# Patient Record
Sex: FEMALE | Race: WHITE | NOT HISPANIC OR LATINO
[De-identification: names, ages, dates, MRNs, and addresses within clinical notes are randomized per-mention and may not be internally consistent; named-entity substitution may affect disease eponyms.]

---

## 2017-02-02 ENCOUNTER — RX RENEWAL (OUTPATIENT)
Age: 82
End: 2017-02-02

## 2017-02-02 ENCOUNTER — APPOINTMENT (OUTPATIENT)
Dept: DERMATOLOGY | Facility: CLINIC | Age: 82
End: 2017-02-02

## 2017-02-13 ENCOUNTER — RX RENEWAL (OUTPATIENT)
Age: 82
End: 2017-02-13

## 2017-02-16 ENCOUNTER — RX RENEWAL (OUTPATIENT)
Age: 82
End: 2017-02-16

## 2017-03-06 DIAGNOSIS — Z86.39 PERSONAL HISTORY OF OTHER ENDOCRINE, NUTRITIONAL AND METABOLIC DISEASE: ICD-10-CM

## 2017-03-15 ENCOUNTER — RX RENEWAL (OUTPATIENT)
Age: 82
End: 2017-03-15

## 2017-03-28 ENCOUNTER — APPOINTMENT (OUTPATIENT)
Dept: INTERNAL MEDICINE | Facility: CLINIC | Age: 82
End: 2017-03-28

## 2017-04-06 ENCOUNTER — RX RENEWAL (OUTPATIENT)
Age: 82
End: 2017-04-06

## 2017-04-11 ENCOUNTER — NON-APPOINTMENT (OUTPATIENT)
Age: 82
End: 2017-04-11

## 2017-04-11 ENCOUNTER — APPOINTMENT (OUTPATIENT)
Dept: INTERNAL MEDICINE | Facility: CLINIC | Age: 82
End: 2017-04-11

## 2017-04-11 VITALS
HEIGHT: 65 IN | HEART RATE: 76 BPM | SYSTOLIC BLOOD PRESSURE: 130 MMHG | RESPIRATION RATE: 14 BRPM | DIASTOLIC BLOOD PRESSURE: 84 MMHG

## 2017-04-11 RX ORDER — DOCUSATE SODIUM 100 MG/1
100 CAPSULE ORAL
Refills: 0 | Status: ACTIVE | COMMUNITY

## 2017-04-11 RX ORDER — ASPIRIN 81 MG/1
81 TABLET, COATED ORAL
Refills: 0 | Status: ACTIVE | COMMUNITY

## 2017-04-11 RX ORDER — MULTIVITAMIN
CAPSULE ORAL
Refills: 0 | Status: ACTIVE | COMMUNITY

## 2017-04-11 RX ORDER — SENNOSIDES 8.6 MG TABLETS 8.6 MG/1
8.6 TABLET ORAL
Refills: 0 | Status: ACTIVE | COMMUNITY

## 2017-04-11 RX ORDER — LORATADINE 10 MG
17 TABLET,DISINTEGRATING ORAL
Refills: 0 | Status: ACTIVE | COMMUNITY

## 2017-04-11 RX ORDER — TRAVOPROST 0.04 MG/ML
0 SOLUTION/ DROPS OPHTHALMIC
Refills: 0 | Status: ACTIVE | COMMUNITY

## 2017-06-15 ENCOUNTER — RX RENEWAL (OUTPATIENT)
Age: 82
End: 2017-06-15

## 2017-06-16 ENCOUNTER — RX RENEWAL (OUTPATIENT)
Age: 82
End: 2017-06-16

## 2017-06-17 ENCOUNTER — EMERGENCY (EMERGENCY)
Facility: HOSPITAL | Age: 82
LOS: 1 days | Discharge: HOME | End: 2017-06-17
Admitting: EMERGENCY MEDICINE
Payer: COMMERCIAL

## 2017-06-17 DIAGNOSIS — G45.9 TRANSIENT CEREBRAL ISCHEMIC ATTACK, UNSPECIFIED: ICD-10-CM
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
## 2017-06-17 DIAGNOSIS — Z79.82 LONG TERM (CURRENT) USE OF ASPIRIN: ICD-10-CM

## 2017-06-17 DIAGNOSIS — R41.82 ALTERED MENTAL STATUS, UNSPECIFIED: ICD-10-CM

## 2017-06-17 DIAGNOSIS — S80.11XA CONTUSION OF RIGHT LOWER LEG, INITIAL ENCOUNTER: ICD-10-CM

## 2017-06-17 DIAGNOSIS — S00.83XA CONTUSION OF OTHER PART OF HEAD, INITIAL ENCOUNTER: ICD-10-CM

## 2017-06-17 DIAGNOSIS — I95.9 HYPOTENSION, UNSPECIFIED: ICD-10-CM
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
## 2017-06-17 DIAGNOSIS — Z79.899 OTHER LONG TERM (CURRENT) DRUG THERAPY: ICD-10-CM

## 2017-06-17 DIAGNOSIS — F32.9 MAJOR DEPRESSIVE DISORDER, SINGLE EPISODE, UNSPECIFIED: ICD-10-CM
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
## 2017-06-17 PROCEDURE — 85730 THROMBOPLASTIN TIME PARTIAL: CPT

## 2017-06-17 PROCEDURE — 73590 X-RAY EXAM OF LOWER LEG: CPT | Mod: 26,RT

## 2017-06-17 PROCEDURE — 99284 EMERGENCY DEPT VISIT MOD MDM: CPT | Mod: 25

## 2017-06-17 PROCEDURE — 70450 CT HEAD/BRAIN W/O DYE: CPT

## 2017-06-17 PROCEDURE — 73562 X-RAY EXAM OF KNEE 3: CPT

## 2017-06-17 PROCEDURE — 71010: CPT | Mod: 26

## 2017-06-17 PROCEDURE — 73560 X-RAY EXAM OF KNEE 1 OR 2: CPT | Mod: 26,RT

## 2017-06-17 PROCEDURE — 82962 GLUCOSE BLOOD TEST: CPT

## 2017-06-17 PROCEDURE — 71045 X-RAY EXAM CHEST 1 VIEW: CPT

## 2017-06-17 PROCEDURE — 73562 X-RAY EXAM OF KNEE 3: CPT | Mod: 26,RT

## 2017-06-17 PROCEDURE — 85610 PROTHROMBIN TIME: CPT

## 2017-06-17 PROCEDURE — 93010 ELECTROCARDIOGRAM REPORT: CPT

## 2017-06-17 PROCEDURE — 73590 X-RAY EXAM OF LOWER LEG: CPT

## 2017-06-17 PROCEDURE — 99285 EMERGENCY DEPT VISIT HI MDM: CPT

## 2017-06-17 PROCEDURE — 80053 COMPREHEN METABOLIC PANEL: CPT

## 2017-06-17 PROCEDURE — 70450 CT HEAD/BRAIN W/O DYE: CPT | Mod: 26

## 2017-06-17 PROCEDURE — 93005 ELECTROCARDIOGRAM TRACING: CPT

## 2017-06-17 PROCEDURE — 73560 X-RAY EXAM OF KNEE 1 OR 2: CPT

## 2017-06-17 PROCEDURE — 84484 ASSAY OF TROPONIN QUANT: CPT

## 2017-06-17 PROCEDURE — 82550 ASSAY OF CK (CPK): CPT

## 2017-06-17 PROCEDURE — 85027 COMPLETE CBC AUTOMATED: CPT

## 2017-08-07 ENCOUNTER — RX RENEWAL (OUTPATIENT)
Age: 82
End: 2017-08-07

## 2017-08-09 ENCOUNTER — RX RENEWAL (OUTPATIENT)
Age: 82
End: 2017-08-09

## 2017-09-29 ENCOUNTER — MEDICATION RENEWAL (OUTPATIENT)
Age: 82
End: 2017-09-29

## 2017-10-05 ENCOUNTER — RX RENEWAL (OUTPATIENT)
Age: 82
End: 2017-10-05

## 2017-10-06 ENCOUNTER — RX RENEWAL (OUTPATIENT)
Age: 82
End: 2017-10-06

## 2017-10-06 ENCOUNTER — MEDICATION RENEWAL (OUTPATIENT)
Age: 82
End: 2017-10-06

## 2017-12-01 ENCOUNTER — RX RENEWAL (OUTPATIENT)
Age: 82
End: 2017-12-01

## 2017-12-18 ENCOUNTER — MEDICATION RENEWAL (OUTPATIENT)
Age: 82
End: 2017-12-18

## 2017-12-25 ENCOUNTER — EMERGENCY (EMERGENCY)
Facility: HOSPITAL | Age: 82
LOS: 1 days | Discharge: HOME | End: 2017-12-25
Admitting: EMERGENCY MEDICINE
Payer: COMMERCIAL

## 2017-12-25 DIAGNOSIS — Z79.82 LONG TERM (CURRENT) USE OF ASPIRIN: ICD-10-CM

## 2017-12-25 DIAGNOSIS — R04.0 EPISTAXIS: ICD-10-CM

## 2017-12-25 DIAGNOSIS — Z79.52 LONG TERM (CURRENT) USE OF SYSTEMIC STEROIDS: ICD-10-CM

## 2017-12-25 DIAGNOSIS — Z79.899 OTHER LONG TERM (CURRENT) DRUG THERAPY: ICD-10-CM

## 2017-12-25 DIAGNOSIS — F32.9 MAJOR DEPRESSIVE DISORDER, SINGLE EPISODE, UNSPECIFIED: ICD-10-CM

## 2017-12-25 PROCEDURE — 99283 EMERGENCY DEPT VISIT LOW MDM: CPT | Mod: 25

## 2017-12-25 PROCEDURE — 99282 EMERGENCY DEPT VISIT SF MDM: CPT

## 2018-01-01 ENCOUNTER — RX RENEWAL (OUTPATIENT)
Age: 83
End: 2018-01-01

## 2018-02-08 ENCOUNTER — MEDICATION RENEWAL (OUTPATIENT)
Age: 83
End: 2018-02-08

## 2018-02-21 ENCOUNTER — APPOINTMENT (OUTPATIENT)
Dept: INTERNAL MEDICINE | Facility: CLINIC | Age: 83
End: 2018-02-21
Payer: MEDICARE

## 2018-02-21 VITALS
BODY MASS INDEX: 18.33 KG/M2 | WEIGHT: 110 LBS | SYSTOLIC BLOOD PRESSURE: 126 MMHG | HEART RATE: 68 BPM | HEIGHT: 65 IN | RESPIRATION RATE: 15 BRPM | DIASTOLIC BLOOD PRESSURE: 80 MMHG

## 2018-02-21 DIAGNOSIS — Z63.4 DISAPPEARANCE AND DEATH OF FAMILY MEMBER: ICD-10-CM

## 2018-02-21 DIAGNOSIS — Z00.00 ENCOUNTER FOR GENERAL ADULT MEDICAL EXAMINATION W/OUT ABNORMAL FINDINGS: ICD-10-CM

## 2018-02-21 PROCEDURE — 99215 OFFICE O/P EST HI 40 MIN: CPT

## 2018-02-21 SDOH — SOCIAL STABILITY - SOCIAL INSECURITY: DISSAPEARANCE AND DEATH OF FAMILY MEMBER: Z63.4

## 2018-04-02 ENCOUNTER — RX RENEWAL (OUTPATIENT)
Age: 83
End: 2018-04-02

## 2018-04-29 ENCOUNTER — RX RENEWAL (OUTPATIENT)
Age: 83
End: 2018-04-29

## 2018-06-20 ENCOUNTER — RX RENEWAL (OUTPATIENT)
Age: 83
End: 2018-06-20

## 2018-07-05 ENCOUNTER — RX RENEWAL (OUTPATIENT)
Age: 83
End: 2018-07-05

## 2018-08-21 ENCOUNTER — RX RENEWAL (OUTPATIENT)
Age: 83
End: 2018-08-21

## 2018-09-13 ENCOUNTER — RX RENEWAL (OUTPATIENT)
Age: 83
End: 2018-09-13

## 2018-09-20 ENCOUNTER — LABORATORY RESULT (OUTPATIENT)
Age: 83
End: 2018-09-20

## 2018-09-28 ENCOUNTER — RX RENEWAL (OUTPATIENT)
Age: 83
End: 2018-09-28

## 2018-10-07 ENCOUNTER — RX RENEWAL (OUTPATIENT)
Age: 83
End: 2018-10-07

## 2018-10-17 ENCOUNTER — RX RENEWAL (OUTPATIENT)
Age: 83
End: 2018-10-17

## 2018-11-23 ENCOUNTER — INPATIENT (INPATIENT)
Facility: HOSPITAL | Age: 83
LOS: 4 days | Discharge: HOME HEALTH | DRG: 194 | End: 2018-11-28
Attending: INTERNAL MEDICINE | Admitting: INTERNAL MEDICINE
Payer: COMMERCIAL

## 2018-11-23 VITALS
HEIGHT: 65 IN | RESPIRATION RATE: 18 BRPM | DIASTOLIC BLOOD PRESSURE: 61 MMHG | HEART RATE: 80 BPM | SYSTOLIC BLOOD PRESSURE: 142 MMHG | TEMPERATURE: 98 F | WEIGHT: 119.93 LBS

## 2018-11-23 DIAGNOSIS — I95.0 IDIOPATHIC HYPOTENSION: ICD-10-CM

## 2018-11-23 DIAGNOSIS — Z29.9 ENCOUNTER FOR PROPHYLACTIC MEASURES, UNSPECIFIED: ICD-10-CM

## 2018-11-23 DIAGNOSIS — J18.1 LOBAR PNEUMONIA, UNSPECIFIED ORGANISM: ICD-10-CM

## 2018-11-23 DIAGNOSIS — F33.1 MAJOR DEPRESSIVE DISORDER, RECURRENT, MODERATE: ICD-10-CM

## 2018-11-23 LAB
ALBUMIN SERPL ELPH-MCNC: 3.2 G/DL — LOW (ref 3.3–5)
ALP SERPL-CCNC: 63 U/L — SIGNIFICANT CHANGE UP (ref 40–120)
ALT FLD-CCNC: 24 U/L DA — SIGNIFICANT CHANGE UP (ref 10–45)
ANION GAP SERPL CALC-SCNC: 9 MMOL/L — SIGNIFICANT CHANGE UP (ref 5–17)
APTT BLD: 29.2 SEC — SIGNIFICANT CHANGE UP (ref 27.5–36.3)
AST SERPL-CCNC: 53 U/L — HIGH (ref 10–40)
BASOPHILS # BLD AUTO: 0 K/UL — SIGNIFICANT CHANGE UP (ref 0–0.2)
BASOPHILS NFR BLD AUTO: 0.4 % — SIGNIFICANT CHANGE UP (ref 0–2)
BILIRUB SERPL-MCNC: 0.7 MG/DL — SIGNIFICANT CHANGE UP (ref 0.2–1.2)
BUN SERPL-MCNC: 18 MG/DL — SIGNIFICANT CHANGE UP (ref 7–23)
CALCIUM SERPL-MCNC: 10.3 MG/DL — SIGNIFICANT CHANGE UP (ref 8.4–10.5)
CHLORIDE SERPL-SCNC: 102 MMOL/L — SIGNIFICANT CHANGE UP (ref 96–108)
CO2 SERPL-SCNC: 30 MMOL/L — SIGNIFICANT CHANGE UP (ref 22–31)
CREAT SERPL-MCNC: 0.97 MG/DL — SIGNIFICANT CHANGE UP (ref 0.5–1.3)
EOSINOPHIL # BLD AUTO: 0 K/UL — SIGNIFICANT CHANGE UP (ref 0–0.5)
EOSINOPHIL NFR BLD AUTO: 0.1 % — SIGNIFICANT CHANGE UP (ref 0–6)
FLUAV SPEC QL CULT: NEGATIVE — SIGNIFICANT CHANGE UP
FLUBV AG SPEC QL IA: NEGATIVE — SIGNIFICANT CHANGE UP
GLUCOSE SERPL-MCNC: 110 MG/DL — HIGH (ref 70–99)
HCT VFR BLD CALC: 36.8 % — SIGNIFICANT CHANGE UP (ref 34.5–45)
HGB BLD-MCNC: 12 G/DL — SIGNIFICANT CHANGE UP (ref 11.5–15.5)
INR BLD: 1.2 RATIO — HIGH (ref 0.88–1.16)
LACTATE SERPL-SCNC: 1.3 MMOL/L — SIGNIFICANT CHANGE UP (ref 0.7–2)
LYMPHOCYTES # BLD AUTO: 0.7 K/UL — LOW (ref 1–3.3)
LYMPHOCYTES # BLD AUTO: 5.9 % — LOW (ref 13–44)
MCHC RBC-ENTMCNC: 31.8 PG — SIGNIFICANT CHANGE UP (ref 27–34)
MCHC RBC-ENTMCNC: 32.7 GM/DL — SIGNIFICANT CHANGE UP (ref 32–36)
MCV RBC AUTO: 97.3 FL — SIGNIFICANT CHANGE UP (ref 80–100)
MONOCYTES # BLD AUTO: 1.6 K/UL — HIGH (ref 0–0.9)
MONOCYTES NFR BLD AUTO: 14.8 % — HIGH (ref 2–14)
NEUTROPHILS # BLD AUTO: 8.7 K/UL — HIGH (ref 1.8–7.4)
NEUTROPHILS NFR BLD AUTO: 78.8 % — HIGH (ref 43–77)
NT-PROBNP SERPL-SCNC: 2057 PG/ML — HIGH (ref 0–300)
PLATELET # BLD AUTO: 195 K/UL — SIGNIFICANT CHANGE UP (ref 150–400)
POTASSIUM SERPL-MCNC: 4.8 MMOL/L — SIGNIFICANT CHANGE UP (ref 3.5–5.3)
POTASSIUM SERPL-SCNC: 4.8 MMOL/L — SIGNIFICANT CHANGE UP (ref 3.5–5.3)
PROT SERPL-MCNC: 8.2 G/DL — SIGNIFICANT CHANGE UP (ref 6–8.3)
PROTHROM AB SERPL-ACNC: 13.5 SEC — HIGH (ref 10–12.9)
RBC # BLD: 3.78 M/UL — LOW (ref 3.8–5.2)
RBC # FLD: 13.6 % — SIGNIFICANT CHANGE UP (ref 10.3–14.5)
SODIUM SERPL-SCNC: 141 MMOL/L — SIGNIFICANT CHANGE UP (ref 135–145)
WBC # BLD: 11.1 K/UL — HIGH (ref 3.8–10.5)
WBC # FLD AUTO: 11.1 K/UL — HIGH (ref 3.8–10.5)

## 2018-11-23 PROCEDURE — 99223 1ST HOSP IP/OBS HIGH 75: CPT

## 2018-11-23 PROCEDURE — 93010 ELECTROCARDIOGRAM REPORT: CPT

## 2018-11-23 PROCEDURE — 71045 X-RAY EXAM CHEST 1 VIEW: CPT | Mod: 26

## 2018-11-23 PROCEDURE — 99285 EMERGENCY DEPT VISIT HI MDM: CPT

## 2018-11-23 PROCEDURE — 71250 CT THORAX DX C-: CPT | Mod: 26

## 2018-11-23 RX ORDER — AZITHROMYCIN 500 MG/1
500 TABLET, FILM COATED ORAL DAILY
Qty: 0 | Refills: 0 | Status: DISCONTINUED | OUTPATIENT
Start: 2018-11-23 | End: 2018-11-24

## 2018-11-23 RX ORDER — SENNA PLUS 8.6 MG/1
2 TABLET ORAL AT BEDTIME
Qty: 0 | Refills: 0 | Status: DISCONTINUED | OUTPATIENT
Start: 2018-11-23 | End: 2018-11-28

## 2018-11-23 RX ORDER — ACETAMINOPHEN 500 MG
650 TABLET ORAL ONCE
Qty: 0 | Refills: 0 | Status: COMPLETED | OUTPATIENT
Start: 2018-11-23 | End: 2018-11-23

## 2018-11-23 RX ORDER — POLYETHYLENE GLYCOL 3350 17 G/17G
17 POWDER, FOR SOLUTION ORAL DAILY
Qty: 0 | Refills: 0 | Status: DISCONTINUED | OUTPATIENT
Start: 2018-11-23 | End: 2018-11-28

## 2018-11-23 RX ORDER — SODIUM CHLORIDE 9 MG/ML
1000 INJECTION INTRAMUSCULAR; INTRAVENOUS; SUBCUTANEOUS
Qty: 0 | Refills: 0 | Status: DISCONTINUED | OUTPATIENT
Start: 2018-11-23 | End: 2018-11-24

## 2018-11-23 RX ORDER — CEFTRIAXONE 500 MG/1
1 INJECTION, POWDER, FOR SOLUTION INTRAMUSCULAR; INTRAVENOUS ONCE
Qty: 0 | Refills: 0 | Status: COMPLETED | OUTPATIENT
Start: 2018-11-23 | End: 2018-11-23

## 2018-11-23 RX ORDER — MIDODRINE HYDROCHLORIDE 2.5 MG/1
10 TABLET ORAL THREE TIMES A DAY
Qty: 0 | Refills: 0 | Status: DISCONTINUED | OUTPATIENT
Start: 2018-11-23 | End: 2018-11-28

## 2018-11-23 RX ORDER — DOCUSATE SODIUM 100 MG
100 CAPSULE ORAL
Qty: 0 | Refills: 0 | Status: DISCONTINUED | OUTPATIENT
Start: 2018-11-23 | End: 2018-11-28

## 2018-11-23 RX ORDER — CEFTRIAXONE 500 MG/1
1 INJECTION, POWDER, FOR SOLUTION INTRAMUSCULAR; INTRAVENOUS EVERY 24 HOURS
Qty: 0 | Refills: 0 | Status: DISCONTINUED | OUTPATIENT
Start: 2018-11-23 | End: 2018-11-28

## 2018-11-23 RX ORDER — ASPIRIN/CALCIUM CARB/MAGNESIUM 324 MG
81 TABLET ORAL DAILY
Qty: 0 | Refills: 0 | Status: DISCONTINUED | OUTPATIENT
Start: 2018-11-23 | End: 2018-11-28

## 2018-11-23 RX ORDER — LATANOPROST 0.05 MG/ML
1 SOLUTION/ DROPS OPHTHALMIC; TOPICAL AT BEDTIME
Qty: 0 | Refills: 0 | Status: DISCONTINUED | OUTPATIENT
Start: 2018-11-23 | End: 2018-11-28

## 2018-11-23 RX ORDER — HEPARIN SODIUM 5000 [USP'U]/ML
5000 INJECTION INTRAVENOUS; SUBCUTANEOUS EVERY 8 HOURS
Qty: 0 | Refills: 0 | Status: DISCONTINUED | OUTPATIENT
Start: 2018-11-23 | End: 2018-11-28

## 2018-11-23 RX ORDER — AZITHROMYCIN 500 MG/1
500 TABLET, FILM COATED ORAL ONCE
Qty: 0 | Refills: 0 | Status: COMPLETED | OUTPATIENT
Start: 2018-11-23 | End: 2018-11-23

## 2018-11-23 RX ORDER — MIRTAZAPINE 45 MG/1
15 TABLET, ORALLY DISINTEGRATING ORAL DAILY
Qty: 0 | Refills: 0 | Status: DISCONTINUED | OUTPATIENT
Start: 2018-11-23 | End: 2018-11-28

## 2018-11-23 RX ORDER — SODIUM CHLORIDE 9 MG/ML
1700 INJECTION INTRAMUSCULAR; INTRAVENOUS; SUBCUTANEOUS ONCE
Qty: 0 | Refills: 0 | Status: COMPLETED | OUTPATIENT
Start: 2018-11-23 | End: 2018-11-23

## 2018-11-23 RX ADMIN — AZITHROMYCIN 255 MILLIGRAM(S): 500 TABLET, FILM COATED ORAL at 15:20

## 2018-11-23 RX ADMIN — CEFTRIAXONE 1 GRAM(S): 500 INJECTION, POWDER, FOR SOLUTION INTRAMUSCULAR; INTRAVENOUS at 15:20

## 2018-11-23 RX ADMIN — CEFTRIAXONE 100 GRAM(S): 500 INJECTION, POWDER, FOR SOLUTION INTRAMUSCULAR; INTRAVENOUS at 15:05

## 2018-11-23 RX ADMIN — SODIUM CHLORIDE 1700 MILLILITER(S): 9 INJECTION INTRAMUSCULAR; INTRAVENOUS; SUBCUTANEOUS at 17:00

## 2018-11-23 RX ADMIN — SODIUM CHLORIDE 1700 MILLILITER(S): 9 INJECTION INTRAMUSCULAR; INTRAVENOUS; SUBCUTANEOUS at 15:55

## 2018-11-23 RX ADMIN — Medication 650 MILLIGRAM(S): at 15:55

## 2018-11-23 RX ADMIN — Medication 650 MILLIGRAM(S): at 16:37

## 2018-11-23 NOTE — H&P ADULT - NSHPREVIEWOFSYSTEMS_GEN_ALL_CORE
REVIEW OF SYSTEMS:  CONSTITUTIONAL: No fever, weight loss, +fatigue  RESPIRATORY: + cough, no wheezing, chills or hemoptysis; No shortness of breath  CARDIOVASCULAR: No chest pain, palpitations, dizziness, or leg swelling  GASTROINTESTINAL: No abdominal pain, No nausea, vomiting, or hematemesis; No diarrhea or constipation  GENITOURINARY: No dysuria, frequency, hematuria, or incontinence  NEUROLOGICAL: No headaches, memory loss, loss of strength, numbness, or tremors  SKIN: No itching, burning, rashes, or lesions   MUSCULOSKELETAL: No joint pain or swelling; No muscle, back, or extremity pain      All other ROS reviewed and negative except as otherwise stated

## 2018-11-23 NOTE — H&P ADULT - NSHPPHYSICALEXAM_GEN_ALL_CORE
PHYSICAL EXAM:  GENERAL: elderly, lethargic  CHEST/LUNG: right sided rhonchi   HEART: Regular rate and rhythm; S1/S2, No murmurs, rubs, or gallops  ABDOMEN: Soft, Nontender, Nondistended; Bowel sounds present  VASCULAR: Normal pulses, Normal capillary refill  EXTREMITIES:  2+ Peripheral Pulses, No clubbing, cyanosis, or edema  SKIN: Warm, Intact  PSYCH: Normal mood and affect  NERVOUS SYSTEM:  A/O x3, Good concentration; CN 2-12 intact, No focal deficits

## 2018-11-23 NOTE — H&P ADULT - HISTORY OF PRESENT ILLNESS
88 y/o F with PMH depression, hypotension presents with a cough for 2 days, today more productive. Patients daughter didnt like how she looked, lethargic , and brought her to ED.Pt admit to having chills and no appetite. Denies chest pain, sob, n/v/d, abd pain, sick contacts

## 2018-11-23 NOTE — ED PROVIDER NOTE - PROGRESS NOTE DETAILS
fluids held untile cxr and labs resulted. b/l rales. concern for CHF. will reassess and give fluids if needed

## 2018-11-23 NOTE — ED ADULT NURSE NOTE - OBJECTIVE STATEMENT
88 YO female, brought in by family who state the patient has had a cough for " a few days". + Rhonchi noted with cough.

## 2018-11-23 NOTE — ED PROVIDER NOTE - ATTENDING CONTRIBUTION TO CARE
· HPI Objective Statement: pt 89 f c/o cough x 1 day, lethargy and decreased appetite. pt feels weak. SOB while walking  	+sick contacts a home with cold like symptoms  denies fever, chills, chest pain, n/v  PE General:     NAD, ill -appearing  Head:     NC/AT, EOMI, oral mucosa dry   Neck:     trachea midline  Lungs:     RLL rales   CVS:     S1S2, RRR, no m/g/r  Abd:     +BS, s/nt/nd, no organomegaly  Ext:    2+ radial and pedal pulses, no c/c/e  Neuro: AAOx3, no sensory/motor deficits  Dr. Bhatti:  I have reviewed and discussed with the PA/ resident the case specifics, including the history, physical assessment, evaluation, conclusion, laboratory results, and medical plan. I agree with the contents, and conclusions. I have personally examined, and interviewed the patient.

## 2018-11-23 NOTE — ED ADULT NURSE NOTE - NSIMPLEMENTINTERV_GEN_ALL_ED
Implemented All Fall with Harm Risk Interventions:  [place] to call system. Call bell, personal items and telephone within reach. Instruct patient to call for assistance. Room bathroom lighting operational. Non-slip footwear when patient is off stretcher. Physically safe environment: no spills, clutter or unnecessary equipment. Stretcher in lowest position, wheels locked, appropriate side rails in place. Provide visual cue, wrist band, yellow gown, etc. Monitor gait and stability. Monitor for mental status changes and reorient to person, place, and time. Review medications for side effects contributing to fall risk. Reinforce activity limits and safety measures with patient and family. Provide visual clues: red socks.

## 2018-11-23 NOTE — H&P ADULT - NSHPLABSRESULTS_GEN_ALL_CORE
12.0   11.1  )-----------( 195      ( 23 Nov 2018 15:00 )             36.8       11-23    141  |  102  |  18  ----------------------------<  110<H>  4.8   |  30  |  0.97    Ca    10.3      23 Nov 2018 15:00    TPro  8.2  /  Alb  3.2<L>  /  TBili  0.7  /  DBili  x   /  AST  53<H>  /  ALT  24  /  AlkPhos  63  11-23      PT/INR - ( 23 Nov 2018 15:00 )   PT: 13.5 sec;   INR: 1.20 ratio         PTT - ( 23 Nov 2018 15:00 )  PTT:29.2 sec        < from: CT Chest No Cont (11.23.18 @ 16:49) >    IMPRESSION:     1.  Consolidation within the basilar right lower lobe representing   pneumonia. Recommend follow-up in 4 weeks to determine resolution.    2.  The thyroid gland is enlarged with multiple nodules. Recommend   further evaluation with dedicated ultrasound exam.      < end of copied text >

## 2018-11-23 NOTE — ED PROVIDER NOTE - PHYSICAL EXAMINATION
General:     NAD, ill appearing  Eyes: PERRL  Head:     NC/AT, EOMI, oral mucosa moist  Neck:     trachea midline  Lungs:     RLL rhonchi, b/l rales  CVS:     RRR  Abd:     +BS, s/nt/nd  Ext:   no deformities   Neuro: AAOx3, no sensory/motor deficits

## 2018-11-23 NOTE — ED PROVIDER NOTE - OBJECTIVE STATEMENT
pt 89 f c/o cough x 1 day, lethargy and decreased appetite. pt 89 f c/o cough x 1 day, lethargy and decreased appetite. pt feels weak. SOB while walking  +sick contacts a home with cold like symptoms  denies fever, chills, chest pain, n/v

## 2018-11-23 NOTE — H&P ADULT - PROBLEM SELECTOR PLAN 4
IMPROVE VTE Individual Risk Assessment          RISK                                                          Points  [  ] Previous VTE                                                3  [  ] Thrombophilia                                             2  [  ] Lower limb paralysis                                   2        (unable to hold up >15 seconds)    [  ] Current Cancer                                             2         (within 6 months)  [  ] Immobilization > 24 hrs                              1  [  ] ICU/CCU stay > 24 hours                             1  [ x ] Age > 60                                                         1    IMPROVE VTE Score:   1

## 2018-11-24 DIAGNOSIS — F32.9 MAJOR DEPRESSIVE DISORDER, SINGLE EPISODE, UNSPECIFIED: ICD-10-CM

## 2018-11-24 DIAGNOSIS — J18.9 PNEUMONIA, UNSPECIFIED ORGANISM: ICD-10-CM

## 2018-11-24 LAB
ANION GAP SERPL CALC-SCNC: 8 MMOL/L — SIGNIFICANT CHANGE UP (ref 5–17)
APPEARANCE UR: CLEAR — SIGNIFICANT CHANGE UP
BACTERIA # UR AUTO: ABNORMAL /HPF
BILIRUB UR-MCNC: NEGATIVE — SIGNIFICANT CHANGE UP
BUN SERPL-MCNC: 17 MG/DL — SIGNIFICANT CHANGE UP (ref 7–23)
CALCIUM SERPL-MCNC: 9.3 MG/DL — SIGNIFICANT CHANGE UP (ref 8.4–10.5)
CHLORIDE SERPL-SCNC: 110 MMOL/L — HIGH (ref 96–108)
CO2 SERPL-SCNC: 28 MMOL/L — SIGNIFICANT CHANGE UP (ref 22–31)
COLOR SPEC: YELLOW — SIGNIFICANT CHANGE UP
CREAT SERPL-MCNC: 0.87 MG/DL — SIGNIFICANT CHANGE UP (ref 0.5–1.3)
DIFF PNL FLD: ABNORMAL
EPI CELLS # UR: SIGNIFICANT CHANGE UP
GLUCOSE SERPL-MCNC: 83 MG/DL — SIGNIFICANT CHANGE UP (ref 70–99)
GLUCOSE UR QL: NEGATIVE — SIGNIFICANT CHANGE UP
HCT VFR BLD CALC: 31.8 % — LOW (ref 34.5–45)
HGB BLD-MCNC: 10.2 G/DL — LOW (ref 11.5–15.5)
KETONES UR-MCNC: NEGATIVE — SIGNIFICANT CHANGE UP
LEUKOCYTE ESTERASE UR-ACNC: ABNORMAL
MCHC RBC-ENTMCNC: 31.8 PG — SIGNIFICANT CHANGE UP (ref 27–34)
MCHC RBC-ENTMCNC: 32.1 GM/DL — SIGNIFICANT CHANGE UP (ref 32–36)
MCV RBC AUTO: 98.9 FL — SIGNIFICANT CHANGE UP (ref 80–100)
NITRITE UR-MCNC: NEGATIVE — SIGNIFICANT CHANGE UP
PH UR: 6 — SIGNIFICANT CHANGE UP (ref 5–8)
PLATELET # BLD AUTO: 145 K/UL — LOW (ref 150–400)
POTASSIUM SERPL-MCNC: 3.1 MMOL/L — LOW (ref 3.5–5.3)
POTASSIUM SERPL-SCNC: 3.1 MMOL/L — LOW (ref 3.5–5.3)
PROT UR-MCNC: 15
RBC # BLD: 3.21 M/UL — LOW (ref 3.8–5.2)
RBC # FLD: 13.5 % — SIGNIFICANT CHANGE UP (ref 10.3–14.5)
RBC CASTS # UR COMP ASSIST: ABNORMAL /HPF (ref 0–4)
SODIUM SERPL-SCNC: 146 MMOL/L — HIGH (ref 135–145)
SP GR SPEC: 1.01 — SIGNIFICANT CHANGE UP (ref 1.01–1.02)
UROBILINOGEN FLD QL: NEGATIVE — SIGNIFICANT CHANGE UP
WBC # BLD: 15.6 K/UL — HIGH (ref 3.8–10.5)
WBC # FLD AUTO: 15.6 K/UL — HIGH (ref 3.8–10.5)
WBC UR QL: SIGNIFICANT CHANGE UP /HPF (ref 0–5)

## 2018-11-24 PROCEDURE — 99235 HOSP IP/OBS SAME DATE MOD 70: CPT

## 2018-11-24 PROCEDURE — 99223 1ST HOSP IP/OBS HIGH 75: CPT

## 2018-11-24 RX ORDER — DEXTROSE MONOHYDRATE, SODIUM CHLORIDE, AND POTASSIUM CHLORIDE 50; .745; 4.5 G/1000ML; G/1000ML; G/1000ML
1000 INJECTION, SOLUTION INTRAVENOUS
Qty: 0 | Refills: 0 | Status: DISCONTINUED | OUTPATIENT
Start: 2018-11-24 | End: 2018-11-28

## 2018-11-24 RX ORDER — POTASSIUM CHLORIDE 20 MEQ
10 PACKET (EA) ORAL ONCE
Qty: 0 | Refills: 0 | Status: COMPLETED | OUTPATIENT
Start: 2018-11-24 | End: 2018-11-24

## 2018-11-24 RX ORDER — ALBUTEROL 90 UG/1
2.5 AEROSOL, METERED ORAL EVERY 8 HOURS
Qty: 0 | Refills: 0 | Status: DISCONTINUED | OUTPATIENT
Start: 2018-11-24 | End: 2018-11-26

## 2018-11-24 RX ADMIN — MIRTAZAPINE 15 MILLIGRAM(S): 45 TABLET, ORALLY DISINTEGRATING ORAL at 22:14

## 2018-11-24 RX ADMIN — Medication 81 MILLIGRAM(S): at 12:44

## 2018-11-24 RX ADMIN — Medication 100 MILLIGRAM(S): at 17:52

## 2018-11-24 RX ADMIN — AZITHROMYCIN 500 MILLIGRAM(S): 500 TABLET, FILM COATED ORAL at 12:45

## 2018-11-24 RX ADMIN — LATANOPROST 1 DROP(S): 0.05 SOLUTION/ DROPS OPHTHALMIC; TOPICAL at 00:19

## 2018-11-24 RX ADMIN — SENNA PLUS 2 TABLET(S): 8.6 TABLET ORAL at 22:14

## 2018-11-24 RX ADMIN — SENNA PLUS 2 TABLET(S): 8.6 TABLET ORAL at 00:17

## 2018-11-24 RX ADMIN — CEFTRIAXONE 100 GRAM(S): 500 INJECTION, POWDER, FOR SOLUTION INTRAMUSCULAR; INTRAVENOUS at 06:40

## 2018-11-24 RX ADMIN — POLYETHYLENE GLYCOL 3350 17 GRAM(S): 17 POWDER, FOR SOLUTION ORAL at 12:44

## 2018-11-24 RX ADMIN — Medication 81 MILLIGRAM(S): at 00:14

## 2018-11-24 RX ADMIN — POLYETHYLENE GLYCOL 3350 17 GRAM(S): 17 POWDER, FOR SOLUTION ORAL at 00:16

## 2018-11-24 RX ADMIN — DEXTROSE MONOHYDRATE, SODIUM CHLORIDE, AND POTASSIUM CHLORIDE 50 MILLILITER(S): 50; .745; 4.5 INJECTION, SOLUTION INTRAVENOUS at 12:45

## 2018-11-24 RX ADMIN — HEPARIN SODIUM 5000 UNIT(S): 5000 INJECTION INTRAVENOUS; SUBCUTANEOUS at 00:18

## 2018-11-24 RX ADMIN — HEPARIN SODIUM 5000 UNIT(S): 5000 INJECTION INTRAVENOUS; SUBCUTANEOUS at 06:40

## 2018-11-24 RX ADMIN — Medication 100 MILLIEQUIVALENT(S): at 12:45

## 2018-11-24 RX ADMIN — HEPARIN SODIUM 5000 UNIT(S): 5000 INJECTION INTRAVENOUS; SUBCUTANEOUS at 22:15

## 2018-11-24 RX ADMIN — Medication 100 MILLIGRAM(S): at 00:15

## 2018-11-24 RX ADMIN — LATANOPROST 1 DROP(S): 0.05 SOLUTION/ DROPS OPHTHALMIC; TOPICAL at 22:15

## 2018-11-24 RX ADMIN — MIRTAZAPINE 15 MILLIGRAM(S): 45 TABLET, ORALLY DISINTEGRATING ORAL at 00:15

## 2018-11-24 RX ADMIN — Medication 100 MILLIGRAM(S): at 06:40

## 2018-11-24 RX ADMIN — ALBUTEROL 2.5 MILLIGRAM(S): 90 AEROSOL, METERED ORAL at 23:02

## 2018-11-24 RX ADMIN — HEPARIN SODIUM 5000 UNIT(S): 5000 INJECTION INTRAVENOUS; SUBCUTANEOUS at 14:29

## 2018-11-24 RX ADMIN — MIDODRINE HYDROCHLORIDE 10 MILLIGRAM(S): 2.5 TABLET ORAL at 00:55

## 2018-11-24 NOTE — CONSULT NOTE ADULT - SUBJECTIVE AND OBJECTIVE BOX
HPI:HPI:  89 y/o F with PMH depression, hypotension presents with a cough for 2 days, today more productive. Patients daughter didnt like how she looked, lethargic , and brought her to ED.Pt admit to having chills and no appetite. Denies chest pain, sob, n/v/d, abd pain, sick contacts (23 Nov 2018 17:56)      PAST MEDICAL & SURGICAL HISTORY:  Bladder dysfunction  Glaucoma  Depression  Hypotension  No significant past surgical history      FAMILY HISTORY:  No pertinent family history in first degree relatives      SOCIAL HISTORY:  Smoking: denies      No Known Allergies    Intolerances        HOME  MEDICATIONS:Home Medications:  aspirin 81 mg oral tablet, chewable: 1 tab(s) orally once a day (23 Nov 2018 18:16)  Colace 100 mg oral capsule: 1 cap(s) orally 2 times a day (23 Nov 2018 18:16)  fludrocortisone 0.1 mg oral tablet: orally 2 times a day (23 Nov 2018 18:16)  midodrine 10 mg oral tablet: 1 tab(s) orally 3 times a day (23 Nov 2018 18:16)  MiraLax oral powder for reconstitution: orally once a day (23 Nov 2018 18:16)  mirtazapine 15 mg oral tablet: 1 tab(s) orally once a day (at bedtime) (23 Nov 2018 18:16)  Multiple Vitamins oral capsule: 1 cap(s) orally once a day (23 Nov 2018 18:16)  Senna 8.6 mg oral tablet: 1 tab(s) orally once a day (at bedtime) (23 Nov 2018 18:16)  tolterodine 1 mg oral tablet: orally once a day (23 Nov 2018 18:16)  Travatan Z 0.004% ophthalmic solution: 1 drop(s) to each affected eye once a day (in the evening) (23 Nov 2018 18:16)      REVIEW OF SYSTEMS:  Constitutional: No fevers or chills. No weight loss. fatigue   Eyes: No itching or discharge from the eyes  ENT: No ear pain. No ear discharge. No nasal congestion. No post nasal drip. No epistaxis. No throat pain. No sore throat. No difficulty swallowing.   CV: No chest pain. No palpitations. No lightheadedness or dizziness.   Resp: No dyspnea at rest. No dyspnea on exertion.cough  GI: No nausea. No vomiting. No diarrhea.  MSK: No joint pain or pain in any extremities  Integumentary: No skin lesions. No pedal edema.  Neurological: No gross motor weakness. No sensory changes.    [ ] All other systems negative  [ ] Unable to assess ROS because ________    OBJECTIVE:  ICU Vital Signs Last 24 Hrs  T(C): 36.8 (24 Nov 2018 06:53), Max: 39.3 (23 Nov 2018 15:35)  T(F): 98.3 (24 Nov 2018 06:53), Max: 102.8 (23 Nov 2018 15:35)  HR: 59 (24 Nov 2018 06:53) (59 - 90)  BP: 110/60 (24 Nov 2018 06:53) (89/57 - 142/61)  BP(mean): --  ABP: --  ABP(mean): --  RR: 14 (24 Nov 2018 06:53) (14 - 18)  SpO2: 96% (24 Nov 2018 06:53) (92% - 97%)        11-23 @ 07:01  -  11-24 @ 07:00  --------------------------------------------------------  IN: 1240 mL / OUT: 0 mL / NET: 1240 mL    11-24 @ 07:01  -  11-24 @ 12:06  --------------------------------------------------------  IN: 300 mL / OUT: 0 mL / NET: 300 mL      CAPILLARY BLOOD GLUCOSE          PHYSICAL EXAM:  General: Awake, alert, oriented X 3.   HEENT: Atraumatic, normocephalic.                 No nasal congestion.                No tonsillar or pharyngeal exudates.  Lymph Nodes: No palpable lymphadenopathy  Neck: No JVD. No carotid bruit.   Respiratory: scattered coarse bs  Cardiovascular: S1 S2 normal. No murmurs, rubs or gallops.   Abdomen: Soft, non-tender, non-distended. No organomegaly.  BS +  Extremities: Warm to touch. Peripheral pulse palpable. No pedal edema.   Skin: No rashes or skin lesions  Neurological: Motor and sensory examination equal and normal in all four extremities.  Psychiatry: Appropriate mood and affect.    HOSPITAL MEDICATIONS:  MEDICATIONS  (STANDING):  ALBUTerol   0.5% 2.5 milliGRAM(s) Nebulizer every 8 hours  aspirin  chewable 81 milliGRAM(s) Oral daily  azithromycin   Tablet 500 milliGRAM(s) Oral daily  cefTRIAXone   IVPB 1 Gram(s) IV Intermittent every 24 hours  dextrose 5% + sodium chloride 0.45% with potassium chloride 20 mEq/L 1000 milliLiter(s) (50 mL/Hr) IV Continuous <Continuous>  docusate sodium 100 milliGRAM(s) Oral two times a day  heparin  Injectable 5000 Unit(s) SubCutaneous every 8 hours  latanoprost 0.005% Ophthalmic Solution 1 Drop(s) Both EYES at bedtime  mirtazapine 15 milliGRAM(s) Oral daily  polyethylene glycol 3350 17 Gram(s) Oral daily  potassium chloride  10 mEq/100 mL IVPB 10 milliEquivalent(s) IV Intermittent once  senna 2 Tablet(s) Oral at bedtime    MEDICATIONS  (PRN):  midodrine 10 milliGRAM(s) Oral three times a day PRN hypotension      LABS:                        10.2   15.6  )-----------( 145      ( 24 Nov 2018 06:10 )             31.8     11-24    146<H>  |  110<H>  |  17  ----------------------------<  83  3.1<L>   |  28  |  0.87    Ca    9.3      24 Nov 2018 06:10    TPro  8.2  /  Alb  3.2<L>  /  TBili  0.7  /  DBili  x   /  AST  53<H>  /  ALT  24  /  AlkPhos  63  11-23    PT/INR - ( 23 Nov 2018 15:00 )   PT: 13.5 sec;   INR: 1.20 ratio         PTT - ( 23 Nov 2018 15:00 )  PTT:29.2 sec          MICROBIOLOGY:     RADIOLOGY:  [ ] Reviewed and interpreted by me  < from: CT Chest No Cont (11.23.18 @ 16:49) >    EXAM:  CT CHEST      PROCEDURE DATE:  11/23/2018        INTERPRETATION:  CT CHEST WITHOUT CONTRAST    INDICATION: fever, cough, abnormal breath sounds    TECHNIQUE: Unenhanced helical images were obtained of the chest. Coronal   and sagittal imageswere reconstructed. Maximum intensity projection   images were generated.     COMPARISON: CT chest 11/22/2011.    FINDINGS:     AIRWAYS, LUNGS, PLEURA: The trachea is mildly deviated towards the left   side secondary to enlarged thyroid gland. Patentcentral airways. No   bronchial wall thickening or bronchiectasis.    Consolidation within the basilar right lower lobe. Mild bronchocentric   opacities within the basilar left lower lobe. Indistinct groundglass   opacities within the right middle lobe.    Minimal biapical scarring.    Trace right pleural effusion.    MEDIASTINUM, LYMPH NODES: Subcentimeter mediastinal lymph nodes likely   reactive.    HEART AND VASCULATURE: The heart is mildly enlarged. Trace pericardial   effusion. The thoracicaorta and pulmonary artery are normal in course   and caliber.    UPPER ABDOMEN: Unremarkable.    BONES AND SOFT TISSUES: No aggressive osseous lesion. Osteopenia. Spinal   degenerative changes.    LOWER NECK: The thyroid gland is severely enlarged with multiple nodules.    IMPRESSION:     1.  Consolidation within the basilar right lower lobe representing   pneumonia. Recommend follow-up in 4 weeks to determine resolution.    2.  The thyroid gland is enlarged with multiple nodules. Recommend   further evaluation with dedicated ultrasound exam.    < end of copied text >     EKG:

## 2018-11-24 NOTE — CONSULT NOTE ADULT - SUBJECTIVE AND OBJECTIVE BOX
HPI:   Patient is a 89y female with chronic hypotension, dementia, lives home with daughter, admitted yesterday for cough, fever, lethargy for the past couple of days per notes. Patient does not really know why she is here but does admit to a cough at times and states her daughter had to go to a wedding. She had difficulty swallowing pills with mild sore throat as her only other complaints.     REVIEW OF SYSTEMS:  All other review of systems negative (Comprehensive ROS)    PAST MEDICAL & SURGICAL HISTORY:  Bladder dysfunction  Glaucoma  Depression  Hypotension  No significant past surgical history      Allergies    No Known Allergies    Intolerances        Antimicrobials Day #  :2  cefTRIAXone   IVPB 1 Gram(s) IV Intermittent every 24 hours  doxycycline hyclate Capsule 100 milliGRAM(s) Oral every 12 hours    Other Medications:  ALBUTerol   0.5% 2.5 milliGRAM(s) Nebulizer every 8 hours  aspirin  chewable 81 milliGRAM(s) Oral daily  dextrose 5% + sodium chloride 0.45% with potassium chloride 20 mEq/L 1000 milliLiter(s) IV Continuous <Continuous>  docusate sodium 100 milliGRAM(s) Oral two times a day  heparin  Injectable 5000 Unit(s) SubCutaneous every 8 hours  latanoprost 0.005% Ophthalmic Solution 1 Drop(s) Both EYES at bedtime  midodrine 10 milliGRAM(s) Oral three times a day PRN  mirtazapine 15 milliGRAM(s) Oral daily  polyethylene glycol 3350 17 Gram(s) Oral daily  senna 2 Tablet(s) Oral at bedtime      FAMILY HISTORY:  No pertinent family history in first degree relatives      SOCIAL HISTORY:  Smoking: [ ]Yes [x ]No  ETOH: [ ]Yes [ x]No  Drug Use: [ ]Yes [x ]No   [x ] Single[ ]    T(F): 98.3 (11-24-18 @ 06:53), Max: 102.8 (11-23-18 @ 15:35)  HR: 59 (11-24-18 @ 06:53)  BP: 110/60 (11-24-18 @ 06:53)  RR: 14 (11-24-18 @ 06:53)  SpO2: 96% (11-24-18 @ 06:53)  Wt(kg): --    PHYSICAL EXAM:  General: alert, no acute distress  Eyes:  anicteric, no conjunctival injection, no discharge  Oropharynx: no lesions or injection 	  Neck: supple, without adenopathy  Lungs: occasional wheeze  to auscultation  Heart: regular rate and rhythm; no murmur, rubs or gallops  Abdomen: soft, nondistended, nontender, without mass or organomegaly  Skin: no lesions  Extremities: no clubbing, cyanosis, or edema  Neurologic: alert, oriented to place but cannot give detailed history moves all extremities    LAB RESULTS:                        10.2   15.6  )-----------( 145      ( 24 Nov 2018 06:10 )             31.8     11-24    146<H>  |  110<H>  |  17  ----------------------------<  83  3.1<L>   |  28  |  0.87    Ca    9.3      24 Nov 2018 06:10    TPro  8.2  /  Alb  3.2<L>  /  TBili  0.7  /  DBili  x   /  AST  53<H>  /  ALT  24  /  AlkPhos  63  11-23    LIVER FUNCTIONS - ( 23 Nov 2018 15:00 )  Alb: 3.2 g/dL / Pro: 8.2 g/dL / ALK PHOS: 63 U/L / ALT: 24 U/L DA / AST: 53 U/L / GGT: x               MICROBIOLOGY:  RECENT CULTURES:  Rapid Respiratory Viral Panel (11.23.18 @ 18:30)    Rapid RVP Result: Detected: This Respiratory Panel uses polymerase chain reaction (PCR) to detect for  adenovirus; coronavirus (HKU1, NL63, 229E, OC43); human metapneumovirus  (hMPV); human enterovirus/rhinovirus (Entero/RV); influenza A; influenza  A/H1; influenza A/H3; influenza A/H1-2009; influenza B; parainfluenza  viruses 1, 2, 3, 4; respiratory syncytial virus; Mycoplasma pneumoniae;  and Chlamydophila pneumoniae.          RADIOLOGY REVIEWED:    < from: CT Chest No Cont (11.23.18 @ 16:49) >    IMPRESSION:     1.  Consolidation within the basilar right lower lobe representing   pneumonia. Recommend follow-up in 4 weeks to determine resolution.    2.  The thyroid gland is enlarged with multiple nodules. Recommend   further evaluation with dedicated ultrasound exam.      < end of copied text >  Impression:  Elderly female from home with cough, fever, lethargy, found to have positive rsv and consolidation of right lung consistent with pneumonia. Given elevated wbc must suspect some component of bacterial pneumonia complicating viral infection with rsv.     Recommendations:  cover for bacterial pneumonia with ceftriaxone and doxycycline  follow up cultures  supportive care  check am procal

## 2018-11-25 DIAGNOSIS — F32.9 MAJOR DEPRESSIVE DISORDER, SINGLE EPISODE, UNSPECIFIED: ICD-10-CM

## 2018-11-25 LAB
ANION GAP SERPL CALC-SCNC: 7 MMOL/L — SIGNIFICANT CHANGE UP (ref 5–17)
BUN SERPL-MCNC: 13 MG/DL — SIGNIFICANT CHANGE UP (ref 7–23)
CALCIUM SERPL-MCNC: 9.7 MG/DL — SIGNIFICANT CHANGE UP (ref 8.4–10.5)
CHLORIDE SERPL-SCNC: 107 MMOL/L — SIGNIFICANT CHANGE UP (ref 96–108)
CO2 SERPL-SCNC: 29 MMOL/L — SIGNIFICANT CHANGE UP (ref 22–31)
CREAT SERPL-MCNC: 0.86 MG/DL — SIGNIFICANT CHANGE UP (ref 0.5–1.3)
GLUCOSE SERPL-MCNC: 102 MG/DL — HIGH (ref 70–99)
LEGIONELLA AG UR QL: NEGATIVE — SIGNIFICANT CHANGE UP
POTASSIUM SERPL-MCNC: 3.2 MMOL/L — LOW (ref 3.5–5.3)
POTASSIUM SERPL-SCNC: 3.2 MMOL/L — LOW (ref 3.5–5.3)
PROCALCITONIN SERPL-MCNC: 1.73 NG/ML — HIGH (ref 0.02–0.1)
SODIUM SERPL-SCNC: 143 MMOL/L — SIGNIFICANT CHANGE UP (ref 135–145)

## 2018-11-25 PROCEDURE — 99233 SBSQ HOSP IP/OBS HIGH 50: CPT

## 2018-11-25 RX ORDER — ACETAMINOPHEN 500 MG
650 TABLET ORAL EVERY 6 HOURS
Qty: 0 | Refills: 0 | Status: DISCONTINUED | OUTPATIENT
Start: 2018-11-25 | End: 2018-11-28

## 2018-11-25 RX ORDER — POTASSIUM CHLORIDE 20 MEQ
20 PACKET (EA) ORAL
Qty: 0 | Refills: 0 | Status: COMPLETED | OUTPATIENT
Start: 2018-11-25 | End: 2018-11-25

## 2018-11-25 RX ADMIN — HEPARIN SODIUM 5000 UNIT(S): 5000 INJECTION INTRAVENOUS; SUBCUTANEOUS at 14:00

## 2018-11-25 RX ADMIN — CEFTRIAXONE 100 GRAM(S): 500 INJECTION, POWDER, FOR SOLUTION INTRAMUSCULAR; INTRAVENOUS at 05:07

## 2018-11-25 RX ADMIN — HEPARIN SODIUM 5000 UNIT(S): 5000 INJECTION INTRAVENOUS; SUBCUTANEOUS at 23:08

## 2018-11-25 RX ADMIN — Medication 20 MILLIEQUIVALENT(S): at 13:59

## 2018-11-25 RX ADMIN — LATANOPROST 1 DROP(S): 0.05 SOLUTION/ DROPS OPHTHALMIC; TOPICAL at 23:09

## 2018-11-25 RX ADMIN — POLYETHYLENE GLYCOL 3350 17 GRAM(S): 17 POWDER, FOR SOLUTION ORAL at 12:15

## 2018-11-25 RX ADMIN — Medication 650 MILLIGRAM(S): at 14:00

## 2018-11-25 RX ADMIN — Medication 81 MILLIGRAM(S): at 12:15

## 2018-11-25 RX ADMIN — ALBUTEROL 2.5 MILLIGRAM(S): 90 AEROSOL, METERED ORAL at 06:14

## 2018-11-25 RX ADMIN — Medication 100 MILLIGRAM(S): at 17:22

## 2018-11-25 RX ADMIN — ALBUTEROL 2.5 MILLIGRAM(S): 90 AEROSOL, METERED ORAL at 16:02

## 2018-11-25 RX ADMIN — Medication 100 MILLIGRAM(S): at 05:06

## 2018-11-25 RX ADMIN — SENNA PLUS 2 TABLET(S): 8.6 TABLET ORAL at 23:09

## 2018-11-25 RX ADMIN — HEPARIN SODIUM 5000 UNIT(S): 5000 INJECTION INTRAVENOUS; SUBCUTANEOUS at 05:06

## 2018-11-25 RX ADMIN — ALBUTEROL 2.5 MILLIGRAM(S): 90 AEROSOL, METERED ORAL at 22:08

## 2018-11-25 RX ADMIN — Medication 20 MILLIEQUIVALENT(S): at 17:22

## 2018-11-25 RX ADMIN — MIRTAZAPINE 15 MILLIGRAM(S): 45 TABLET, ORALLY DISINTEGRATING ORAL at 12:15

## 2018-11-25 RX ADMIN — Medication 650 MILLIGRAM(S): at 14:34

## 2018-11-25 RX ADMIN — ALBUTEROL 2.5 MILLIGRAM(S): 90 AEROSOL, METERED ORAL at 06:20

## 2018-11-26 LAB
CULTURE RESULTS: NO GROWTH — SIGNIFICANT CHANGE UP
HCT VFR BLD CALC: 35.2 % — SIGNIFICANT CHANGE UP (ref 34.5–45)
HGB BLD-MCNC: 11.6 G/DL — SIGNIFICANT CHANGE UP (ref 11.5–15.5)
MCHC RBC-ENTMCNC: 32.2 PG — SIGNIFICANT CHANGE UP (ref 27–34)
MCHC RBC-ENTMCNC: 32.9 GM/DL — SIGNIFICANT CHANGE UP (ref 32–36)
MCV RBC AUTO: 97.8 FL — SIGNIFICANT CHANGE UP (ref 80–100)
PLATELET # BLD AUTO: 176 K/UL — SIGNIFICANT CHANGE UP (ref 150–400)
RBC # BLD: 3.6 M/UL — LOW (ref 3.8–5.2)
RBC # FLD: 13.3 % — SIGNIFICANT CHANGE UP (ref 10.3–14.5)
SPECIMEN SOURCE: SIGNIFICANT CHANGE UP
WBC # BLD: 7.5 K/UL — SIGNIFICANT CHANGE UP (ref 3.8–10.5)
WBC # FLD AUTO: 7.5 K/UL — SIGNIFICANT CHANGE UP (ref 3.8–10.5)

## 2018-11-26 PROCEDURE — 99232 SBSQ HOSP IP/OBS MODERATE 35: CPT

## 2018-11-26 PROCEDURE — 99233 SBSQ HOSP IP/OBS HIGH 50: CPT

## 2018-11-26 RX ORDER — ALBUTEROL 90 UG/1
2.5 AEROSOL, METERED ORAL EVERY 8 HOURS
Qty: 0 | Refills: 0 | Status: DISCONTINUED | OUTPATIENT
Start: 2018-11-26 | End: 2018-11-28

## 2018-11-26 RX ADMIN — CEFTRIAXONE 100 GRAM(S): 500 INJECTION, POWDER, FOR SOLUTION INTRAMUSCULAR; INTRAVENOUS at 06:49

## 2018-11-26 RX ADMIN — LATANOPROST 1 DROP(S): 0.05 SOLUTION/ DROPS OPHTHALMIC; TOPICAL at 21:04

## 2018-11-26 RX ADMIN — Medication 81 MILLIGRAM(S): at 11:15

## 2018-11-26 RX ADMIN — Medication 100 MILLIGRAM(S): at 18:04

## 2018-11-26 RX ADMIN — DEXTROSE MONOHYDRATE, SODIUM CHLORIDE, AND POTASSIUM CHLORIDE 50 MILLILITER(S): 50; .745; 4.5 INJECTION, SOLUTION INTRAVENOUS at 11:15

## 2018-11-26 RX ADMIN — MIRTAZAPINE 15 MILLIGRAM(S): 45 TABLET, ORALLY DISINTEGRATING ORAL at 11:15

## 2018-11-26 RX ADMIN — SENNA PLUS 2 TABLET(S): 8.6 TABLET ORAL at 21:01

## 2018-11-26 RX ADMIN — POLYETHYLENE GLYCOL 3350 17 GRAM(S): 17 POWDER, FOR SOLUTION ORAL at 18:04

## 2018-11-26 RX ADMIN — HEPARIN SODIUM 5000 UNIT(S): 5000 INJECTION INTRAVENOUS; SUBCUTANEOUS at 06:49

## 2018-11-26 RX ADMIN — Medication 100 MILLIGRAM(S): at 06:49

## 2018-11-26 RX ADMIN — DEXTROSE MONOHYDRATE, SODIUM CHLORIDE, AND POTASSIUM CHLORIDE 50 MILLILITER(S): 50; .745; 4.5 INJECTION, SOLUTION INTRAVENOUS at 06:50

## 2018-11-26 RX ADMIN — DEXTROSE MONOHYDRATE, SODIUM CHLORIDE, AND POTASSIUM CHLORIDE 50 MILLILITER(S): 50; .745; 4.5 INJECTION, SOLUTION INTRAVENOUS at 18:06

## 2018-11-26 NOTE — SWALLOW BEDSIDE ASSESSMENT ADULT - ORAL PHASE
Within functional limits Delayed oral transit time/+ piecemeal deglutition, suspect 2/2 absent dentures. Pt states oral manipulation is a little difficult without dentures.

## 2018-11-26 NOTE — SWALLOW BEDSIDE ASSESSMENT ADULT - PHARYNGEAL PHASE
Multiple swallows/Delayed pharyngeal swallow/Decreased laryngeal elevation Decreased laryngeal elevation/Delayed pharyngeal swallow/Multiple swallows Delayed pharyngeal swallow/Decreased laryngeal elevation/Multiple swallows

## 2018-11-26 NOTE — SWALLOW BEDSIDE ASSESSMENT ADULT - SLP PERTINENT HISTORY OF CURRENT PROBLEM
89 y/o F with PMH depression, hypotension presents with a cough for 2 days, today more productive. Patients daughter didnt like how she looked, lethargic , and brought her to ED.Pt admit to having chills and no appetite. Denies chest pain, sob, n/v/d, abd pain, sick contacts

## 2018-11-26 NOTE — SWALLOW BEDSIDE ASSESSMENT ADULT - SWALLOW EVAL: DIAGNOSIS
Pt presents w/ oropharyngeal swallow sequence clinically judged to be GWFL. Pt has baseline cough which complicates clinical assessment. However, coughing did not appear to be in response to PO intake, as pt did not cough immediately post trials & exhibited clear vocal quality throughout trials.

## 2018-11-26 NOTE — SWALLOW BEDSIDE ASSESSMENT ADULT - COMMENTS
NKA. WBC 11/26/18: 7.5. CXR 11/23/18: No acute lung finding. 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 CT Chest 11/23/18: Consolidation within the basilar right lower lobe representing pneumonia.  Mild bronchocentric opacities within the basilar left lower lobe. Indistinct groundglass opacities within the right middle lobe. Minimal biapical scarring. Trace right pleural effusion. Recommend follow-up in 4 weeks to determine resolution. The thyroid gland is enlarged with multiple nodules. Recommend further evaluation with dedicated ultrasound exam.  RVP positive for RSV; also per ID 11/24/18, Given elevated wbc must suspect some component of bacterial pneumonia complicating viral infection with rsv.

## 2018-11-26 NOTE — SWALLOW BEDSIDE ASSESSMENT ADULT - ADDITIONAL RECOMMENDATIONS
Maintain adequate oral hygiene.  Pt recommended to use her dentures during meals.  Monitor for s/s aspiration/laryngeal penetration. If noted:  D/C p.o. intake, provide non-oral nutrition/hydration/meds, and contact this service @ [phone]

## 2018-11-26 NOTE — SWALLOW BEDSIDE ASSESSMENT ADULT - ASR SWALLOW ASPIRATION MONITOR
throat clearing/pneumonia/gurgly voice/upper respiratory infection/change of breathing pattern/cough/fever

## 2018-11-26 NOTE — SWALLOW BEDSIDE ASSESSMENT ADULT - SLP GENERAL OBSERVATIONS
Pt received awake & alert in bed, grossly Ox4. Pt able to follow up to 3-step verbal commands & engage in conversation. Pt able to discuss her current & recent health status (ie hospitalized due to cough, had recent fall resulting in left arm fx, notices she coughs when she eats in reclined position, had recent choking episode).

## 2018-11-26 NOTE — SWALLOW BEDSIDE ASSESSMENT ADULT - SLP PATIENT PROFILE REVIEW
Problem: [place] (,NICU)  Intervention: Promote Thermal Stability  Infant maintaining temperature in open crib, wearing onsie, pants and jacket, booties and hat, swaddled in blanket, room temperature adjusted.         yes

## 2018-11-27 PROCEDURE — 99232 SBSQ HOSP IP/OBS MODERATE 35: CPT

## 2018-11-27 PROCEDURE — 99233 SBSQ HOSP IP/OBS HIGH 50: CPT

## 2018-11-27 RX ADMIN — DEXTROSE MONOHYDRATE, SODIUM CHLORIDE, AND POTASSIUM CHLORIDE 50 MILLILITER(S): 50; .745; 4.5 INJECTION, SOLUTION INTRAVENOUS at 05:50

## 2018-11-27 RX ADMIN — MIRTAZAPINE 15 MILLIGRAM(S): 45 TABLET, ORALLY DISINTEGRATING ORAL at 12:12

## 2018-11-27 RX ADMIN — Medication 81 MILLIGRAM(S): at 12:12

## 2018-11-27 RX ADMIN — SENNA PLUS 2 TABLET(S): 8.6 TABLET ORAL at 22:43

## 2018-11-27 RX ADMIN — Medication 100 MILLIGRAM(S): at 17:37

## 2018-11-27 RX ADMIN — HEPARIN SODIUM 5000 UNIT(S): 5000 INJECTION INTRAVENOUS; SUBCUTANEOUS at 00:04

## 2018-11-27 RX ADMIN — HEPARIN SODIUM 5000 UNIT(S): 5000 INJECTION INTRAVENOUS; SUBCUTANEOUS at 13:44

## 2018-11-27 RX ADMIN — HEPARIN SODIUM 5000 UNIT(S): 5000 INJECTION INTRAVENOUS; SUBCUTANEOUS at 22:43

## 2018-11-27 RX ADMIN — Medication 100 MILLIGRAM(S): at 17:35

## 2018-11-27 RX ADMIN — LATANOPROST 1 DROP(S): 0.05 SOLUTION/ DROPS OPHTHALMIC; TOPICAL at 22:43

## 2018-11-27 RX ADMIN — POLYETHYLENE GLYCOL 3350 17 GRAM(S): 17 POWDER, FOR SOLUTION ORAL at 12:12

## 2018-11-27 RX ADMIN — CEFTRIAXONE 100 GRAM(S): 500 INJECTION, POWDER, FOR SOLUTION INTRAMUSCULAR; INTRAVENOUS at 05:51

## 2018-11-27 RX ADMIN — Medication 100 MILLIGRAM(S): at 05:50

## 2018-11-27 RX ADMIN — HEPARIN SODIUM 5000 UNIT(S): 5000 INJECTION INTRAVENOUS; SUBCUTANEOUS at 05:51

## 2018-11-28 ENCOUNTER — TRANSCRIPTION ENCOUNTER (OUTPATIENT)
Age: 83
End: 2018-11-28

## 2018-11-28 VITALS — WEIGHT: 115.3 LBS | HEIGHT: 65 IN

## 2018-11-28 DIAGNOSIS — B97.4 RESPIRATORY SYNCYTIAL VIRUS AS THE CAUSE OF DISEASES CLASSIFIED ELSEWHERE: ICD-10-CM

## 2018-11-28 LAB
CULTURE RESULTS: SIGNIFICANT CHANGE UP
CULTURE RESULTS: SIGNIFICANT CHANGE UP
SPECIMEN SOURCE: SIGNIFICANT CHANGE UP
SPECIMEN SOURCE: SIGNIFICANT CHANGE UP

## 2018-11-28 PROCEDURE — 96374 THER/PROPH/DIAG INJ IV PUSH: CPT

## 2018-11-28 PROCEDURE — 85027 COMPLETE CBC AUTOMATED: CPT

## 2018-11-28 PROCEDURE — 87486 CHLMYD PNEUM DNA AMP PROBE: CPT

## 2018-11-28 PROCEDURE — 83605 ASSAY OF LACTIC ACID: CPT

## 2018-11-28 PROCEDURE — 87400 INFLUENZA A/B EACH AG IA: CPT

## 2018-11-28 PROCEDURE — 80053 COMPREHEN METABOLIC PANEL: CPT

## 2018-11-28 PROCEDURE — 85730 THROMBOPLASTIN TIME PARTIAL: CPT

## 2018-11-28 PROCEDURE — 94640 AIRWAY INHALATION TREATMENT: CPT

## 2018-11-28 PROCEDURE — 99232 SBSQ HOSP IP/OBS MODERATE 35: CPT

## 2018-11-28 PROCEDURE — 87581 M.PNEUMON DNA AMP PROBE: CPT

## 2018-11-28 PROCEDURE — 94760 N-INVAS EAR/PLS OXIMETRY 1: CPT

## 2018-11-28 PROCEDURE — 97116 GAIT TRAINING THERAPY: CPT

## 2018-11-28 PROCEDURE — 83880 ASSAY OF NATRIURETIC PEPTIDE: CPT

## 2018-11-28 PROCEDURE — 87633 RESP VIRUS 12-25 TARGETS: CPT

## 2018-11-28 PROCEDURE — 80048 BASIC METABOLIC PNL TOTAL CA: CPT

## 2018-11-28 PROCEDURE — 87040 BLOOD CULTURE FOR BACTERIA: CPT

## 2018-11-28 PROCEDURE — 84145 PROCALCITONIN (PCT): CPT

## 2018-11-28 PROCEDURE — 85610 PROTHROMBIN TIME: CPT

## 2018-11-28 PROCEDURE — 97161 PT EVAL LOW COMPLEX 20 MIN: CPT

## 2018-11-28 PROCEDURE — 96375 TX/PRO/DX INJ NEW DRUG ADDON: CPT

## 2018-11-28 PROCEDURE — 99239 HOSP IP/OBS DSCHRG MGMT >30: CPT

## 2018-11-28 PROCEDURE — 87449 NOS EACH ORGANISM AG IA: CPT

## 2018-11-28 PROCEDURE — 87086 URINE CULTURE/COLONY COUNT: CPT

## 2018-11-28 PROCEDURE — 71250 CT THORAX DX C-: CPT

## 2018-11-28 PROCEDURE — 93005 ELECTROCARDIOGRAM TRACING: CPT

## 2018-11-28 PROCEDURE — 71045 X-RAY EXAM CHEST 1 VIEW: CPT

## 2018-11-28 PROCEDURE — 99285 EMERGENCY DEPT VISIT HI MDM: CPT | Mod: 25

## 2018-11-28 PROCEDURE — 81001 URINALYSIS AUTO W/SCOPE: CPT

## 2018-11-28 RX ORDER — CEFUROXIME AXETIL 250 MG
1 TABLET ORAL
Qty: 6 | Refills: 0
Start: 2018-11-28 | End: 2018-11-30

## 2018-11-28 RX ADMIN — Medication 100 MILLIGRAM(S): at 05:57

## 2018-11-28 RX ADMIN — DEXTROSE MONOHYDRATE, SODIUM CHLORIDE, AND POTASSIUM CHLORIDE 50 MILLILITER(S): 50; .745; 4.5 INJECTION, SOLUTION INTRAVENOUS at 05:57

## 2018-11-28 RX ADMIN — CEFTRIAXONE 100 GRAM(S): 500 INJECTION, POWDER, FOR SOLUTION INTRAMUSCULAR; INTRAVENOUS at 05:56

## 2018-11-28 RX ADMIN — Medication 81 MILLIGRAM(S): at 11:57

## 2018-11-28 RX ADMIN — POLYETHYLENE GLYCOL 3350 17 GRAM(S): 17 POWDER, FOR SOLUTION ORAL at 11:56

## 2018-11-28 RX ADMIN — HEPARIN SODIUM 5000 UNIT(S): 5000 INJECTION INTRAVENOUS; SUBCUTANEOUS at 05:57

## 2018-11-28 RX ADMIN — MIRTAZAPINE 15 MILLIGRAM(S): 45 TABLET, ORALLY DISINTEGRATING ORAL at 11:56

## 2018-11-28 NOTE — PROGRESS NOTE ADULT - PROBLEM SELECTOR PLAN 1
[facility] pulm to comment
iv antibiotics anticipate switch to po with next 24-48 hours
Consider converting to p.o. antibiotics tomorrow
Continue current antibiotics mobilize the patient
cont antibiotics  ? speech eval secondary to coughing, possible aspiration?
convert to oral antibiotics and discharge

## 2018-11-28 NOTE — DISCHARGE NOTE ADULT - HOSPITAL COURSE
89 y/l female with PMH of idiopathic hypotension, depression, glaucoma, p/w PNA and RSV, symptoms improved, now cleared for discharge by ID - Dr. Ellison on ceftin and doxycycline x 3 days. Pt. will follow up outpatient with Dr. Bishop.

## 2018-11-28 NOTE — DISCHARGE NOTE ADULT - NS AS ACTIVITY OBS
Walking-Indoors allowed/Walking-Outdoors allowed/Showering allowed/Bathing allowed/No Heavy lifting/straining

## 2018-11-28 NOTE — PROGRESS NOTE ADULT - PROBLEM SELECTOR PROBLEM 2
Depression
Pneumonia of right lower lobe due to infectious organism
Depression
Idiopathic hypotension
RSV infection

## 2018-11-28 NOTE — DISCHARGE NOTE ADULT - PATIENT PORTAL LINK FT
You can access the [facility] Patient Portal, offered by [facility], by registering with the following website: [email or web address]

## 2018-11-28 NOTE — PROGRESS NOTE ADULT - PROVIDER SPECIALTY LIST ADULT
Hospitalist
Infectious Disease
Internal Medicine
Pulmonology
Infectious Disease
Internal Medicine
Internal Medicine

## 2018-11-28 NOTE — PROGRESS NOTE ADULT - ASSESSMENT
admitted PNA..pulmonary and ID to comment
wbc count normalized....less cough ....continue iv antibiotics until switch to po by ID
88 y/o F with hypotension, depression, presents with a cough, lethargy found to have multifocal pneumonia.     improving    anticipate discharge in 1-2 days
Clinically improved.  Consider dc soon
Clinically improved. RSV pneumonitis with rll pneumonia.  Consider po meds and out pt f/u if pt continues to improve.
Discussed with infectious disease and pulmonary who agreed an outpatient discharges appropriate physical therapy note confirms the ability to go home with home physical therapy Dr. Martinez neurologist up with this virus RSV that she will be coughing for several weeks
Discussed with pulmonary and infectious disease consultants anticipate completing an intravenous dose of antibiotic tomorrow converting to oral medicines for discharge
Elderly, here with RSV and secondary bacterial pneumonia  R consolidation on CT  PCT elevated 1.73  Afebrile, feeling better, occas cough only, WBC normal  Cxs negative    Plan:  No objection to d/c in AM on Ceftin 250 mg po BID and Doxy 100 mg po BID x anther 3 days  D/w pt and son at bedside
MAXIMUM TEMPERATURE 99°F orally complaining of coughing continue present antibiotics as per infectious disease and pulmonary
MAXIMUM TEMPERATURE 99°F orally complaining of coughing continue present antibiotics as per infectious disease and pulmonary.  Clinically improved.  PT ordered.  Speech eval ordered
Pulm:  RSV pneumonitis with possible pneumonia.  Cough will be prolonged but is getting better.  Repeat ct 1 month as out patient

## 2018-11-28 NOTE — DISCHARGE NOTE ADULT - PLAN OF CARE
prevent sepsis Cleared for discharge by ID - Dr. Ellison on ceftin 250 mg po bid x 3 days and doxycycline 100 mg po bid x 3 days maintain BP wnl Stable on midodrine manage stable on current regimen Stable on home eye drops Supportive measures, po fluids, tylenol prn

## 2018-11-28 NOTE — DISCHARGE NOTE ADULT - CARE PROVIDER_API CALL
Helio Amor), Gastroenterology; Internal Medicine  [address] [facility]  [address]  [place], [place] [id number]  Phone: [phone]  Fax: [phone]

## 2018-11-28 NOTE — DISCHARGE NOTE ADULT - MEDICATION SUMMARY - MEDICATIONS TO TAKE
I will START or STAY ON the medications listed below when I get home from the hospital:    fludrocortisone 0.1 mg oral tablet  -- orally 2 times a day  -- Indication: For Inflammation     aspirin 81 mg oral tablet, chewable  -- 1 tab(s) by mouth once a day  -- Indication: For Prophylaxis     mirtazapine 15 mg oral tablet  -- 1 tab(s) by mouth once a day (at bedtime)  -- Indication: For Insomnia     doxycycline monohydrate 100 mg oral capsule  -- 1 cap(s) by mouth every 12 hours  -- Indication: For PNA (pneumonia)    cefuroxime 250 mg oral tablet  -- 1 tab(s) by mouth 12 times a day   -- Finish all this medication unless otherwise directed by prescriber.  Medication should be taken with plenty of water.  Take with food or milk.    -- Indication: For PNA (pneumonia)    MiraLax oral powder for reconstitution  -- orally once a day  -- Indication: For Constipation     Senna 8.6 mg oral tablet  -- 1 tab(s) by mouth once a day (at bedtime)  -- Indication: For Constipation     Colace 100 mg oral capsule  -- 1 cap(s) by mouth 2 times a day  -- Indication: For Constipation     midodrine 10 mg oral tablet  -- 1 tab(s) by mouth 3 times a day  -- Indication: For Hypotension    Travatan Z 0.004% ophthalmic solution  -- 1 drop(s) to each affected eye once a day (in the evening)  -- Indication: For Glaucoma     tolterodine 1 mg oral tablet  -- orally once a day  -- Indication: For Glaucoma     Multiple Vitamins oral capsule  -- 1 cap(s) by mouth once a day  -- Indication: For Supplement

## 2018-11-28 NOTE — PROGRESS NOTE ADULT - PROBLEM SELECTOR PROBLEM 1
PNA (pneumonia)
Pneumonia of right lower lobe due to infectious organism

## 2018-11-28 NOTE — PROGRESS NOTE ADULT - SUBJECTIVE AND OBJECTIVE BOX
CC: f/u for  pneumonia  Patient reports  feels no better today, still aches and coughs  REVIEW OF SYSTEMS:  All other review of systems negative (Comprehensive ROS)    Antimicrobials Day #  :3  cefTRIAXone   IVPB 1 Gram(s) IV Intermittent every 24 hours  doxycycline hyclate Capsule 100 milliGRAM(s) Oral every 12 hours    Other Medications Reviewed    T(F): 98.1 (18 @ 16:36), Max: 99 (18 @ 06:09)  HR: 77 (18 @ 16:36)  BP: 89/67 (18 @ 16:36)  RR: 16 (18 @ 16:36)  SpO2: 93% (18 @ 16:36)  Wt(kg): --    PHYSICAL EXAM:  General: alert, no acute distress  Eyes:  anicteric, no conjunctival injection, no discharge  Oropharynx: no lesions or injection 	  Neck: supple, without adenopathy  Lungs: course to auscultation  Heart: regular rate and rhythm; no murmur, rubs or gallops  Abdomen: soft, nondistended, nontender, without mass or organomegaly  Skin: no lesions  Extremities: no clubbing, cyanosis, or edema  Neurologic: alert,  moves all extremities    LAB RESULTS:                        10.2   15.6  )-----------( 145      ( 2018 06:10 )             31.8         143  |  107  |  13  ----------------------------<  102<H>  3.2<L>   |  29  |  0.86    Ca    9.7      2018 05:40        Urinalysis Basic - ( 2018 21:30 )    Color: Yellow / Appearance: Clear / S.015 / pH: x  Gluc: x / Ketone: Negative  / Bili: Negative / Urobili: Negative   Blood: x / Protein: 15 / Nitrite: Negative   Leuk Esterase: Trace / RBC: 5-10 /HPF / WBC 3-5 /HPF   Sq Epi: x / Non Sq Epi: Neg.-Few / Bacteria: Moderate /HPF      MICROBIOLOGY:  RECENT CULTURES:   @ 15:00 .Blood Blood-Peripheral     No growth to date.          RADIOLOGY REVIEWED:    < from: CT Chest No Cont (18 @ 16:49) >    IMPRESSION:     1.  Consolidation within the basilar right lower lobe representing   pneumonia. Recommend follow-up in 4 weeks to determine resolution.    2.  The thyroid gland is enlarged with multiple nodules. Recommend   further evaluation with dedicated ultrasound exam.    < end of copied text >    Assessment:  Elderly woman with bacterial pneumonia complicating rsv. She may not feel much better but she looks and sounds better today  Plan:  continue ceftriaxone and doxycycline  supportive care
Patient is a 89y old  Female who presents with a chief complaint of cough (23 Nov 2018 17:56)      INTERVAL HPI/OVERNIGHT EVENTS:lethargic      REVIEW OF SYSTEMS:  CONSTITUTIONAL: No fever, weight loss, or fatigue  EYES: No eye pain, visual disturbances, or discharge  ENMT:  No difficulty hearing, tinnitus, vertigo; No sinus or throat pain  NECK: No pain or stiffness  BREASTS: No pain, masses, or nipple discharge  RESPIRATORY: No cough, wheezing, chills or hemoptysis; No shortness of breath  CARDIOVASCULAR: No chest pain, palpitations, dizziness, or leg swelling  GASTROINTESTINAL: No abdominal or epigastric pain. No nausea, vomiting, or hematemesis; No diarrhea or constipation. No melena or hematochezia.  GENITOURINARY: No dysuria, frequency, hematuria, or incontinence  NEUROLOGICAL: No headaches, memory loss, loss of strength, numbness, or tremors  SKIN: No itching, burning, rashes, or lesions   LYMPH NODES: No enlarged glands  ENDOCRINE: No heat or cold intolerance; No hair loss  MUSCULOSKELETAL: No joint pain or swelling; No muscle, back, or extremity pain  PSYCHIATRIC: No depression, anxiety, mood swings, or difficulty sleeping  HEME/LYMPH: No easy bruising, or bleeding gums  ALLERY AND IMMUNOLOGIC: No hives or eczema  FAMILY HISTORY:  No pertinent family history in first degree relatives    T(C): 36.8 (11-24-18 @ 06:53), Max: 39.3 (11-23-18 @ 15:35)  HR: 59 (11-24-18 @ 06:53) (59 - 90)  BP: 110/60 (11-24-18 @ 06:53) (89/57 - 142/61)  RR: 14 (11-24-18 @ 06:53) (14 - 18)  SpO2: 96% (11-24-18 @ 06:53) (92% - 97%)  Wt(kg): --Vital Signs Last 24 Hrs  T(C): 36.8 (24 Nov 2018 06:53), Max: 39.3 (23 Nov 2018 15:35)  T(F): 98.3 (24 Nov 2018 06:53), Max: 102.8 (23 Nov 2018 15:35)  HR: 59 (24 Nov 2018 06:53) (59 - 90)  BP: 110/60 (24 Nov 2018 06:53) (89/57 - 142/61)  BP(mean): --  RR: 14 (24 Nov 2018 06:53) (14 - 18)  SpO2: 96% (24 Nov 2018 06:53) (92% - 97%)    T(F): 98.3 (11-24-18 @ 06:53), Max: 102.8 (11-23-18 @ 15:35)  HR: 59 (11-24-18 @ 06:53) (59 - 90)  BP: 110/60 (11-24-18 @ 06:53) (89/57 - 142/61)  RR: 14 (11-24-18 @ 06:53) (14 - 18)  SpO2: 96% (11-24-18 @ 06:53) (92% - 97%)    PHYSICAL EXAM:  GENERAL: NAD, well-groomed, well-developed  HEAD:  Atraumatic, Normocephalic  EYES: EOMI, PERRLA, conjunctiva and sclera clear  ENMT: No tonsillar erythema, exudates, or enlargement; Moist mucous membranes, Good dentition, No lesions  NECK: Supple, No JVD, Normal thyroid  NERVOUS SYSTEM:  Alert & Oriented X3, Good concentration; Motor Strength 5/5 B/L upper and lower extremities; DTRs 2+ intact and symmetric  CHEST/LUNG: Clear to percussion bilaterally; No rales, rhonchi, wheezing, or rubs  HEART: Regular rate and rhythm; No murmurs, rubs, or gallops  ABDOMEN: Soft, Nontender, Nondistended; Bowel sounds present  EXTREMITIES:  2+ Peripheral Pulses, No clubbing, cyanosis, or edema  LYMPH: No lymphadenopathy noted  SKIN: No rashes or lesions    Consultant(s) Notes Reviewed:  [x ] YES  [ ] NO  Care Discussed with Consultants/Other Providers [ x] YES  [ ] NO    LABS:  11-24    146<H>  |  110<H>  |  17  ----------------------------<  83  3.1<L>   |  28  |  0.87    Ca    9.3      24 Nov 2018 06:10    TPro  8.2  /  Alb  3.2<L>  /  TBili  0.7  /  DBili  x   /  AST  53<H>  /  ALT  24  /  AlkPhos  63  11-23                          10.2   15.6  )-----------( 145      ( 24 Nov 2018 06:10 )             31.8         PT/INR - ( 23 Nov 2018 15:00 )   PT: 13.5 sec;   INR: 1.20 ratio         PTT - ( 23 Nov 2018 15:00 )  PTT:29.2 sec  LIVER FUNCTIONS - ( 23 Nov 2018 15:00 )  Alb: 3.2 g/dL / Pro: 8.2 g/dL / ALK PHOS: 63 U/L / ALT: 24 U/L DA / AST: 53 U/L / GGT: x                            10.2   15.6  )-----------( 145      ( 11-24 @ 06:10 )             31.8                12.0   11.1  )-----------( 195      ( 11-23 @ 15:00 )             36.8               RADIOLOGY & ADDITIONAL TESTS:    Imaging Personally Reviewed:  [ ] YES  [ ] NO  aspirin  chewable 81 milliGRAM(s) Oral daily  azithromycin   Tablet 500 milliGRAM(s) Oral daily  cefTRIAXone   IVPB 1 Gram(s) IV Intermittent every 24 hours  docusate sodium 100 milliGRAM(s) Oral two times a day  heparin  Injectable 5000 Unit(s) SubCutaneous every 8 hours  latanoprost 0.005% Ophthalmic Solution 1 Drop(s) Both EYES at bedtime  midodrine 10 milliGRAM(s) Oral three times a day PRN  mirtazapine 15 milliGRAM(s) Oral daily  polyethylene glycol 3350 17 Gram(s) Oral daily  senna 2 Tablet(s) Oral at bedtime  sodium chloride 0.9%. 1000 milliLiter(s) IV Continuous <Continuous>      HEALTH ISSUES - PROBLEM Dx:  Depression, unspecified depression type: Depression, unspecified depression type  Prophylactic measure: Prophylactic measure  Moderate episode of recurrent major depressive disorder: Moderate episode of recurrent major depressive disorder  Idiopathic hypotension: Idiopathic hypotension  Pneumonia of right lower lobe due to infectious organism: Pneumonia of right lower lobe due to infectious organism
Patient is a 89y old  Female who presents with a chief complaint of cough/pna (26 Nov 2018 18:52)      INTERVAL HPI/OVERNIGHT EVENTS: feels better      REVIEW OF SYSTEMS:  CONSTITUTIONAL: No fever, weight loss, or fatigue  EYES: No eye pain, visual disturbances, or discharge  ENMT:  No difficulty hearing, tinnitus, vertigo; No sinus or throat pain  NECK: No pain or stiffness  BREASTS: No pain, masses, or nipple discharge  RESPIRATORY: No cough, wheezing, chills or hemoptysis; No shortness of breath  CARDIOVASCULAR: No chest pain, palpitations, dizziness, or leg swelling  GASTROINTESTINAL: No abdominal or epigastric pain. No nausea, vomiting, or hematemesis; No diarrhea or constipation. No melena or hematochezia.  GENITOURINARY: No dysuria, frequency, hematuria, or incontinence  NEUROLOGICAL: No headaches, memory loss, loss of strength, numbness, or tremors  SKIN: No itching, burning, rashes, or lesions   LYMPH NODES: No enlarged glands  ENDOCRINE: No heat or cold intolerance; No hair loss  MUSCULOSKELETAL: No joint pain or swelling; No muscle, back, or extremity pain  PSYCHIATRIC: No depression, anxiety, mood swings, or difficulty sleeping  HEME/LYMPH: No easy bruising, or bleeding gums  ALLERY AND IMMUNOLOGIC: No hives or eczema  FAMILY HISTORY:  No pertinent family history in first degree relatives    T(C): 37.1 (11-27-18 @ 06:24), Max: 37.7 (11-26-18 @ 15:07)  HR: 73 (11-27-18 @ 06:24) (73 - 89)  BP: 121/55 (11-27-18 @ 06:24) (100/60 - 132/96)  RR: 15 (11-27-18 @ 06:24) (14 - 15)  SpO2: 95% (11-27-18 @ 06:24) (93% - 98%)  Wt(kg): --Vital Signs Last 24 Hrs  T(C): 37.1 (27 Nov 2018 06:24), Max: 37.7 (26 Nov 2018 15:07)  T(F): 98.8 (27 Nov 2018 06:24), Max: 99.8 (26 Nov 2018 15:07)  HR: 73 (27 Nov 2018 06:24) (73 - 89)  BP: 121/55 (27 Nov 2018 06:24) (100/60 - 132/96)  BP(mean): --  RR: 15 (27 Nov 2018 06:24) (14 - 15)  SpO2: 95% (27 Nov 2018 06:24) (93% - 98%)    T(F): 98.8 (11-27-18 @ 06:24), Max: 99.8 (11-26-18 @ 15:07)  HR: 73 (11-27-18 @ 06:24) (59 - 89)  BP: 121/55 (11-27-18 @ 06:24) (89/67 - 152/73)  RR: 15 (11-27-18 @ 06:24) (14 - 16)  SpO2: 95% (11-27-18 @ 06:24) (93% - 98%)    PHYSICAL EXAM:  GENERAL: NAD, well-groomed, well-developed  HEAD:  Atraumatic, Normocephalic  EYES: EOMI, PERRLA, conjunctiva and sclera clear  ENMT: No tonsillar erythema, exudates, or enlargement; Moist mucous membranes, Good dentition, No lesions  NECK: Supple, No JVD, Normal thyroid  NERVOUS SYSTEM:  Alert & Oriented X3, Good concentration; Motor Strength 5/5 B/L upper and lower extremities; DTRs 2+ intact and symmetric  CHEST/LUNG: Clear to percussion bilaterally; No rales, rhonchi, wheezing, or rubs  HEART: Regular rate and rhythm; No murmurs, rubs, or gallops  ABDOMEN: Soft, Nontender, Nondistended; Bowel sounds present  EXTREMITIES:  2+ Peripheral Pulses, No clubbing, cyanosis, or edema  LYMPH: No lymphadenopathy noted  SKIN: No rashes or lesions    Consultant(s) Notes Reviewed:  [x ] YES  [ ] NO  Care Discussed with Consultants/Other Providers [ x] YES  [ ] NO    LABS:                              11.6   7.5   )-----------( 176      ( 26 Nov 2018 05:45 )             35.2       Culture - Urine (collected 11-24-18 @ 21:30)  Source: .Urine Clean Catch (Midstream)  Final Report (11-26-18 @ 11:48):    No growth                             11.6   7.5   )-----------( 176      ( 11-26 @ 05:45 )             35.2                10.2   15.6  )-----------( 145      ( 11-24 @ 06:10 )             31.8                12.0   11.1  )-----------( 195      ( 11-23 @ 15:00 )             36.8               RADIOLOGY & ADDITIONAL TESTS:    Imaging Personally Reviewed:  [ ] YES  [ ] NO  acetaminophen   Tablet .. 650 milliGRAM(s) Oral every 6 hours PRN  ALBUTerol    0.083% 2.5 milliGRAM(s) Nebulizer every 8 hours  aspirin  chewable 81 milliGRAM(s) Oral daily  cefTRIAXone   IVPB 1 Gram(s) IV Intermittent every 24 hours  dextrose 5% + sodium chloride 0.45% with potassium chloride 20 mEq/L 1000 milliLiter(s) IV Continuous <Continuous>  docusate sodium 100 milliGRAM(s) Oral two times a day  doxycycline hyclate Capsule 100 milliGRAM(s) Oral every 12 hours  heparin  Injectable 5000 Unit(s) SubCutaneous every 8 hours  latanoprost 0.005% Ophthalmic Solution 1 Drop(s) Both EYES at bedtime  midodrine 10 milliGRAM(s) Oral three times a day PRN  mirtazapine 15 milliGRAM(s) Oral daily  polyethylene glycol 3350 17 Gram(s) Oral daily  senna 2 Tablet(s) Oral at bedtime      HEALTH ISSUES - PROBLEM Dx:  Depression: Depression  PNA (pneumonia): PNA (pneumonia)  Depression, unspecified depression type: Depression, unspecified depression type  Prophylactic measure: Prophylactic measure  Moderate episode of recurrent major depressive disorder: Moderate episode of recurrent major depressive disorder  Idiopathic hypotension: Idiopathic hypotension  Pneumonia of right lower lobe due to infectious organism: Pneumonia of right lower lobe due to infectious organism
CC: f/u for RSV and secondary bacterial pneumonia    Patient reports still with occas cough, no SOB at rest    REVIEW OF SYSTEMS:  All other review of systems negative (Comprehensive ROS)    Antimicrobials Day # 4  cefTRIAXone   IVPB 1 Gram(s) IV Intermittent every 24 hours  doxycycline hyclate Capsule 100 milliGRAM(s) Oral every 12 hours    Other Medications Reviewed    T(F): 97.4 (11-27-18 @ 15:53), Max: 99 (11-26-18 @ 20:54)  HR: 81 (11-27-18 @ 15:53)  BP: 114/66 (11-27-18 @ 15:53)  RR: 14 (11-27-18 @ 15:53)  SpO2: 97% (11-27-18 @ 15:53)  Wt(kg): --    PHYSICAL EXAM:  General: alert, no acute distress  Eyes:  anicteric, no conjunctival injection, no discharge  Oropharynx: no lesions or injection 	  Neck: supple, without adenopathy  Lungs: coarse BSs, no wheezes  Heart: regular rate and rhythm; no murmur, rubs or gallops  Abdomen: soft, nondistended, nontender, without mass or organomegaly  Skin: no lesions  Extremities: no clubbing, cyanosis, or edema  Neurologic: alert, moves all extremities    LAB RESULTS:                        11.6   7.5   )-----------( 176      ( 26 Nov 2018 05:45 )             35.2       MICROBIOLOGY:  RECENT CULTURES:  11-24 @ 21:30 .Urine Clean Catch (Midstream)     No growth    11-23 @ 15:00 .Blood Blood-Peripheral     No growth to date.    RADIOLOGY REVIEWED:  CT Chest No Cont (11.23.18 @ 16:49) >  1.  Consolidation within the basilar right lower lobe representing   pneumonia. Recommend follow-up in 4 weeks to determine resolution.    2.  The thyroid gland is enlarged with multiple nodules. Recommend   further evaluation with dedicated ultrasound exam.
Follow-up Pulm Progress Note    Ignacio Martinez MD  [phone]    No new respiratory events overnight.  Denies SOB/CP.  Feeling better    Medications:  Vital Signs Last 24 Hrs  T(C): 37.1 (27 Nov 2018 06:24), Max: 37.7 (26 Nov 2018 15:07)  T(F): 98.8 (27 Nov 2018 06:24), Max: 99.8 (26 Nov 2018 15:07)  HR: 73 (27 Nov 2018 06:24) (73 - 89)  BP: 121/55 (27 Nov 2018 06:24) (121/55 - 132/96)  BP(mean): --  RR: 15 (27 Nov 2018 06:24) (14 - 15)  SpO2: 95% (27 Nov 2018 06:24) (93% - 98%)          11-26 @ 07:01  -  11-27 @ 07:00  --------------------------------------------------------  IN: 470 mL / OUT: 0 mL / NET: 470 mL        LABS:                        11.6   7.5   )-----------( 176      ( 26 Nov 2018 05:45 )             35.2               Procalcitonin, Serum: 1.73 ng/mL (11-25-18 @ 05:40)        CULTURES:  Culture Results:   No growth (11-24 @ 21:30)  Culture Results:   No growth to date. (11-23 @ 15:00)  Culture Results:   No growth to date. (11-23 @ 15:00)    Most recent blood culture -- 11-24 @ 21:30   -- -- .Urine Clean Catch (Midstream) 11-24 @ 21:30  Most recent blood culture -- 11-23 @ 15:00   -- -- .Blood Blood-Peripheral 11-23 @ 15:00      Physical Examination:  PULM: Clear to auscultation bilaterally, no significant sputum production  CVS: Regular rate and rhythm, no murmurs, rubs, or gallops  ABD: Soft, non-tender  EXT:  No clubbing, cyanosis, or edema
Follow-up Pulm Progress Note    Ignacio Martinez MD  [phone]    No new respiratory events overnight.  Denies SOB/CP.  Less cough.  Feels stronger today.    Medications:  Vital Signs Last 24 Hrs  T(C): 37 (25 Nov 2018 20:53), Max: 37 (25 Nov 2018 20:53)  T(F): 98.6 (25 Nov 2018 20:53), Max: 98.6 (25 Nov 2018 20:53)  HR: 75 (25 Nov 2018 20:53) (75 - 77)  BP: 100/60 (26 Nov 2018 11:13) (89/67 - 152/73)  BP(mean): --  RR: 15 (25 Nov 2018 20:53) (15 - 16)  SpO2: 93% (26 Nov 2018 14:29) (93% - 97%)          11-25 @ 07:01  -  11-26 @ 07:00  --------------------------------------------------------  IN: 600 mL / OUT: 0 mL / NET: 600 mL        LABS:                        11.6   7.5   )-----------( 176      ( 26 Nov 2018 05:45 )             35.2     11-25    143  |  107  |  13  ----------------------------<  102<H>  3.2<L>   |  29  |  0.86    Ca    9.7      25 Nov 2018 05:40          Procalcitonin, Serum: 1.73 ng/mL (11-25-18 @ 05:40)        CULTURES:  Culture Results:   No growth (11-24 @ 21:30)  Culture Results:   No growth to date. (11-23 @ 15:00)  Culture Results:   No growth to date. (11-23 @ 15:00)    Most recent blood culture -- 11-24 @ 21:30   -- -- .Urine Clean Catch (Midstream) 11-24 @ 21:30  Most recent blood culture -- 11-23 @ 15:00   -- -- .Blood Blood-Peripheral 11-23 @ 15:00      Physical Examination:  PULM: Clear to auscultation bilaterally, no significant sputum production  CVS: Regular rate and rhythm, no murmurs, rubs, or gallops  ABD: Soft, non-tender  EXT:  No clubbing, cyanosis, or edema
Follow-up Pulm Progress Note    Ignacio Martinez MD  [phone]    No new respiratory events overnight.  Denies SOB/CP.  Cough slightly worse    Medications:  Vital Signs Last 24 Hrs  T(C): 37.2 (25 Nov 2018 06:09), Max: 37.2 (25 Nov 2018 06:09)  T(F): 99 (25 Nov 2018 06:09), Max: 99 (25 Nov 2018 06:09)  HR: 81 (25 Nov 2018 06:15) (67 - 82)  BP: 120/70 (25 Nov 2018 06:09) (120/70 - 125/45)  BP(mean): --  RR: 14 (25 Nov 2018 06:09) (14 - 14)  SpO2: 95% (25 Nov 2018 06:15) (95% - 96%)          11-24 @ 07:01  -  11-25 @ 07:00  --------------------------------------------------------  IN: 1500 mL / OUT: 0 mL / NET: 1500 mL        LABS:                        10.2   15.6  )-----------( 145      ( 24 Nov 2018 06:10 )             31.8     11-25    143  |  107  |  13  ----------------------------<  102<H>  3.2<L>   |  29  |  0.86    Ca    9.7      25 Nov 2018 05:40    TPro  8.2  /  Alb  3.2<L>  /  TBili  0.7  /  DBili  x   /  AST  53<H>  /  ALT  24  /  AlkPhos  63  11-23      PT/INR - ( 23 Nov 2018 15:00 )   PT: 13.5 sec;   INR: 1.20 ratio         PTT - ( 23 Nov 2018 15:00 )  PTT:29.2 sec  Procalcitonin, Serum: 1.73 ng/mL (11-25-18 @ 05:40)    Serum Pro-Brain Natriuretic Peptide: 2057 pg/mL (11-23-18 @ 15:00)      CULTURES:  Culture Results:   No growth to date. (11-23 @ 15:00)  Culture Results:   No growth to date. (11-23 @ 15:00)    Most recent blood culture -- 11-23 @ 15:00   -- -- .Blood Blood-Peripheral 11-23 @ 15:00      Physical Examination:  PULM: Clear to auscultation bilaterally,moist cough today  CVS: Regular rate and rhythm, no murmurs, rubs, or gallops  ABD: Soft, non-tender  EXT:  No clubbing, cyanosis, or edema    RADIOLOGY REVIEWED  CXR:    CT chest:    TTE:
Follow-up Pulm Progress Note    Ignacio Martinez MD  [phone]    No new respiratory events overnight.  Denies SOB/CP. Less cough    Medications:  Vital Signs Last 24 Hrs  T(C): 36.7 (28 Nov 2018 06:57), Max: 36.7 (27 Nov 2018 22:36)  T(F): 98 (28 Nov 2018 06:57), Max: 98 (27 Nov 2018 22:36)  HR: 71 (28 Nov 2018 06:57) (60 - 81)  BP: 126/55 (28 Nov 2018 06:57) (114/66 - 126/55)  BP(mean): --  RR: 14 (28 Nov 2018 06:57) (14 - 14)  SpO2: 94% (28 Nov 2018 06:57) (94% - 97%)          11-27 @ 07:01  -  11-28 @ 07:00  --------------------------------------------------------  IN: 1760 mL / OUT: 1 mL / NET: 1759 mL                            CULTURES:  Culture Results:   No growth (11-24 @ 21:30)  Culture Results:   No growth to date. (11-23 @ 15:00)  Culture Results:   No growth to date. (11-23 @ 15:00)    Most recent blood culture -- 11-24 @ 21:30   -- -- .Urine Clean Catch (Midstream) 11-24 @ 21:30  Most recent blood culture -- 11-23 @ 15:00   -- -- .Blood Blood-Peripheral 11-23 @ 15:00      Physical Examination:  PULM: Clear to auscultation bilaterally, no significant sputum production  CVS: Regular rate and rhythm, no murmurs, rubs, or gallops  ABD: Soft, non-tender  EXT:  No clubbing, cyanosis, or edema
Patient is a 89y old  Female who presents with a chief complaint of cough (23 Nov 2018 17:56)      Patient seen and examined at bedside.    ALLERGIES:  No Known Allergies    MEDICATIONS  (STANDING):  aspirin  chewable 81 milliGRAM(s) Oral daily  azithromycin   Tablet 500 milliGRAM(s) Oral daily  cefTRIAXone   IVPB 1 Gram(s) IV Intermittent every 24 hours  docusate sodium 100 milliGRAM(s) Oral two times a day  heparin  Injectable 5000 Unit(s) SubCutaneous every 8 hours  latanoprost 0.005% Ophthalmic Solution 1 Drop(s) Both EYES at bedtime  mirtazapine 15 milliGRAM(s) Oral daily  polyethylene glycol 3350 17 Gram(s) Oral daily  senna 2 Tablet(s) Oral at bedtime  sodium chloride 0.9%. 1000 milliLiter(s) (75 mL/Hr) IV Continuous <Continuous>    MEDICATIONS  (PRN):  midodrine 10 milliGRAM(s) Oral three times a day PRN hypotension    Vital Signs Last 24 Hrs  T(F): 98.3 (24 Nov 2018 06:53), Max: 102.8 (23 Nov 2018 15:35)  HR: 59 (24 Nov 2018 06:53) (59 - 90)  BP: 110/60 (24 Nov 2018 06:53) (89/57 - 142/61)  RR: 14 (24 Nov 2018 06:53) (14 - 18)  SpO2: 96% (24 Nov 2018 06:53) (92% - 97%)  I&O's Summary    23 Nov 2018 07:01  -  24 Nov 2018 07:00  --------------------------------------------------------  IN: 1240 mL / OUT: 0 mL / NET: 1240 mL      BMI (kg/m2): 19.2 (11-23-18 @ 19:13)  PHYSICAL EXAM:  General: NAD, A/O x 3  ENT: MMM  Neck: Supple, No JVD  Lungs: Clear to auscultation bilaterally  Cardio: RRR, S1/S2, No murmurs  Abdomen: Soft, Nontender, Nondistended; Bowel sounds present  Extremities: No calf tenderness, No pitting edema    LABS:                        12.0   11.1  )-----------( 195      ( 23 Nov 2018 15:00 )             36.8       11-23    141  |  102  |  18  ----------------------------<  110  4.8   |  30  |  0.97    Ca    10.3      23 Nov 2018 15:00    TPro  8.2  /  Alb  3.2  /  TBili  0.7  /  DBili  x   /  AST  53  /  ALT  24  /  AlkPhos  63  11-23     eGFR if Non African American: 52 mL/min/1.73M2 (11-23-18 @ 15:00)  eGFR if African American: 60 mL/min/1.73M2 (11-23-18 @ 15:00)    PT/INR - ( 23 Nov 2018 15:00 )   PT: 13.5 sec;   INR: 1.20 ratio       PTT - ( 23 Nov 2018 15:00 )  PTT:29.2 sec   Lactate, Blood: 1.3 mmol/L (11-23 @ 15:00)    CAPILLARY BLOOD GLUCOSE    RADIOLOGY & ADDITIONAL TESTS:    Care Discussed with Consultants/Other Providers:
Patient is a 89y old  Female who presents with a chief complaint of cough (24 Nov 2018 13:00)      INTERVAL HPI/OVERNIGHT EVENTS: alert and communicative and still complaining of coughing  REVIEW OF SYSTEMS:  CONSTITUTIONAL: No fever, weight loss, or fatigue  EYES: No eye pain, visual disturbances, or discharge  ENMT:  No difficulty hearing, tinnitus, vertigo; No sinus or throat pain  NECK: No pain or stiffness  BREASTS: No pain, masses, or nipple discharge  RESPIRATORY: No cough, wheezing, chills or hemoptysis; No shortness of breath  CARDIOVASCULAR: No chest pain, palpitations, dizziness, or leg swelling  GASTROINTESTINAL: No abdominal or epigastric pain. No nausea, vomiting, or hematemesis; No diarrhea or constipation. No melena or hematochezia.  GENITOURINARY: No dysuria, frequency, hematuria, or incontinence  NEUROLOGICAL: No headaches, memory loss, loss of strength, numbness, or tremors  SKIN: No itching, burning, rashes, or lesions   LYMPH NODES: No enlarged glands  ENDOCRINE: No heat or cold intolerance; No hair loss  MUSCULOSKELETAL: No joint pain or swelling; No muscle, back, or extremity pain  PSYCHIATRIC: No depression, anxiety, mood swings, or difficulty sleeping  HEME/LYMPH: No easy bruising, or bleeding gums  ALLERY AND IMMUNOLOGIC: No hives or eczema  FAMILY HISTORY:  No pertinent family history in first degree relatives    T(C): 37.2 (11-25-18 @ 06:09), Max: 37.2 (11-25-18 @ 06:09)  HR: 81 (11-25-18 @ 06:15) (59 - 82)  BP: 120/70 (11-25-18 @ 06:09) (118/42 - 125/45)  RR: 14 (11-25-18 @ 06:09) (14 - 14)  SpO2: 95% (11-25-18 @ 06:15) (95% - 97%)  Wt(kg): --Vital Signs Last 24 Hrs  T(C): 37.2 (25 Nov 2018 06:09), Max: 37.2 (25 Nov 2018 06:09)  T(F): 99 (25 Nov 2018 06:09), Max: 99 (25 Nov 2018 06:09)  HR: 81 (25 Nov 2018 06:15) (59 - 82)  BP: 120/70 (25 Nov 2018 06:09) (118/42 - 125/45)  BP(mean): --  RR: 14 (25 Nov 2018 06:09) (14 - 14)  SpO2: 95% (25 Nov 2018 06:15) (95% - 97%)    T(F): 99 (11-25-18 @ 06:09), Max: 102.8 (11-23-18 @ 15:35)  HR: 81 (11-25-18 @ 06:15) (59 - 90)  BP: 120/70 (11-25-18 @ 06:09) (89/57 - 142/61)  RR: 14 (11-25-18 @ 06:09) (14 - 18)  SpO2: 95% (11-25-18 @ 06:15) (92% - 97%)    PHYSICAL EXAM:  GENERAL: NAD, well-groomed, well-developed  HEAD:  Atraumatic, Normocephalic  EYES: EOMI, PERRLA, conjunctiva and sclera clear  ENMT: No tonsillar erythema, exudates, or enlargement; Moist mucous membranes, Good dentition, No lesions  NECK: Supple, No JVD, Normal thyroid  NERVOUS SYSTEM:  Alert & Oriented X3, Good concentration; Motor Strength 5/5 B/L upper and lower extremities; DTRs 2+ intact and symmetric  CHEST/LUNG: Clear to percussion bilaterally; No rales, rhonchi, wheezing, or rubs  HEART: Regular rate and rhythm; No murmurs, rubs, or gallops  ABDOMEN: Soft, Nontender, Nondistended; Bowel sounds present  EXTREMITIES:  2+ Peripheral Pulses, No clubbing, cyanosis, or edema  LYMPH: No lymphadenopathy noted  SKIN: No rashes or lesions    Consultant(s) Notes Reviewed:  [x ] YES  [ ] NO  Care Discussed with Consultants/Other Providers [ x] YES  [ ] NO    LABS:  11-25    143  |  107  |  13  ----------------------------<  102<H>  3.2<L>   |  29  |  0.86    Ca    9.7      25 Nov 2018 05:40    TPro  8.2  /  Alb  3.2<L>  /  TBili  0.7  /  DBili  x   /  AST  53<H>  /  ALT  24  /  AlkPhos  63  11-23                          10.2   15.6  )-----------( 145      ( 24 Nov 2018 06:10 )             31.8       Culture - Blood (collected 11-23-18 @ 15:00)  Source: .Blood Blood-Peripheral  Preliminary Report (11-24-18 @ 19:01):    No growth to date.    Culture - Blood (collected 11-23-18 @ 15:00)  Source: .Blood Blood-Peripheral  Preliminary Report (11-24-18 @ 19:01):    No growth to date.        PT/INR - ( 23 Nov 2018 15:00 )   PT: 13.5 sec;   INR: 1.20 ratio         PTT - ( 23 Nov 2018 15:00 )  PTT:29.2 sec  LIVER FUNCTIONS - ( 23 Nov 2018 15:00 )  Alb: 3.2 g/dL / Pro: 8.2 g/dL / ALK PHOS: 63 U/L / ALT: 24 U/L DA / AST: 53 U/L / GGT: x                            10.2   15.6  )-----------( 145      ( 11-24 @ 06:10 )             31.8                12.0   11.1  )-----------( 195      ( 11-23 @ 15:00 )             36.8               RADIOLOGY & ADDITIONAL TESTS:    Imaging Personally Reviewed:  [ ] YES  [ ] NO  ALBUTerol   0.5% 2.5 milliGRAM(s) Nebulizer every 8 hours  aspirin  chewable 81 milliGRAM(s) Oral daily  cefTRIAXone   IVPB 1 Gram(s) IV Intermittent every 24 hours  dextrose 5% + sodium chloride 0.45% with potassium chloride 20 mEq/L 1000 milliLiter(s) IV Continuous <Continuous>  docusate sodium 100 milliGRAM(s) Oral two times a day  doxycycline hyclate Capsule 100 milliGRAM(s) Oral every 12 hours  heparin  Injectable 5000 Unit(s) SubCutaneous every 8 hours  latanoprost 0.005% Ophthalmic Solution 1 Drop(s) Both EYES at bedtime  midodrine 10 milliGRAM(s) Oral three times a day PRN  mirtazapine 15 milliGRAM(s) Oral daily  polyethylene glycol 3350 17 Gram(s) Oral daily  potassium chloride    Tablet ER 20 milliEquivalent(s) Oral every 2 hours  senna 2 Tablet(s) Oral at bedtime      HEALTH ISSUES - PROBLEM Dx:  PNA (pneumonia): PNA (pneumonia)  Depression, unspecified depression type: Depression, unspecified depression type  Prophylactic measure: Prophylactic measure  Moderate episode of recurrent major depressive disorder: Moderate episode of recurrent major depressive disorder  Idiopathic hypotension: Idiopathic hypotension  Pneumonia of right lower lobe due to infectious organism: Pneumonia of right lower lobe due to infectious organism
Patient is a 89y old  Female who presents with a chief complaint of cough (26 Nov 2018 15:07)      INTERVAL HPI/OVERNIGHT EVENTS: feels improved with diminished cough      REVIEW OF SYSTEMS:  CONSTITUTIONAL: No fever, weight loss, or fatigue  EYES: No eye pain, visual disturbances, or discharge  ENMT:  No difficulty hearing, tinnitus, vertigo; No sinus or throat pain  NECK: No pain or stiffness  BREASTS: No pain, masses, or nipple discharge  RESPIRATORY: No cough, wheezing, chills or hemoptysis; No shortness of breath  CARDIOVASCULAR: No chest pain, palpitations, dizziness, or leg swelling  GASTROINTESTINAL: No abdominal or epigastric pain. No nausea, vomiting, or hematemesis; No diarrhea or constipation. No melena or hematochezia.  GENITOURINARY: No dysuria, frequency, hematuria, or incontinence  NEUROLOGICAL: No headaches, memory loss, loss of strength, numbness, or tremors  SKIN: No itching, burning, rashes, or lesions   LYMPH NODES: No enlarged glands  ENDOCRINE: No heat or cold intolerance; No hair loss  MUSCULOSKELETAL: No joint pain or swelling; No muscle, back, or extremity pain  PSYCHIATRIC: No depression, anxiety, mood swings, or difficulty sleeping  HEME/LYMPH: No easy bruising, or bleeding gums  ALLERY AND IMMUNOLOGIC: No hives or eczema  FAMILY HISTORY:  No pertinent family history in first degree relatives    T(C): 37.7 (11-26-18 @ 15:07), Max: 37.7 (11-26-18 @ 15:07)  HR: 89 (11-26-18 @ 15:07) (75 - 89)  BP: 132/96 (11-26-18 @ 15:07) (100/60 - 152/73)  RR: 14 (11-26-18 @ 15:07) (14 - 15)  SpO2: 98% (11-26-18 @ 15:07) (93% - 98%)  Wt(kg): --Vital Signs Last 24 Hrs  T(C): 37.7 (26 Nov 2018 15:07), Max: 37.7 (26 Nov 2018 15:07)  T(F): 99.8 (26 Nov 2018 15:07), Max: 99.8 (26 Nov 2018 15:07)  HR: 89 (26 Nov 2018 15:07) (75 - 89)  BP: 132/96 (26 Nov 2018 15:07) (100/60 - 152/73)  BP(mean): --  RR: 14 (26 Nov 2018 15:07) (14 - 15)  SpO2: 98% (26 Nov 2018 15:07) (93% - 98%)    T(F): 99.8 (11-26-18 @ 15:07), Max: 100.3 (11-23-18 @ 19:53)  HR: 89 (11-26-18 @ 15:07) (59 - 89)  BP: 132/96 (11-26-18 @ 15:07) (89/67 - 152/73)  RR: 14 (11-26-18 @ 15:07) (14 - 16)  SpO2: 98% (11-26-18 @ 15:07) (92% - 98%)    PHYSICAL EXAM:  GENERAL: NAD, well-groomed, well-developed  HEAD:  Atraumatic, Normocephalic  EYES: EOMI, PERRLA, conjunctiva and sclera clear  ENMT: No tonsillar erythema, exudates, or enlargement; Moist mucous membranes, Good dentition, No lesions  NECK: Supple, No JVD, Normal thyroid  NERVOUS SYSTEM:  Alert & Oriented X3, Good concentration; Motor Strength 5/5 B/L upper and lower extremities; DTRs 2+ intact and symmetric  CHEST/LUNG: Clear to percussion bilaterally; No rales, rhonchi, wheezing, or rubs  HEART: Regular rate and rhythm; No murmurs, rubs, or gallops  ABDOMEN: Soft, Nontender, Nondistended; Bowel sounds present  EXTREMITIES:  2+ Peripheral Pulses, No clubbing, cyanosis, or edema  LYMPH: No lymphadenopathy noted  SKIN: No rashes or lesions    Consultant(s) Notes Reviewed:  [x ] YES  [ ] NO  Care Discussed with Consultants/Other Providers [ x] YES  [ ] NO    LABS:  11-25    143  |  107  |  13  ----------------------------<  102<H>  3.2<L>   |  29  |  0.86    Ca    9.7      25 Nov 2018 05:40                            11.6   7.5   )-----------( 176      ( 26 Nov 2018 05:45 )             35.2       Culture - Urine (collected 11-24-18 @ 21:30)  Source: .Urine Clean Catch (Midstream)  Final Report (11-26-18 @ 11:48):    No growth                             11.6   7.5   )-----------( 176      ( 11-26 @ 05:45 )             35.2                10.2   15.6  )-----------( 145      ( 11-24 @ 06:10 )             31.8                12.0   11.1  )-----------( 195      ( 11-23 @ 15:00 )             36.8               RADIOLOGY & ADDITIONAL TESTS:    Imaging Personally Reviewed:  [ ] YES  [ ] NO  acetaminophen   Tablet .. 650 milliGRAM(s) Oral every 6 hours PRN  ALBUTerol    0.083% 2.5 milliGRAM(s) Nebulizer every 8 hours  aspirin  chewable 81 milliGRAM(s) Oral daily  cefTRIAXone   IVPB 1 Gram(s) IV Intermittent every 24 hours  dextrose 5% + sodium chloride 0.45% with potassium chloride 20 mEq/L 1000 milliLiter(s) IV Continuous <Continuous>  docusate sodium 100 milliGRAM(s) Oral two times a day  doxycycline hyclate Capsule 100 milliGRAM(s) Oral every 12 hours  heparin  Injectable 5000 Unit(s) SubCutaneous every 8 hours  latanoprost 0.005% Ophthalmic Solution 1 Drop(s) Both EYES at bedtime  midodrine 10 milliGRAM(s) Oral three times a day PRN  mirtazapine 15 milliGRAM(s) Oral daily  polyethylene glycol 3350 17 Gram(s) Oral daily  senna 2 Tablet(s) Oral at bedtime      HEALTH ISSUES - PROBLEM Dx:  Depression: Depression  PNA (pneumonia): PNA (pneumonia)  Depression, unspecified depression type: Depression, unspecified depression type  Prophylactic measure: Prophylactic measure  Moderate episode of recurrent major depressive disorder: Moderate episode of recurrent major depressive disorder  Idiopathic hypotension: Idiopathic hypotension  Pneumonia of right lower lobe due to infectious organism: Pneumonia of right lower lobe due to infectious organism
Patient is a 89y old  Female who presents with a chief complaint of cough (27 Nov 2018 17:57)      INTERVAL HPI/OVERNIGHT EVENTS:improved still with slight cough      REVIEW OF SYSTEMS:  CONSTITUTIONAL: No fever, weight loss, or fatigue  EYES: No eye pain, visual disturbances, or discharge  ENMT:  No difficulty hearing, tinnitus, vertigo; No sinus or throat pain  NECK: No pain or stiffness  BREASTS: No pain, masses, or nipple discharge  RESPIRATORY: No cough, wheezing, chills or hemoptysis; No shortness of breath  CARDIOVASCULAR: No chest pain, palpitations, dizziness, or leg swelling  GASTROINTESTINAL: No abdominal or epigastric pain. No nausea, vomiting, or hematemesis; No diarrhea or constipation. No melena or hematochezia.  GENITOURINARY: No dysuria, frequency, hematuria, or incontinence  NEUROLOGICAL: No headaches, memory loss, loss of strength, numbness, or tremors  SKIN: No itching, burning, rashes, or lesions   LYMPH NODES: No enlarged glands  ENDOCRINE: No heat or cold intolerance; No hair loss  MUSCULOSKELETAL: No joint pain or swelling; No muscle, back, or extremity pain  PSYCHIATRIC: No depression, anxiety, mood swings, or difficulty sleeping  HEME/LYMPH: No easy bruising, or bleeding gums  ALLERY AND IMMUNOLOGIC: No hives or eczema  FAMILY HISTORY:  No pertinent family history in first degree relatives    T(C): 36.7 (11-28-18 @ 06:57), Max: 36.7 (11-27-18 @ 22:36)  HR: 71 (11-28-18 @ 06:57) (60 - 81)  BP: 126/55 (11-28-18 @ 06:57) (114/66 - 126/55)  RR: 14 (11-28-18 @ 06:57) (14 - 14)  SpO2: 94% (11-28-18 @ 06:57) (94% - 97%)  Wt(kg): --Vital Signs Last 24 Hrs  T(C): 36.7 (28 Nov 2018 06:57), Max: 36.7 (27 Nov 2018 22:36)  T(F): 98 (28 Nov 2018 06:57), Max: 98 (27 Nov 2018 22:36)  HR: 71 (28 Nov 2018 06:57) (60 - 81)  BP: 126/55 (28 Nov 2018 06:57) (114/66 - 126/55)  BP(mean): --  RR: 14 (28 Nov 2018 06:57) (14 - 14)  SpO2: 94% (28 Nov 2018 06:57) (94% - 97%)    T(F): 98 (11-28-18 @ 06:57), Max: 99.8 (11-26-18 @ 15:07)  HR: 71 (11-28-18 @ 06:57) (60 - 89)  BP: 126/55 (11-28-18 @ 06:57) (89/67 - 152/73)  RR: 14 (11-28-18 @ 06:57) (14 - 16)  SpO2: 94% (11-28-18 @ 06:57) (93% - 98%)    PHYSICAL EXAM:  GENERAL: NAD, well-groomed, well-developed  HEAD:  Atraumatic, Normocephalic  EYES: EOMI, PERRLA, conjunctiva and sclera clear  ENMT: No tonsillar erythema, exudates, or enlargement; Moist mucous membranes, Good dentition, No lesions  NECK: Supple, No JVD, Normal thyroid  NERVOUS SYSTEM:  Alert & Oriented X3, Good concentration; Motor Strength 5/5 B/L upper and lower extremities; DTRs 2+ intact and symmetric  CHEST/LUNG: Clear to percussion bilaterally; No rales, rhonchi, wheezing, or rubs  HEART: Regular rate and rhythm; No murmurs, rubs, or gallops  ABDOMEN: Soft, Nontender, Nondistended; Bowel sounds present  EXTREMITIES:  2+ Peripheral Pulses, No clubbing, cyanosis, or edema  LYMPH: No lymphadenopathy noted  SKIN: No rashes or lesions    Consultant(s) Notes Reviewed:  [x ] YES  [ ] NO  Care Discussed with Consultants/Other Providers [ x] YES  [ ] NO    LABS:                                   11.6   7.5   )-----------( 176      ( 11-26 @ 05:45 )             35.2                10.2   15.6  )-----------( 145      ( 11-24 @ 06:10 )             31.8                12.0   11.1  )-----------( 195      ( 11-23 @ 15:00 )             36.8               RADIOLOGY & ADDITIONAL TESTS:    Imaging Personally Reviewed:  [ ] YES  [ ] NO  acetaminophen   Tablet .. 650 milliGRAM(s) Oral every 6 hours PRN  ALBUTerol    0.083% 2.5 milliGRAM(s) Nebulizer every 8 hours  aspirin  chewable 81 milliGRAM(s) Oral daily  cefTRIAXone   IVPB 1 Gram(s) IV Intermittent every 24 hours  dextrose 5% + sodium chloride 0.45% with potassium chloride 20 mEq/L 1000 milliLiter(s) IV Continuous <Continuous>  docusate sodium 100 milliGRAM(s) Oral two times a day  doxycycline hyclate Capsule 100 milliGRAM(s) Oral every 12 hours  heparin  Injectable 5000 Unit(s) SubCutaneous every 8 hours  latanoprost 0.005% Ophthalmic Solution 1 Drop(s) Both EYES at bedtime  midodrine 10 milliGRAM(s) Oral three times a day PRN  mirtazapine 15 milliGRAM(s) Oral daily  polyethylene glycol 3350 17 Gram(s) Oral daily  senna 2 Tablet(s) Oral at bedtime      HEALTH ISSUES - PROBLEM Dx:  Depression: Depression  PNA (pneumonia): PNA (pneumonia)  Depression, unspecified depression type: Depression, unspecified depression type  Prophylactic measure: Prophylactic measure  Moderate episode of recurrent major depressive disorder: Moderate episode of recurrent major depressive disorder  Idiopathic hypotension: Idiopathic hypotension  Pneumonia of right lower lobe due to infectious organism: Pneumonia of right lower lobe due to infectious organism

## 2018-12-10 PROBLEM — I95.9 HYPOTENSION, UNSPECIFIED: Chronic | Status: ACTIVE | Noted: 2018-11-23

## 2018-12-10 PROBLEM — F32.9 MAJOR DEPRESSIVE DISORDER, SINGLE EPISODE, UNSPECIFIED: Chronic | Status: ACTIVE | Noted: 2018-11-23

## 2018-12-10 PROBLEM — N31.9 NEUROMUSCULAR DYSFUNCTION OF BLADDER, UNSPECIFIED: Chronic | Status: ACTIVE | Noted: 2018-11-23

## 2018-12-10 PROBLEM — H40.9 UNSPECIFIED GLAUCOMA: Chronic | Status: ACTIVE | Noted: 2018-11-23

## 2018-12-20 ENCOUNTER — APPOINTMENT (OUTPATIENT)
Dept: INTERNAL MEDICINE | Facility: CLINIC | Age: 83
End: 2018-12-20
Payer: MEDICARE

## 2018-12-20 PROCEDURE — 99214 OFFICE O/P EST MOD 30 MIN: CPT

## 2018-12-20 NOTE — HEALTH RISK ASSESSMENT
[No falls in past year] : Patient reported no falls in the past year [0] : 1) Little interest or pleasure doing things: Not at all (0)

## 2018-12-21 VITALS
HEART RATE: 70 BPM | DIASTOLIC BLOOD PRESSURE: 70 MMHG | RESPIRATION RATE: 14 BRPM | HEIGHT: 65 IN | BODY MASS INDEX: 18.49 KG/M2 | SYSTOLIC BLOOD PRESSURE: 122 MMHG | WEIGHT: 111 LBS

## 2018-12-21 NOTE — HISTORY OF PRESENT ILLNESS
[Family Member] : family member [FreeTextEntry1] : Comes to the office for followup to review her medications and discuss her overall health [de-identified] : 80-year-old woman comes to the office accompanied by his daughter for followup has history significant for glaucoma insomnia bladder dysfunction in order, dysfunction patient denies chest pain shortness of breath exertional dyspnea lightheadedness palpitations dizziness vertigo or syncope he is having no temperature chills sweats myalgias headache sinus congestion sore throat cough wheezing abdominal pain nausea vomiting diarrhea constipation or blood per rectum or black stools patient has stopped her potassium supplementation as it was not covered and her insurance formulary

## 2018-12-21 NOTE — REVIEW OF SYSTEMS
[Nocturia] : nocturia [Frequency] : frequency [Joint Stiffness] : joint stiffness [Muscle Weakness] : muscle weakness [Back Pain] : back pain [Memory Loss] : memory loss [Unsteady Walking] : ataxia [Insomnia] : insomnia [Fever] : no fever [Chills] : no chills [Fatigue] : no fatigue [Hot Flashes] : no hot flashes [Night Sweats] : no night sweats [Recent Change In Weight] : ~T no recent weight change [Discharge] : no discharge [Pain] : no pain [Redness] : no redness [Dryness] : no dryness  [Vision Problems] : no vision problems [Itching] : no itching [Earache] : no earache [Hearing Loss] : no hearing loss [Nosebleed] : no nosebleeds [Hoarseness] : no hoarseness [Nasal Discharge] : no nasal discharge [Sore Throat] : no sore throat [Postnasal Drip] : no postnasal drip [Chest Pain] : no chest pain [Palpitations] : no palpitations [Leg Claudication] : no leg claudication [Lower Ext Edema] : no lower extremity edema [Orthopnea] : no orthopnea [Paroysmal Nocturnal Dyspnea] : no paroysmal nocturnal dyspnea [Shortness Of Breath] : no shortness of breath [Wheezing] : no wheezing [Cough] : no cough [Dyspnea on Exertion] : no dyspnea on exertion [Abdominal Pain] : no abdominal pain [Nausea] : no nausea [Constipation] : no constipation [Diarrhea] : diarrhea [Vomiting] : no vomiting [Heartburn] : no heartburn [Melena] : no melena [Dysuria] : no dysuria [Incontinence] : no incontinence [Poor Libido] : libido not poor [Hematuria] : no hematuria [Vaginal Discharge] : no vaginal discharge [Dysmenorrhea] : no dysmenorrhea [Joint Pain] : no joint pain [Joint Swelling] : no joint swelling [Muscle Pain] : no muscle pain [Mole Changes] : no mole changes [Nail Changes] : no nail changes [Hair Changes] : no hair changes [Skin Rash] : no skin rash [Headache] : no headache [Dizziness] : no dizziness [Fainting] : no fainting [Confusion] : no confusion [Suicidal] : not suicidal [Anxiety] : no anxiety [Depression] : no depression [Easy Bleeding] : no easy bleeding [Easy Bruising] : no easy bruising [Swollen Glands] : no swollen glands

## 2018-12-21 NOTE — ASSESSMENT
[FreeTextEntry1] : Physical exam shows an elderly woman in no acute distress blood pressure 122/70 height 5 foot 5 weight 201 pounds heart rate 70 respiratory rate 14 HEENT was unremarkable chest was clear cardiovascular exam regular 2/6 systolic murmur abdomen soft and showed no clubbing cyanosis or edema neurologic exam was nonfocal... patient's diet was reviewed just said that she small frequent meals rather than attempt to eat large meals she is up-to-date with her dermatologist an ophthalmologist physical therapy was suggested return to the office in 3 months time.Slip was given to repeat her potassium she has been off her supplementation if potassium levels are low will need to find a supplement covered under her insurance

## 2018-12-30 ENCOUNTER — RX RENEWAL (OUTPATIENT)
Age: 83
End: 2018-12-30

## 2019-01-07 ENCOUNTER — RX RENEWAL (OUTPATIENT)
Age: 84
End: 2019-01-07

## 2019-03-29 ENCOUNTER — RX RENEWAL (OUTPATIENT)
Age: 84
End: 2019-03-29

## 2019-05-03 ENCOUNTER — EMERGENCY (EMERGENCY)
Facility: HOSPITAL | Age: 84
LOS: 1 days | Discharge: HOME | End: 2019-05-03
Attending: EMERGENCY MEDICINE | Admitting: EMERGENCY MEDICINE
Payer: COMMERCIAL

## 2019-05-03 VITALS
TEMPERATURE: 98 F | HEART RATE: 67 BPM | SYSTOLIC BLOOD PRESSURE: 148 MMHG | WEIGHT: 115.08 LBS | OXYGEN SATURATION: 100 % | DIASTOLIC BLOOD PRESSURE: 73 MMHG | RESPIRATION RATE: 15 BRPM

## 2019-05-03 DIAGNOSIS — R31.9 HEMATURIA, UNSPECIFIED: ICD-10-CM

## 2019-05-03 DIAGNOSIS — Z90.710 ACQUIRED ABSENCE OF BOTH CERVIX AND UTERUS: Chronic | ICD-10-CM

## 2019-05-03 LAB
ALBUMIN SERPL ELPH-MCNC: 3.6 G/DL — SIGNIFICANT CHANGE UP (ref 3.3–5)
ALP SERPL-CCNC: 72 U/L — SIGNIFICANT CHANGE UP (ref 40–120)
ALT FLD-CCNC: 14 U/L DA — SIGNIFICANT CHANGE UP (ref 10–45)
ANION GAP SERPL CALC-SCNC: 7 MMOL/L — SIGNIFICANT CHANGE UP (ref 5–17)
APPEARANCE UR: ABNORMAL
APTT BLD: 28.8 SEC — SIGNIFICANT CHANGE UP (ref 27.5–36.3)
AST SERPL-CCNC: 17 U/L — SIGNIFICANT CHANGE UP (ref 10–40)
BASOPHILS # BLD AUTO: 0 K/UL — SIGNIFICANT CHANGE UP (ref 0–0.2)
BASOPHILS NFR BLD AUTO: 0.6 % — SIGNIFICANT CHANGE UP (ref 0–2)
BILIRUB SERPL-MCNC: 0.4 MG/DL — SIGNIFICANT CHANGE UP (ref 0.2–1.2)
BILIRUB UR-MCNC: NEGATIVE — SIGNIFICANT CHANGE UP
BUN SERPL-MCNC: 35 MG/DL — HIGH (ref 7–23)
CALCIUM SERPL-MCNC: 11 MG/DL — HIGH (ref 8.4–10.5)
CHLORIDE SERPL-SCNC: 106 MMOL/L — SIGNIFICANT CHANGE UP (ref 96–108)
CO2 SERPL-SCNC: 31 MMOL/L — SIGNIFICANT CHANGE UP (ref 22–31)
COLOR SPEC: ABNORMAL
CREAT SERPL-MCNC: 0.92 MG/DL — SIGNIFICANT CHANGE UP (ref 0.5–1.3)
DIFF PNL FLD: ABNORMAL
EOSINOPHIL # BLD AUTO: 0.1 K/UL — SIGNIFICANT CHANGE UP (ref 0–0.5)
EOSINOPHIL NFR BLD AUTO: 1.9 % — SIGNIFICANT CHANGE UP (ref 0–6)
GLUCOSE SERPL-MCNC: 91 MG/DL — SIGNIFICANT CHANGE UP (ref 70–99)
GLUCOSE UR QL: NEGATIVE — SIGNIFICANT CHANGE UP
HCT VFR BLD CALC: 39.7 % — SIGNIFICANT CHANGE UP (ref 34.5–45)
HGB BLD-MCNC: 12.6 G/DL — SIGNIFICANT CHANGE UP (ref 11.5–15.5)
INR BLD: 1.03 RATIO — SIGNIFICANT CHANGE UP (ref 0.88–1.16)
KETONES UR-MCNC: NEGATIVE — SIGNIFICANT CHANGE UP
LEUKOCYTE ESTERASE UR-ACNC: ABNORMAL
LYMPHOCYTES # BLD AUTO: 3.2 K/UL — SIGNIFICANT CHANGE UP (ref 1–3.3)
LYMPHOCYTES # BLD AUTO: 41.1 % — SIGNIFICANT CHANGE UP (ref 13–44)
MCHC RBC-ENTMCNC: 31.2 PG — SIGNIFICANT CHANGE UP (ref 27–34)
MCHC RBC-ENTMCNC: 31.8 GM/DL — LOW (ref 32–36)
MCV RBC AUTO: 98.2 FL — SIGNIFICANT CHANGE UP (ref 80–100)
MONOCYTES # BLD AUTO: 1 K/UL — HIGH (ref 0–0.9)
MONOCYTES NFR BLD AUTO: 13.4 % — HIGH (ref 1–9)
NEUTROPHILS # BLD AUTO: 3.3 K/UL — SIGNIFICANT CHANGE UP (ref 1.8–7.4)
NEUTROPHILS NFR BLD AUTO: 43.1 % — SIGNIFICANT CHANGE UP (ref 43–77)
NITRITE UR-MCNC: NEGATIVE — SIGNIFICANT CHANGE UP
OB PNL STL: POSITIVE
PH UR: 8 — SIGNIFICANT CHANGE UP (ref 5–8)
PLATELET # BLD AUTO: 190 K/UL — SIGNIFICANT CHANGE UP (ref 150–400)
POTASSIUM SERPL-MCNC: 4.8 MMOL/L — SIGNIFICANT CHANGE UP (ref 3.5–5.3)
POTASSIUM SERPL-SCNC: 4.8 MMOL/L — SIGNIFICANT CHANGE UP (ref 3.5–5.3)
PROT SERPL-MCNC: 8 G/DL — SIGNIFICANT CHANGE UP (ref 6–8.3)
PROT UR-MCNC: 15
PROTHROM AB SERPL-ACNC: 11.6 SEC — SIGNIFICANT CHANGE UP (ref 10–12.9)
RBC # BLD: 4.04 M/UL — SIGNIFICANT CHANGE UP (ref 3.8–5.2)
RBC # FLD: 13.7 % — SIGNIFICANT CHANGE UP (ref 10.3–14.5)
SODIUM SERPL-SCNC: 144 MMOL/L — SIGNIFICANT CHANGE UP (ref 135–145)
SP GR SPEC: 1.01 — SIGNIFICANT CHANGE UP (ref 1.01–1.02)
UROBILINOGEN FLD QL: NEGATIVE — SIGNIFICANT CHANGE UP
WBC # BLD: 7.7 K/UL — SIGNIFICANT CHANGE UP (ref 3.8–10.5)
WBC # FLD AUTO: 7.7 K/UL — SIGNIFICANT CHANGE UP (ref 3.8–10.5)

## 2019-05-03 PROCEDURE — 86901 BLOOD TYPING SEROLOGIC RH(D): CPT

## 2019-05-03 PROCEDURE — 87086 URINE CULTURE/COLONY COUNT: CPT

## 2019-05-03 PROCEDURE — 86900 BLOOD TYPING SEROLOGIC ABO: CPT

## 2019-05-03 PROCEDURE — 74177 CT ABD & PELVIS W/CONTRAST: CPT | Mod: 26

## 2019-05-03 PROCEDURE — 99284 EMERGENCY DEPT VISIT MOD MDM: CPT

## 2019-05-03 PROCEDURE — 82272 OCCULT BLD FECES 1-3 TESTS: CPT

## 2019-05-03 PROCEDURE — 96360 HYDRATION IV INFUSION INIT: CPT | Mod: XU

## 2019-05-03 PROCEDURE — 81001 URINALYSIS AUTO W/SCOPE: CPT

## 2019-05-03 PROCEDURE — 74177 CT ABD & PELVIS W/CONTRAST: CPT

## 2019-05-03 PROCEDURE — 86850 RBC ANTIBODY SCREEN: CPT

## 2019-05-03 PROCEDURE — 99284 EMERGENCY DEPT VISIT MOD MDM: CPT | Mod: 25

## 2019-05-03 PROCEDURE — 93010 ELECTROCARDIOGRAM REPORT: CPT

## 2019-05-03 PROCEDURE — 85730 THROMBOPLASTIN TIME PARTIAL: CPT

## 2019-05-03 PROCEDURE — 85610 PROTHROMBIN TIME: CPT

## 2019-05-03 PROCEDURE — 85027 COMPLETE CBC AUTOMATED: CPT

## 2019-05-03 PROCEDURE — 87186 SC STD MICRODIL/AGAR DIL: CPT

## 2019-05-03 PROCEDURE — 80053 COMPREHEN METABOLIC PANEL: CPT

## 2019-05-03 PROCEDURE — 93005 ELECTROCARDIOGRAM TRACING: CPT

## 2019-05-03 PROCEDURE — 36415 COLL VENOUS BLD VENIPUNCTURE: CPT

## 2019-05-03 RX ORDER — SODIUM CHLORIDE 9 MG/ML
3 INJECTION INTRAMUSCULAR; INTRAVENOUS; SUBCUTANEOUS ONCE
Qty: 0 | Refills: 0 | Status: COMPLETED | OUTPATIENT
Start: 2019-05-03 | End: 2019-05-03

## 2019-05-03 RX ORDER — SODIUM CHLORIDE 9 MG/ML
1000 INJECTION INTRAMUSCULAR; INTRAVENOUS; SUBCUTANEOUS ONCE
Qty: 0 | Refills: 0 | Status: COMPLETED | OUTPATIENT
Start: 2019-05-03 | End: 2019-05-03

## 2019-05-03 RX ADMIN — SODIUM CHLORIDE 1000 MILLILITER(S): 9 INJECTION INTRAMUSCULAR; INTRAVENOUS; SUBCUTANEOUS at 22:10

## 2019-05-03 RX ADMIN — SODIUM CHLORIDE 3 MILLILITER(S): 9 INJECTION INTRAMUSCULAR; INTRAVENOUS; SUBCUTANEOUS at 21:10

## 2019-05-03 RX ADMIN — SODIUM CHLORIDE 1000 MILLILITER(S): 9 INJECTION INTRAMUSCULAR; INTRAVENOUS; SUBCUTANEOUS at 21:10

## 2019-05-03 NOTE — ED ADULT NURSE NOTE - NSIMPLEMENTINTERV_GEN_ALL_ED
Implemented All Fall with Harm Risk Interventions:  [place] to call system. Call bell, personal items and telephone within reach. Instruct patient to call for assistance. Room bathroom lighting operational. Non-slip footwear when patient is off stretcher. Physically safe environment: no spills, clutter or unnecessary equipment. Stretcher in lowest position, wheels locked, appropriate side rails in place. Provide visual cue, wrist band, yellow gown, etc. Monitor gait and stability. Monitor for mental status changes and reorient to person, place, and time. Review medications for side effects contributing to fall risk. Reinforce activity limits and safety measures with patient and family. Provide visual clues: red socks.

## 2019-05-03 NOTE — ED ADULT NURSE NOTE - CHPI ED NUR SYMPTOMS NEG
no pain/no nausea/no fever/no chills/no dizziness/no tingling/no decreased eating/drinking/no vomiting

## 2019-05-03 NOTE — ED ADULT NURSE NOTE - OBJECTIVE STATEMENT
Pt came to ED with c/o of bleeding from perineal area. Pt and pt family noticed the bleeding today and is described as bright red and containing clots. Pt is unsure of where the bleeding is coming from. Pt daughter states that the pt wears depends and noticed the bleeding a few hours ago and verbalized that it was dripping down the patients leg and resembled menstrual bleeding. Pt denies any n/v, fever, chills, sob, cp, urinary changes or changes in bowel habits or lightheaded-ness however verbalizes weakness.

## 2019-05-03 NOTE — ED ADULT TRIAGE NOTE - CHIEF COMPLAINT QUOTE
pt presents to ED with c/o bright red blood from the "bottom area". pt and family unable to identify if its blood or stool

## 2019-05-04 VITALS
OXYGEN SATURATION: 99 % | DIASTOLIC BLOOD PRESSURE: 71 MMHG | HEART RATE: 81 BPM | SYSTOLIC BLOOD PRESSURE: 148 MMHG | RESPIRATION RATE: 16 BRPM

## 2019-05-04 NOTE — ED PROVIDER NOTE - PROGRESS NOTE DETAILS
bleeding appears to be coming from vagina.  no active bleeding in ED. pt stable. nl h/h. stable vitals. recommend pmd and gyn fu

## 2019-05-04 NOTE — ED PROVIDER NOTE - GENITOURINARY SPECULUM EXAM
speculum exam limited, difficult due to pt discomfort/pain: no active bleeding, small bluish lesion about 5mm partly visualized with small amt dried blood on it in vag wall.

## 2019-05-04 NOTE — ED PROVIDER NOTE - CLINICAL SUMMARY MEDICAL DECISION MAKING FREE TEXT BOX
bleeding during urination, unclear source.  exam showed likely source vaginal. check ua , ruled out uti. checked labs, CTabd/pelv, ruled out diverticulitis/colitis. bleeding inactive. hemodynamically stable. h/h normal . f/u c gyn

## 2019-05-04 NOTE — ED PROVIDER NOTE - GASTROINTESTINAL, MLM
Abdomen soft, non-tender, no guarding. Abdomen soft, nondistended. slight generalized lower abd ttp. brown stool. no gross blood on rectal exam

## 2019-05-04 NOTE — ED PROVIDER NOTE - PMH
Bladder dysfunction    Depression    Glaucoma    Hypotension Bladder dysfunction    Depression    Glaucoma    Hypotension    Malignant neoplasm of ovary, unspecified laterality

## 2019-05-04 NOTE — ED PROVIDER NOTE - CHIEF COMPLAINT
The patient is a 89y Female complaining of bloody stools. The patient is a 89y Female complaining of bloody urination

## 2019-05-06 RX ORDER — CEPHALEXIN 500 MG
500 CAPSULE ORAL
Qty: 20 | Refills: 0
Start: 2019-05-06 | End: 2019-05-15

## 2019-05-06 NOTE — ED POST DISCHARGE NOTE - DETAILS
spoke w pt's daughter Ruthann Zamora, sent Kelfex to her pharmacy, pt will make appt with GYN for vag bleeding

## 2019-06-27 ENCOUNTER — MEDICATION RENEWAL (OUTPATIENT)
Age: 84
End: 2019-06-27

## 2019-08-09 ENCOUNTER — EMERGENCY (EMERGENCY)
Facility: HOSPITAL | Age: 84
LOS: 1 days | Discharge: HOME | End: 2019-08-09
Attending: EMERGENCY MEDICINE | Admitting: EMERGENCY MEDICINE
Payer: COMMERCIAL

## 2019-08-09 VITALS
OXYGEN SATURATION: 95 % | TEMPERATURE: 98 F | HEART RATE: 97 BPM | SYSTOLIC BLOOD PRESSURE: 126 MMHG | WEIGHT: 115.08 LBS | HEIGHT: 65 IN | DIASTOLIC BLOOD PRESSURE: 83 MMHG | RESPIRATION RATE: 16 BRPM

## 2019-08-09 DIAGNOSIS — Z90.710 ACQUIRED ABSENCE OF BOTH CERVIX AND UTERUS: Chronic | ICD-10-CM

## 2019-08-09 PROBLEM — C56.9 MALIGNANT NEOPLASM OF UNSPECIFIED OVARY: Chronic | Status: ACTIVE | Noted: 2019-05-04

## 2019-08-09 PROCEDURE — 73660 X-RAY EXAM OF TOE(S): CPT | Mod: 26,RT

## 2019-08-09 PROCEDURE — 73660 X-RAY EXAM OF TOE(S): CPT

## 2019-08-09 PROCEDURE — 99283 EMERGENCY DEPT VISIT LOW MDM: CPT

## 2019-08-09 PROCEDURE — 99283 EMERGENCY DEPT VISIT LOW MDM: CPT | Mod: 25

## 2019-08-09 NOTE — ED PROVIDER NOTE - MUSCULOSKELETAL, MLM
soft tissue tenderness to right 2nd and 3rd digits. no deformity. full ROM soft tissue tenderness to right 2nd and 3rd digits. no deformity. full ROM. large overgrowth of right great toe (hooked shaped) and thickening due to onychomycosis

## 2019-08-09 NOTE — ED ADULT NURSE NOTE - OBJECTIVE STATEMENT
patient needs to removed a long toe nail that bothers her for a long time, patient is able to walk with a walker, family brought her to an urgent care center today and they recommended to send her ED, patient is aaox3, will continue to monitor.

## 2019-08-09 NOTE — ED ADULT NURSE NOTE - NSIMPLEMENTINTERV_GEN_ALL_ED
Implemented All Fall with Harm Risk Interventions:  [place] to call system. Call bell, personal items and telephone within reach. Instruct patient to call for assistance. Room bathroom lighting operational. Non-slip footwear when patient is off stretcher. Physically safe environment: no spills, clutter or unnecessary equipment. Stretcher in lowest position, wheels locked, appropriate side rails in place. Provide visual cue, wrist band, yellow gown, etc. Monitor gait and stability. Monitor for mental status changes and reorient to person, place, and time. Review medications for side effects contributing to fall risk. Reinforce activity limits and safety measures with patient and family. Provide visual clues: red socks.

## 2019-08-09 NOTE — ED PROVIDER NOTE - CARE PLAN
Principal Discharge DX:	Toe pain, right  Secondary Diagnosis:	Onychomycosis  Secondary Diagnosis:	Abrasion of toe of right foot, initial encounter

## 2019-08-09 NOTE — ED PROVIDER NOTE - OBJECTIVE STATEMENT
89 year old female with history of HTN, glaucoma, and history of ovarian cancer presents with overgrown right great toenail and pain to right 2nd and 3rd toes for the past few days. no known injury or trauma. pain mild in nature, worse when her toenail rubs on her 2nd and 3rd toes. no fevers, drainage, or redness. usually goes to podiatrist to have toenails clipped. Per son, hasn't been "for a while, at least 6-8 months".    PCP Helio Amor  Podiatrist Jayson Mello

## 2019-08-09 NOTE — ED PROVIDER NOTE - PROGRESS NOTE DETAILS
Scribe AS for Dr. Fernandez: 89 y/o female with a PMHx of Hypotension, Malignant Neoplasm of ovary, Glaucoma, Depression, s/p hysterectomy presents to the ED c/o increasing right foot pain x yesterday. Pt has a toenail growing from her right first toe growing and cutting her third toe on the same foot.  PE: +third toe tiny abrasion on the tip from the toe nail on the first toe. Toenail fingus on both great toes. Full ROM intact. Pulses and sensation are normal. Impression is overgrown toenail causing abrasions to other toe. Plan podiatry consult. toes bandaged to avoid right great toe from rubbing on 2nd and 3rd toes. x-ray results discussed which showed no acute fx.  An opportunity to ask questions was given.  Discussed the importance of prompt, close medical follow-up.  Patient will return with any changes, concerns or persistent / worsening symptoms.  Understanding of all instructions verbalized.  will have close follow up with podiatry

## 2019-08-09 NOTE — ED PROVIDER NOTE - CLINICAL SUMMARY MEDICAL DECISION MAKING FREE TEXT BOX
right 2nd and 3rd digit pain, suspect from irritation from right great toe rubbing on toes (large overgrowth and thickening from onychomycosis). has not seen podiatry in several months. do not have proper equipment to trim down toenails in ED. recommend close follow up with podiatry. has own podiatrist but will also provide referral. no evidence of cellulitis or abscess. will x-ray, bandage, and close podiatry follow up

## 2019-09-08 ENCOUNTER — RX RENEWAL (OUTPATIENT)
Age: 84
End: 2019-09-08

## 2019-09-30 ENCOUNTER — INPATIENT (INPATIENT)
Facility: HOSPITAL | Age: 84
LOS: 2 days | Discharge: HOME | DRG: 865 | End: 2019-10-03
Attending: INTERNAL MEDICINE | Admitting: INTERNAL MEDICINE
Payer: COMMERCIAL

## 2019-09-30 VITALS
SYSTOLIC BLOOD PRESSURE: 114 MMHG | HEIGHT: 65 IN | TEMPERATURE: 98 F | RESPIRATION RATE: 17 BRPM | DIASTOLIC BLOOD PRESSURE: 62 MMHG | WEIGHT: 100.09 LBS | HEART RATE: 68 BPM | OXYGEN SATURATION: 97 %

## 2019-09-30 DIAGNOSIS — J18.9 PNEUMONIA, UNSPECIFIED ORGANISM: ICD-10-CM

## 2019-09-30 DIAGNOSIS — Z90.710 ACQUIRED ABSENCE OF BOTH CERVIX AND UTERUS: Chronic | ICD-10-CM

## 2019-09-30 LAB
ALBUMIN SERPL ELPH-MCNC: 3.1 G/DL — LOW (ref 3.3–5)
ALP SERPL-CCNC: 73 U/L — SIGNIFICANT CHANGE UP (ref 40–120)
ALT FLD-CCNC: 8 U/L — LOW (ref 10–45)
ANION GAP SERPL CALC-SCNC: 7 MMOL/L — SIGNIFICANT CHANGE UP (ref 5–17)
APPEARANCE UR: CLEAR — SIGNIFICANT CHANGE UP
APTT BLD: 28.8 SEC — SIGNIFICANT CHANGE UP (ref 27.5–36.3)
AST SERPL-CCNC: 12 U/L — SIGNIFICANT CHANGE UP (ref 10–40)
BACTERIA # UR AUTO: ABNORMAL /HPF
BASOPHILS # BLD AUTO: 0.02 K/UL — SIGNIFICANT CHANGE UP (ref 0–0.2)
BASOPHILS NFR BLD AUTO: 0.2 % — SIGNIFICANT CHANGE UP (ref 0–2)
BILIRUB SERPL-MCNC: 1.1 MG/DL — SIGNIFICANT CHANGE UP (ref 0.2–1.2)
BILIRUB UR-MCNC: NEGATIVE — SIGNIFICANT CHANGE UP
BUN SERPL-MCNC: 30 MG/DL — HIGH (ref 7–23)
CALCIUM SERPL-MCNC: 11.2 MG/DL — HIGH (ref 8.4–10.5)
CHLORIDE SERPL-SCNC: 103 MMOL/L — SIGNIFICANT CHANGE UP (ref 96–108)
CO2 SERPL-SCNC: 32 MMOL/L — HIGH (ref 22–31)
COLOR SPEC: YELLOW — SIGNIFICANT CHANGE UP
CREAT SERPL-MCNC: 1.23 MG/DL — SIGNIFICANT CHANGE UP (ref 0.5–1.3)
DIFF PNL FLD: ABNORMAL
EOSINOPHIL # BLD AUTO: 0.05 K/UL — SIGNIFICANT CHANGE UP (ref 0–0.5)
EOSINOPHIL NFR BLD AUTO: 0.5 % — SIGNIFICANT CHANGE UP (ref 0–6)
EPI CELLS # UR: SIGNIFICANT CHANGE UP
GLUCOSE SERPL-MCNC: 101 MG/DL — HIGH (ref 70–99)
GLUCOSE UR QL: NEGATIVE — SIGNIFICANT CHANGE UP
HCT VFR BLD CALC: 38.4 % — SIGNIFICANT CHANGE UP (ref 34.5–45)
HGB BLD-MCNC: 12.2 G/DL — SIGNIFICANT CHANGE UP (ref 11.5–15.5)
IMM GRANULOCYTES NFR BLD AUTO: 0.4 % — SIGNIFICANT CHANGE UP (ref 0–1.5)
INR BLD: 1.1 RATIO — SIGNIFICANT CHANGE UP (ref 0.88–1.16)
KETONES UR-MCNC: NEGATIVE — SIGNIFICANT CHANGE UP
LACTATE SERPL-SCNC: 1.3 MMOL/L — SIGNIFICANT CHANGE UP (ref 0.7–2)
LEUKOCYTE ESTERASE UR-ACNC: ABNORMAL
LYMPHOCYTES # BLD AUTO: 1.41 K/UL — SIGNIFICANT CHANGE UP (ref 1–3.3)
LYMPHOCYTES # BLD AUTO: 14.7 % — SIGNIFICANT CHANGE UP (ref 13–44)
MCHC RBC-ENTMCNC: 31.7 PG — SIGNIFICANT CHANGE UP (ref 27–34)
MCHC RBC-ENTMCNC: 31.8 GM/DL — LOW (ref 32–36)
MCV RBC AUTO: 99.7 FL — SIGNIFICANT CHANGE UP (ref 80–100)
MONOCYTES # BLD AUTO: 1.68 K/UL — HIGH (ref 0–0.9)
MONOCYTES NFR BLD AUTO: 17.6 % — HIGH (ref 2–14)
NEUTROPHILS # BLD AUTO: 6.36 K/UL — SIGNIFICANT CHANGE UP (ref 1.8–7.4)
NEUTROPHILS NFR BLD AUTO: 66.6 % — SIGNIFICANT CHANGE UP (ref 43–77)
NITRITE UR-MCNC: POSITIVE
NRBC # BLD: 0 /100 WBCS — SIGNIFICANT CHANGE UP (ref 0–0)
PH UR: 5 — SIGNIFICANT CHANGE UP (ref 5–8)
PLATELET # BLD AUTO: 192 K/UL — SIGNIFICANT CHANGE UP (ref 150–400)
POTASSIUM SERPL-MCNC: 3.7 MMOL/L — SIGNIFICANT CHANGE UP (ref 3.5–5.3)
POTASSIUM SERPL-SCNC: 3.7 MMOL/L — SIGNIFICANT CHANGE UP (ref 3.5–5.3)
PROT SERPL-MCNC: 8.2 G/DL — SIGNIFICANT CHANGE UP (ref 6–8.3)
PROT UR-MCNC: 30 MG/DL
PROTHROM AB SERPL-ACNC: 12.4 SEC — SIGNIFICANT CHANGE UP (ref 10–12.9)
RBC # BLD: 3.85 M/UL — SIGNIFICANT CHANGE UP (ref 3.8–5.2)
RBC # FLD: 13.4 % — SIGNIFICANT CHANGE UP (ref 10.3–14.5)
RBC CASTS # UR COMP ASSIST: ABNORMAL /HPF (ref 0–4)
SODIUM SERPL-SCNC: 142 MMOL/L — SIGNIFICANT CHANGE UP (ref 135–145)
SP GR SPEC: 1.02 — SIGNIFICANT CHANGE UP (ref 1.01–1.02)
UROBILINOGEN FLD QL: 1
WBC # BLD: 9.56 K/UL — SIGNIFICANT CHANGE UP (ref 3.8–10.5)
WBC # FLD AUTO: 9.56 K/UL — SIGNIFICANT CHANGE UP (ref 3.8–10.5)
WBC UR QL: ABNORMAL /HPF (ref 0–5)

## 2019-09-30 PROCEDURE — 71045 X-RAY EXAM CHEST 1 VIEW: CPT | Mod: 26

## 2019-09-30 PROCEDURE — 99285 EMERGENCY DEPT VISIT HI MDM: CPT

## 2019-09-30 PROCEDURE — 99223 1ST HOSP IP/OBS HIGH 75: CPT

## 2019-09-30 PROCEDURE — 93010 ELECTROCARDIOGRAM REPORT: CPT

## 2019-09-30 RX ORDER — FLUDROCORTISONE ACETATE 0.1 MG/1
0.1 TABLET ORAL
Refills: 0 | Status: DISCONTINUED | OUTPATIENT
Start: 2019-09-30 | End: 2019-10-03

## 2019-09-30 RX ORDER — MIRTAZAPINE 45 MG/1
1 TABLET, ORALLY DISINTEGRATING ORAL
Qty: 0 | Refills: 0 | DISCHARGE

## 2019-09-30 RX ORDER — LATANOPROST 0.05 MG/ML
1 SOLUTION/ DROPS OPHTHALMIC; TOPICAL
Refills: 0 | Status: DISCONTINUED | OUTPATIENT
Start: 2019-09-30 | End: 2019-10-03

## 2019-09-30 RX ORDER — SENNA PLUS 8.6 MG/1
1 TABLET ORAL AT BEDTIME
Refills: 0 | Status: DISCONTINUED | OUTPATIENT
Start: 2019-09-30 | End: 2019-10-03

## 2019-09-30 RX ORDER — MIDODRINE HYDROCHLORIDE 2.5 MG/1
10 TABLET ORAL THREE TIMES A DAY
Refills: 0 | Status: DISCONTINUED | OUTPATIENT
Start: 2019-09-30 | End: 2019-10-03

## 2019-09-30 RX ORDER — ASPIRIN/CALCIUM CARB/MAGNESIUM 324 MG
81 TABLET ORAL DAILY
Refills: 0 | Status: DISCONTINUED | OUTPATIENT
Start: 2019-09-30 | End: 2019-10-03

## 2019-09-30 RX ORDER — TRAVOPROST 0.04 MG/ML
1 SOLUTION/ DROPS OPHTHALMIC
Qty: 0 | Refills: 0 | DISCHARGE

## 2019-09-30 RX ORDER — POLYETHYLENE GLYCOL 3350 17 G/17G
17 POWDER, FOR SOLUTION ORAL DAILY
Refills: 0 | Status: DISCONTINUED | OUTPATIENT
Start: 2019-09-30 | End: 2019-10-03

## 2019-09-30 RX ORDER — MIDODRINE HYDROCHLORIDE 2.5 MG/1
1 TABLET ORAL
Qty: 0 | Refills: 0 | DISCHARGE

## 2019-09-30 RX ORDER — FLUDROCORTISONE ACETATE 0.1 MG/1
0 TABLET ORAL
Qty: 0 | Refills: 0 | DISCHARGE

## 2019-09-30 RX ORDER — HEPARIN SODIUM 5000 [USP'U]/ML
5000 INJECTION INTRAVENOUS; SUBCUTANEOUS EVERY 8 HOURS
Refills: 0 | Status: DISCONTINUED | OUTPATIENT
Start: 2019-09-30 | End: 2019-10-03

## 2019-09-30 RX ORDER — SODIUM CHLORIDE 9 MG/ML
1400 INJECTION INTRAMUSCULAR; INTRAVENOUS; SUBCUTANEOUS ONCE
Refills: 0 | Status: COMPLETED | OUTPATIENT
Start: 2019-09-30 | End: 2019-09-30

## 2019-09-30 RX ORDER — TOLTERODINE TARTRATE 1 MG/1
0 TABLET, FILM COATED ORAL
Qty: 0 | Refills: 0 | DISCHARGE

## 2019-09-30 RX ORDER — ACETAMINOPHEN 500 MG
650 TABLET ORAL ONCE
Refills: 0 | Status: COMPLETED | OUTPATIENT
Start: 2019-09-30 | End: 2019-09-30

## 2019-09-30 RX ORDER — INFLUENZA VIRUS VACCINE 15; 15; 15; 15 UG/.5ML; UG/.5ML; UG/.5ML; UG/.5ML
0.5 SUSPENSION INTRAMUSCULAR ONCE
Refills: 0 | Status: DISCONTINUED | OUTPATIENT
Start: 2019-09-30 | End: 2019-10-03

## 2019-09-30 RX ORDER — DOCUSATE SODIUM 100 MG
100 CAPSULE ORAL
Refills: 0 | Status: DISCONTINUED | OUTPATIENT
Start: 2019-09-30 | End: 2019-10-03

## 2019-09-30 RX ADMIN — Medication 600 MILLIGRAM(S): at 22:38

## 2019-09-30 RX ADMIN — SENNA PLUS 1 TABLET(S): 8.6 TABLET ORAL at 22:36

## 2019-09-30 RX ADMIN — SODIUM CHLORIDE 1400 MILLILITER(S): 9 INJECTION INTRAMUSCULAR; INTRAVENOUS; SUBCUTANEOUS at 15:30

## 2019-09-30 RX ADMIN — Medication 650 MILLIGRAM(S): at 15:20

## 2019-09-30 RX ADMIN — SODIUM CHLORIDE 1400 MILLILITER(S): 9 INJECTION INTRAMUSCULAR; INTRAVENOUS; SUBCUTANEOUS at 14:20

## 2019-09-30 RX ADMIN — LATANOPROST 1 DROP(S): 0.05 SOLUTION/ DROPS OPHTHALMIC; TOPICAL at 22:32

## 2019-09-30 RX ADMIN — Medication 650 MILLIGRAM(S): at 14:20

## 2019-09-30 RX ADMIN — MIDODRINE HYDROCHLORIDE 10 MILLIGRAM(S): 2.5 TABLET ORAL at 23:15

## 2019-09-30 RX ADMIN — HEPARIN SODIUM 5000 UNIT(S): 5000 INJECTION INTRAVENOUS; SUBCUTANEOUS at 22:36

## 2019-09-30 NOTE — H&P ADULT - NSHPPHYSICALEXAM_GEN_ALL_CORE
GENERAL: NAD. AAO x2 baseline  HEAD:  Atraumatic, Normocephalic  EYES: EOMI, PERRLA, sclera clear  NECK: Supple. + Immobile lump on left supraclavicular area  CHEST/LUNG: + bilateral rhonchi  HEART: Regular rate and rhythm; No murmurs  ABDOMEN: Soft, Nontender, Nondistended; Bowel sounds present  EXTREMITIES:  no edema  NEUROLOGY: non-focal  SKIN: No rashes or lesions

## 2019-09-30 NOTE — H&P ADULT - NSHPLABSRESULTS_GEN_ALL_CORE
T(C): 36.3 (19 @ 19:05), Max: 38.6 (19 @ 13:42)  T(F): 97.4 (19 @ 19:05), Max: 101.4 (19 @ 13:42)  HR: 68 (19 @ 19:05) (68 - 78)  BP: 113/50 (19 @ 19:05) (113/50 - 126/43)  RR: 14 (19 @ 19:05) (14 - 22)  SpO2: 95% (19 @ 19:05) (95% - 99%)  Labs:                         12.2   9.56    )-----------(   192      ( 30 Sep 2019 14:10 )              38.4     Neutro%  66.6    Lympho%  14.7    Mono%    17.6<H>   Bands    x            142  |  103  |  30<H>  ----------------------------<  101<H>  3.7   |  32<H>  |  1.23    Ca    11.2<H>      30 Sep 2019 14:10    TPro  8.2  /  Alb  3.1<L>  /  TBili  1.1  /  DBili  x   /  AST  12  /  ALT  8<L>  /  AlkPhos  73      eGFR if Non African American: 39 mL/min/1.73M2 (19 @ 14:10)  eGFR if African American: 45 mL/min/1.73M2 (19 @ 14:10)      ( 30 Sep 2019 14:10 )   PT: 12.4 sec;   INR: 1.10 ratio;       PTT:28.8 sec        Urinalysis Basic - ( 30 Sep 2019 14:30 )    Color: Yellow / Appearance: Clear / S.020 / pH: x  Gluc: x / Ketone: Negative  / Bili: Negative / Urobili: 1   Blood: x / Protein: 30 mg/dL / Nitrite: Positive   Leuk Esterase: Moderate / RBC: 5-10 /HPF / WBC 6-10 /HPF   Sq Epi: x / Non Sq Epi: Neg.-Few / Bacteria: Many /HP

## 2019-09-30 NOTE — ED ADULT NURSE NOTE - PMH
Bladder dysfunction    Depression    Glaucoma    Hypotension    Malignant neoplasm of ovary, unspecified laterality

## 2019-09-30 NOTE — H&P ADULT - ASSESSMENT
89 year old female, PMHx of hypotension, h/o Ovarian ca s/p total hysterectomy with oophorectomy and omental resection, glaucoma, presents to the ED complaining of cough and generalized weakness x 5 days.     # Fever  # Productive cough and generalized weakness  - r/o viral infection r/o atypical PNA  - CXR negative   - send RVP  - follow up blood cx  - cont Levaquin 500mg q24 for now  - Mucinex     # UTI  - cont Levaquin   - follow up Urine cx    # Hypercalcemia. Corrected calcium 11.9  - h/o ovarian ca. r/o metastasis to bone  - pt admits to occasional back pain, but not now.  - consider bone survey  - check intact PTH, if iPTH low-normal or low: check PTHrP, 1, 25 - vit D, 25- vit D, SPEP, UPEP, serum free light chain  - follow up Renal    # BRETT on CKD 3  - baseline Cr 0.92  - s/p IVF  - cont to monitor    # Hypotension  - c/w fludrocortisone and midodrine    # DVT ppx: heparin    IMPROVE VTE Individual Risk Assessment    RISK                                                                Points    [  ] Previous VTE                                                  3    [  ] Thrombophilia                                               2    [  ] Lower limb paralysis                                      2        (unable to hold up >15 seconds)      [  ] Current Cancer                                              2         (within 6 months)    [  ] Immobilization > 24 hrs                                1    [  ] ICU/CCU stay > 24 hours                              1    [ X ] Age > 60                                                      1    IMPROVE VTE Score _____1____    IMPROVE Score 0-1: Low Risk, No VTE prophylaxis required for most patients, encourage ambulation.   IMPROVE Score 2-3: At risk, pharmacologic VTE prophylaxis is indicated for most patients (in the absence of a contraindication)  IMPROVE Score > or = 4: High Risk, pharmacologic VTE prophylaxis is indicated for most patients (in the absence of a contraindication) 89 year old female, PMHx of hypotension, h/o Ovarian ca s/p total hysterectomy with oophorectomy and omental resection, glaucoma, presents to the ED complaining of cough and generalized weakness x 5 days.     # Fever  # Productive cough and generalized weakness  - r/o viral infection r/o atypical PNA  - CXR negative   - send RVP  - follow up blood cx  - cont Levaquin 500mg q24 for now  - Mucinex     # UTI  - cont Levaquin   - follow up Urine cx    # Hypercalcemia. Corrected calcium 11.9  - h/o ovarian ca. r/o metastasis to bone  - pt admits to occasional back pain, but not now.  - consider bone survey  - check intact PTH, if iPTH low-normal or low: check PTHrP, 1, 25 - vit D, 25- vit D, SPEP, UPEP, serum free light chain  - follow up Renal    # BRETT on CKD 3  - baseline Cr 0.92  - s/p IVF  - cont to monitor    # Lump on left supraclavicular area  - US neck     # Hypotension  - c/w fludrocortisone and midodrine    # DVT ppx: heparin    IMPROVE VTE Individual Risk Assessment    RISK                                                                Points    [  ] Previous VTE                                                  3    [  ] Thrombophilia                                               2    [  ] Lower limb paralysis                                      2        (unable to hold up >15 seconds)      [  ] Current Cancer                                              2         (within 6 months)    [  ] Immobilization > 24 hrs                                1    [  ] ICU/CCU stay > 24 hours                              1    [ X ] Age > 60                                                      1    IMPROVE VTE Score _____1____    IMPROVE Score 0-1: Low Risk, No VTE prophylaxis required for most patients, encourage ambulation.   IMPROVE Score 2-3: At risk, pharmacologic VTE prophylaxis is indicated for most patients (in the absence of a contraindication)  IMPROVE Score > or = 4: High Risk, pharmacologic VTE prophylaxis is indicated for most patients (in the absence of a contraindication)

## 2019-09-30 NOTE — H&P ADULT - HISTORY OF PRESENT ILLNESS
89 year old female, PMHx of hypotension, h/o Ovarian ca s/p total hysterectomy with oophorectomy and omental resection, glaucoma, presents to the ED complaining of cough and generalized weakness x 5 days. Son states for the last five days his mother has had chest congestion, accompanied by productive cough with clear sputum. Today, pt appears having worsening generalized weakness, prompting him to bring her to the ED for evaluation. + sick contact, a cold going around in the family. No fevers at home. No chest pain, swelling, abd pain, n/v/d, or other complaints. No dysuria.    T max 101.4 HR 78 /62 RR 20 O2 sat: 97% RA

## 2019-09-30 NOTE — ED ADULT NURSE NOTE - OBJECTIVE STATEMENT
Pt presents 4 days of coughing up phlegm according to son.  Pt given robitussin for cough by her son.  Pt is alert and oriented to name, day of week, confused to year and president.

## 2019-09-30 NOTE — ED PROVIDER NOTE - CLINICAL SUMMARY MEDICAL DECISION MAKING FREE TEXT BOX
89 yo F pw cough, chest congestion, febrile- concern for pna- will start fluids as per sepsis protocol, labs, cultures, cxr, ua, abx 89 year old female, PMHx of hypotension, glaucoma, presents to the ED complaining of cough x 5 days. Son states for the last five days his mother has had chest congestion, accompanied by cough. Today, he notes she appeared more weak (generally), than normal, prompting him to bring her to the ED for evaluation. States pt has been eating/drinking normally, no known fevers at home. No chest pain, swelling, abd pain, n/v/d, or other complaints.  89 yo F pw cough, chest congestion, febrile- concern for pna- will start fluids as per sepsis protocol, labs, cultures, cxr, ua, abx

## 2019-09-30 NOTE — H&P ADULT - NSICDXPASTMEDICALHX_GEN_ALL_CORE_FT
PAST MEDICAL HISTORY:  Bladder dysfunction     Depression     Glaucoma     Hypotension     Malignant neoplasm of ovary, unspecified laterality

## 2019-09-30 NOTE — ED PROVIDER NOTE - ATTENDING CONTRIBUTION TO CARE
Beka with [facility]. 89 year old female, PMHx of hypotension, glaucoma, presents to the ED complaining of cough x 5 days. Son states for the last five days his mother has had chest congestion, accompanied by cough. Today, he notes she appeared more weak (generally), than normal, prompting him to bring her to the ED for evaluation. States pt has been eating/drinking normally, no known fevers at home. No chest pain, swelling, abd pain, n/v/d, or other complaints.  89 yo F pw cough, chest congestion, febrile- concern for pna- will start fluids as per sepsis protocol, labs, cultures, cxr, ua, abx  Pt without initial signs of sepsis. +fever. other vitals wnl. WBC and lactate level nml. I performed a face to face bedside interview with patient regarding history of present illness, review of symptoms and past medical history. I completed an independent physical exam.  I have discussed the patient's plan of care with Physician Assistant (PA). I agree with note as stated above, having amended the EMR as needed to reflect my findings.   This includes History of Present Illness, HIV, Past Medical/Surgical/Family/Social History, Allergies and Home Medications, Review of Systems, Physical Exam, and any Progress Notes during the time I functioned as the attending physician for this patient.

## 2019-09-30 NOTE — ED PROVIDER NOTE - OBJECTIVE STATEMENT
89 year old female, PMHx of hypotension, glaucoma, presents to the ED complaining of cough x 5 days. Son states for the last five days his mother has had chest congestion, accompanied by cough. Today, he notes she appeared more weak (generally), than normal, prompting him to bring her to the ED for evaluation. States pt has been eating/drinking normally, no known fevers at home. No chest pain, swelling, abd pain, n/v/d, or other complaints.

## 2019-10-01 DIAGNOSIS — R50.9 FEVER, UNSPECIFIED: ICD-10-CM

## 2019-10-01 LAB
ALBUMIN SERPL ELPH-MCNC: 2.7 G/DL — LOW (ref 3.3–5)
ALP SERPL-CCNC: 67 U/L — SIGNIFICANT CHANGE UP (ref 40–120)
ALT FLD-CCNC: 6 U/L — LOW (ref 10–45)
ANION GAP SERPL CALC-SCNC: 7 MMOL/L — SIGNIFICANT CHANGE UP (ref 5–17)
APPEARANCE UR: CLEAR — SIGNIFICANT CHANGE UP
AST SERPL-CCNC: 14 U/L — SIGNIFICANT CHANGE UP (ref 10–40)
BACTERIA # UR AUTO: ABNORMAL /HPF
BILIRUB SERPL-MCNC: 0.9 MG/DL — SIGNIFICANT CHANGE UP (ref 0.2–1.2)
BILIRUB UR-MCNC: NEGATIVE — SIGNIFICANT CHANGE UP
BUN SERPL-MCNC: 23 MG/DL — SIGNIFICANT CHANGE UP (ref 7–23)
CALCIUM SERPL-MCNC: 10.6 MG/DL — HIGH (ref 8.4–10.5)
CALCIUM SERPL-MCNC: 10.7 MG/DL — HIGH (ref 8.4–10.5)
CHLORIDE SERPL-SCNC: 106 MMOL/L — SIGNIFICANT CHANGE UP (ref 96–108)
CO2 SERPL-SCNC: 30 MMOL/L — SIGNIFICANT CHANGE UP (ref 22–31)
COLOR SPEC: YELLOW — SIGNIFICANT CHANGE UP
CREAT SERPL-MCNC: 0.95 MG/DL — SIGNIFICANT CHANGE UP (ref 0.5–1.3)
DIFF PNL FLD: ABNORMAL
EPI CELLS # UR: SIGNIFICANT CHANGE UP
GLUCOSE SERPL-MCNC: 86 MG/DL — SIGNIFICANT CHANGE UP (ref 70–99)
GLUCOSE UR QL: NEGATIVE — SIGNIFICANT CHANGE UP
KETONES UR-MCNC: NEGATIVE — SIGNIFICANT CHANGE UP
LEUKOCYTE ESTERASE UR-ACNC: ABNORMAL
MAGNESIUM SERPL-MCNC: 2 MG/DL — SIGNIFICANT CHANGE UP (ref 1.6–2.6)
NITRITE UR-MCNC: NEGATIVE — SIGNIFICANT CHANGE UP
PH UR: 5 — SIGNIFICANT CHANGE UP (ref 5–8)
POTASSIUM SERPL-MCNC: 4.1 MMOL/L — SIGNIFICANT CHANGE UP (ref 3.5–5.3)
POTASSIUM SERPL-SCNC: 4.1 MMOL/L — SIGNIFICANT CHANGE UP (ref 3.5–5.3)
PROT SERPL-MCNC: 7.4 G/DL — SIGNIFICANT CHANGE UP (ref 6–8.3)
PROT UR-MCNC: 30 MG/DL
PTH-INTACT FLD-MCNC: 60 PG/ML — SIGNIFICANT CHANGE UP (ref 15–65)
RAPID RVP RESULT: DETECTED
RBC CASTS # UR COMP ASSIST: ABNORMAL /HPF (ref 0–4)
RV+EV RNA SPEC QL NAA+PROBE: DETECTED
SODIUM SERPL-SCNC: 143 MMOL/L — SIGNIFICANT CHANGE UP (ref 135–145)
SP GR SPEC: 1.02 — SIGNIFICANT CHANGE UP (ref 1.01–1.02)
UROBILINOGEN FLD QL: NEGATIVE — SIGNIFICANT CHANGE UP
WBC UR QL: SIGNIFICANT CHANGE UP /HPF (ref 0–5)

## 2019-10-01 PROCEDURE — 99235 HOSP IP/OBS SAME DATE MOD 70: CPT

## 2019-10-01 PROCEDURE — 76536 US EXAM OF HEAD AND NECK: CPT | Mod: 26

## 2019-10-01 RX ORDER — CEFTRIAXONE 500 MG/1
1000 INJECTION, POWDER, FOR SOLUTION INTRAMUSCULAR; INTRAVENOUS EVERY 24 HOURS
Refills: 0 | Status: DISCONTINUED | OUTPATIENT
Start: 2019-10-01 | End: 2019-10-03

## 2019-10-01 RX ADMIN — LATANOPROST 1 DROP(S): 0.05 SOLUTION/ DROPS OPHTHALMIC; TOPICAL at 22:56

## 2019-10-01 RX ADMIN — FLUDROCORTISONE ACETATE 0.1 MILLIGRAM(S): 0.1 TABLET ORAL at 18:46

## 2019-10-01 RX ADMIN — MIDODRINE HYDROCHLORIDE 10 MILLIGRAM(S): 2.5 TABLET ORAL at 12:48

## 2019-10-01 RX ADMIN — CEFTRIAXONE 100 MILLIGRAM(S): 500 INJECTION, POWDER, FOR SOLUTION INTRAMUSCULAR; INTRAVENOUS at 12:48

## 2019-10-01 RX ADMIN — MIDODRINE HYDROCHLORIDE 10 MILLIGRAM(S): 2.5 TABLET ORAL at 17:29

## 2019-10-01 RX ADMIN — POLYETHYLENE GLYCOL 3350 17 GRAM(S): 17 POWDER, FOR SOLUTION ORAL at 12:48

## 2019-10-01 RX ADMIN — Medication 81 MILLIGRAM(S): at 13:19

## 2019-10-01 RX ADMIN — HEPARIN SODIUM 5000 UNIT(S): 5000 INJECTION INTRAVENOUS; SUBCUTANEOUS at 13:23

## 2019-10-01 RX ADMIN — Medication 600 MILLIGRAM(S): at 06:06

## 2019-10-01 RX ADMIN — HEPARIN SODIUM 5000 UNIT(S): 5000 INJECTION INTRAVENOUS; SUBCUTANEOUS at 22:56

## 2019-10-01 RX ADMIN — Medication 600 MILLIGRAM(S): at 18:45

## 2019-10-01 RX ADMIN — SENNA PLUS 1 TABLET(S): 8.6 TABLET ORAL at 23:02

## 2019-10-01 RX ADMIN — Medication 100 MILLIGRAM(S): at 18:46

## 2019-10-01 RX ADMIN — Medication 100 MILLIGRAM(S): at 18:45

## 2019-10-01 RX ADMIN — Medication 1 TABLET(S): at 12:48

## 2019-10-01 RX ADMIN — Medication 100 MILLIGRAM(S): at 06:06

## 2019-10-01 RX ADMIN — FLUDROCORTISONE ACETATE 0.1 MILLIGRAM(S): 0.1 TABLET ORAL at 06:07

## 2019-10-01 RX ADMIN — HEPARIN SODIUM 5000 UNIT(S): 5000 INJECTION INTRAVENOUS; SUBCUTANEOUS at 06:05

## 2019-10-01 RX ADMIN — MIDODRINE HYDROCHLORIDE 10 MILLIGRAM(S): 2.5 TABLET ORAL at 06:06

## 2019-10-01 NOTE — PROGRESS NOTE ADULT - PROBLEM SELECTOR PLAN 1
Continue Levaquin for now check urine and blood cultures respiratory viral panel and infectious disease comment

## 2019-10-01 NOTE — PHYSICAL THERAPY INITIAL EVALUATION ADULT - ADDITIONAL COMMENTS
Pt lives in house with daughter.  Pt states she is minimally ambulatory, uses quad cane.  Requires assist with ADLs.

## 2019-10-01 NOTE — PROGRESS NOTE ADULT - SUBJECTIVE AND OBJECTIVE BOX
Patient is a 89y old  Female who presents with a chief complaint of cough and generalized weakness (30 Sep 2019 20:11)      INTERVAL HPI/OVERNIGHT EVENTS: Resting comfortably      REVIEW OF SYSTEMS:  CONSTITUTIONAL: No fever, weight loss, or fatigue  EYES: No eye pain, visual disturbances, or discharge  ENMT:  No difficulty hearing, tinnitus, vertigo; No sinus or throat pain  NECK: No pain or stiffness  BREASTS: No pain, masses, or nipple discharge  RESPIRATORY: No cough, wheezing, chills or hemoptysis; No shortness of breath  CARDIOVASCULAR: No chest pain, palpitations, dizziness, or leg swelling  GASTROINTESTINAL: No abdominal or epigastric pain. No nausea, vomiting, or hematemesis; No diarrhea or constipation. No melena or hematochezia.  GENITOURINARY: No dysuria, frequency, hematuria, or incontinence  NEUROLOGICAL: No headaches, memory loss, loss of strength, numbness, or tremors  SKIN: No itching, burning, rashes, or lesions   LYMPH NODES: No enlarged glands  ENDOCRINE: No heat or cold intolerance; No hair loss  MUSCULOSKELETAL: No joint pain or swelling; No muscle, back, or extremity pain  PSYCHIATRIC: No depression, anxiety, mood swings, or difficulty sleeping  HEME/LYMPH: No easy bruising, or bleeding gums  ALLERY AND IMMUNOLOGIC: No hives or eczema  FAMILY HISTORY:  FH: prostate cancer: father    T(C): 36.4 (10-01-19 @ 06:01), Max: 38.6 (09-30-19 @ 13:42)  HR: 55 (10-01-19 @ 06:01) (55 - 78)  BP: 131/63 (10-01-19 @ 06:01) (113/50 - 131/63)  RR: 14 (10-01-19 @ 06:01) (14 - 22)  SpO2: 95% (10-01-19 @ 06:01) (95% - 99%)  Wt(kg): --Vital Signs Last 24 Hrs  T(C): 36.4 (01 Oct 2019 06:01), Max: 38.6 (30 Sep 2019 13:42)  T(F): 97.5 (01 Oct 2019 06:01), Max: 101.4 (30 Sep 2019 13:42)  HR: 55 (01 Oct 2019 06:01) (55 - 78)  BP: 131/63 (01 Oct 2019 06:01) (113/50 - 131/63)  BP(mean): --  RR: 14 (01 Oct 2019 06:01) (14 - 22)  SpO2: 95% (01 Oct 2019 06:01) (95% - 99%)    T(F): 97.5 (10-01-19 @ 06:01), Max: 101.4 (09-30-19 @ 13:42)  HR: 55 (10-01-19 @ 06:01) (55 - 78)  BP: 131/63 (10-01-19 @ 06:01) (113/50 - 131/63)  RR: 14 (10-01-19 @ 06:01) (14 - 22)  SpO2: 95% (10-01-19 @ 06:01) (95% - 99%)    PHYSICAL EXAM:  GENERAL: NAD, well-groomed, well-developed  HEAD:  Atraumatic, Normocephalic  EYES: EOMI, PERRLA, conjunctiva and sclera clear  ENMT: No tonsillar erythema, exudates, or enlargement; Moist mucous membranes, Good dentition, No lesions  NECK: Supple, No JVD, Normal thyroid  NERVOUS SYSTEM:  Alert & Oriented X3, Good concentration; Motor Strength 5/5 B/L upper and lower extremities; DTRs 2+ intact and symmetric  CHEST/LUNG: Clear to percussion bilaterally; No rales, rhonchi, wheezing, or rubs  HEART: Regular rate and rhythm; No murmurs, rubs, or gallops  ABDOMEN: Soft, Nontender, Nondistended; Bowel sounds present  EXTREMITIES:  2+ Peripheral Pulses, No clubbing, cyanosis, or edema  LYMPH: No lymphadenopathy noted  SKIN: No rashes or lesions    Consultant(s) Notes Reviewed:  [x ] YES  [ ] NO  Care Discussed with Consultants/Other Providers [ x] YES  [ ] NO    LABS:  10-01    143  |  106  |  23  ----------------------------<  86  4.1   |  30  |  0.95    Ca    10.6<H>      01 Oct 2019 07:35  Mg     2.0     10-01    TPro  7.4  /  Alb  2.7<L>  /  TBili  0.9  /  DBili  x   /  AST  14  /  ALT  6<L>  /  AlkPhos  67  10-01                          12.2   9.56  )-----------( 192      ( 30 Sep 2019 14:10 )             38.4         PT/INR - ( 30 Sep 2019 14:10 )   PT: 12.4 sec;   INR: 1.10 ratio         PTT - ( 30 Sep 2019 14:10 )  PTT:28.8 sec  LIVER FUNCTIONS - ( 01 Oct 2019 07:35 )  Alb: 2.7 g/dL / Pro: 7.4 g/dL / ALK PHOS: 67 U/L / ALT: 6 U/L / AST: 14 U/L / GGT: x                            12.2   9.56  )-----------( 192      ( 09-30 @ 14:10 )             38.4               RADIOLOGY & ADDITIONAL TESTS:    Imaging Personally Reviewed:  [ ] YES  [ ] NO  aspirin  chewable 81 milliGRAM(s) Oral daily  docusate sodium 100 milliGRAM(s) Oral two times a day  fludroCORTISONE 0.1 milliGRAM(s) Oral two times a day  guaiFENesin  milliGRAM(s) Oral every 12 hours  heparin  Injectable 5000 Unit(s) SubCutaneous every 8 hours  influenza   Vaccine 0.5 milliLiter(s) IntraMuscular once  latanoprost 0.005% Ophthalmic Solution 1 Drop(s) Both EYES <User Schedule>  levoFLOXacin IVPB 500 milliGRAM(s) IV Intermittent every 24 hours  midodrine. 10 milliGRAM(s) Oral three times a day  multivitamin 1 Tablet(s) Oral daily  polyethylene glycol 3350 17 Gram(s) Oral daily  senna 1 Tablet(s) Oral at bedtime      HEALTH ISSUES - PROBLEM Dx:

## 2019-10-01 NOTE — CONSULT NOTE ADULT - ASSESSMENT
89 yo female with history of dementia, HTN,hysterectomy for ovarian cancer and left hip replacement admitted with a fever and failure to thrive.  She was treated for pneumonia 1 year ago.CXR negative pneumonia is possible although her wbc is normal unlike 1 year ago when she had a leukocytosis of 15,000.  I would continue antibiotics pending cultures.This may be viral.I am concerned about empiric quinolones with there black box warnings.  Suggest:  1.Await blood and urine culture and RVP  2.Will substitute CTX and doxy for levaquin  3.PC level in the AM  4.Additional w/u pending clinical course 89 yo female with history of dementia, HTN,hysterectomy for ovarian cancer and left hip replacement admitted with a fever and failure to thrive.  She was treated for pneumonia 1 year ago.CXR negative pneumonia is possible although her wbc is normal unlike 1 year ago when she had a leukocytosis of 15,000.  I would continue antibiotics pending cultures.This may be viral.I am concerned about empiric quinolones with there black box warnings.History limited by dementia, labs notable for hypercalcemia, being evaluated.  Suggest:  1.Await blood and urine culture and RVP  2.Will substitute CTX and doxy for levaquin  3.PC level in the AM  4.Additional w/u pending clinical course

## 2019-10-01 NOTE — CHART NOTE - NSCHARTNOTEFT_GEN_A_CORE
Reviewed prior progress notes, labs and imaging.    Discussed with Dr. Bishop    Primary Diagnosis: uti,  possible viral syndrome, hypercalcemia, deshaun      Plan: levaquin, follow up rvp, blood cultures, ivf        Anticipated Discharge: 48 hours

## 2019-10-01 NOTE — PROGRESS NOTE ADULT - ASSESSMENT
Currently with a slight cough afebrile normal white count and lactate respiratory viral panel pending repeat calcium 10.6 with hydration parathyroid hormone pending patient has no symptoms of bone or joint discomfort continue Levaquin for now urinalysis showing mostly blood and bacteria with minimal leukocytes will await final culture and infectious disease opinion physical therapy ordered

## 2019-10-01 NOTE — CONSULT NOTE ADULT - SUBJECTIVE AND OBJECTIVE BOX
HPI:   Patient is a 89y female with a past history of dementia, ovarian cancer, HTN,and pneumonia 1 year ago who was admitted to [facility] on  with 5 days of fatigue, failure to thrive, and dry cough.She was febrile to 101.4 in ER, started on IV levaquin.She was hospitalized 1 year ago and treated for CTX and doxy for lower lobe pneumonia.She had a leukocytosis and elevated PC level with that admission.She is a poor historian, she denies and abdominal pain or dysuria.She has a dry cough.No one ill at home.No recent travel as per patient.    REVIEW OF SYSTEMS:  All other review of systems negative (Comprehensive ROS)    PAST MEDICAL & SURGICAL HISTORY:  Malignant neoplasm of ovary, unspecified laterality  Bladder dysfunction  Glaucoma  Depression  Hypotension  H/O: hysterectomy  Left hip arthoplasty    Allergies    No Known Allergies    Intolerances        Antimicrobials Day #  :day 2  levoFLOXacin IVPB 500 milliGRAM(s) IV Intermittent every 24 hours    Other Medications:  aspirin  chewable 81 milliGRAM(s) Oral daily  docusate sodium 100 milliGRAM(s) Oral two times a day  fludroCORTISONE 0.1 milliGRAM(s) Oral two times a day  guaiFENesin  milliGRAM(s) Oral every 12 hours  heparin  Injectable 5000 Unit(s) SubCutaneous every 8 hours  influenza   Vaccine 0.5 milliLiter(s) IntraMuscular once  latanoprost 0.005% Ophthalmic Solution 1 Drop(s) Both EYES <User Schedule>  midodrine. 10 milliGRAM(s) Oral three times a day  multivitamin 1 Tablet(s) Oral daily  polyethylene glycol 3350 17 Gram(s) Oral daily  senna 1 Tablet(s) Oral at bedtime      FAMILY HISTORY:  FH: prostate cancer: father      SOCIAL HISTORY:  Smoking: x    ETOH:   x  Drug Use: x    Single     T(F): 97.5 (10-01-19 @ 06:01), Max: 101.4 (19 @ 13:42)  HR: 55 (10-01-19 @ 06:01)  BP: 131/63 (10-01-19 @ 06:01)  RR: 14 (10-01-19 @ 06:01)  SpO2: 95% (10-01-19 @ 06:01)  Wt(kg): --    PHYSICAL EXAM:  General: alert, no acute distress  Eyes:  anicteric, no conjunctival injection, no discharge  Oropharynx: no lesions or injection, poor dentition 	  Neck: supple, without adenopathy  Lungs:  few ronchi  Heart: regular rate and rhythm; no murmur, rubs or gallops  Abdomen: soft, nondistended, nontender, without mass or organomegaly  Skin: no lesions  Extremities: no clubbing, cyanosis, or edema  Neurologic: alert, oriented, moves all extremities    LAB RESULTS:                        12.2   9.56  )-----------( 192      ( 30 Sep 2019 14:10 )             38.4     10    143  |  106  |  23  ----------------------------<  86  4.1   |  30  |  0.95    Ca    10.6<H>      01 Oct 2019 07:35  Mg     2.0     10-01    TPro  7.4  /  Alb  2.7<L>  /  TBili  0.9  /  DBili  x   /  AST  14  /  ALT  6<L>  /  AlkPhos  67  10-01    LIVER FUNCTIONS - ( 01 Oct 2019 07:35 )  Alb: 2.7 g/dL / Pro: 7.4 g/dL / ALK PHOS: 67 U/L / ALT: 6 U/L / AST: 14 U/L / GGT: x           Urinalysis Basic - ( 30 Sep 2019 14:30 )    Color: Yellow / Appearance: Clear / S.020 / pH: x  Gluc: x / Ketone: Negative  / Bili: Negative / Urobili: 1   Blood: x / Protein: 30 mg/dL / Nitrite: Positive   Leuk Esterase: Moderate / RBC: 5-10 /HPF / WBC 6-10 /HPF   Sq Epi: x / Non Sq Epi: Neg.-Few / Bacteria: Many /HPF        MICROBIOLOGY:  RECENT CULTURES:  RVP pending  Blood cultures are pending      RADIOLOGY REVIEWED:  < from: Xray Chest 1 View AP/PA (19 @ 14:51) >  EXAM:  XR CHEST AP OR PA 1V      PROCEDURE DATE:  2019        INTERPRETATION:  Portable chest radiograph        CLINICAL INFORMATION: Sepsis. Assess for pneumonia.    TECHNIQUE:  Portable  AP view of the chest was obtained.    COMPARISON: 2018 available for review.    FINDINGS:    The lungs  are clear.  No pleural abnormality is seen.    Heart size within normal limits allowing for magnification effect of AP   projection. . There is a mild DEXA scoliosis of the thoracic spine.   Chronic unfused of LEFT humeral neck fracture deformity noted.        IMPRESSION:   No evidence of active chest disease.

## 2019-10-02 LAB
-  AMIKACIN: SIGNIFICANT CHANGE UP
-  AMPICILLIN/SULBACTAM: SIGNIFICANT CHANGE UP
-  AMPICILLIN: SIGNIFICANT CHANGE UP
-  AZTREONAM: SIGNIFICANT CHANGE UP
-  CEFAZOLIN: SIGNIFICANT CHANGE UP
-  CEFEPIME: SIGNIFICANT CHANGE UP
-  CEFOXITIN: SIGNIFICANT CHANGE UP
-  CEFTRIAXONE: SIGNIFICANT CHANGE UP
-  CIPROFLOXACIN: SIGNIFICANT CHANGE UP
-  GENTAMICIN: SIGNIFICANT CHANGE UP
-  IMIPENEM: SIGNIFICANT CHANGE UP
-  LEVOFLOXACIN: SIGNIFICANT CHANGE UP
-  MEROPENEM: SIGNIFICANT CHANGE UP
-  NITROFURANTOIN: SIGNIFICANT CHANGE UP
-  PIPERACILLIN/TAZOBACTAM: SIGNIFICANT CHANGE UP
-  TIGECYCLINE: SIGNIFICANT CHANGE UP
-  TOBRAMYCIN: SIGNIFICANT CHANGE UP
-  TRIMETHOPRIM/SULFAMETHOXAZOLE: SIGNIFICANT CHANGE UP
ALBUMIN SERPL ELPH-MCNC: 2.5 G/DL — LOW (ref 3.3–5)
ALP SERPL-CCNC: 65 U/L — SIGNIFICANT CHANGE UP (ref 40–120)
ALT FLD-CCNC: <6 U/L — LOW (ref 10–45)
ANION GAP SERPL CALC-SCNC: 8 MMOL/L — SIGNIFICANT CHANGE UP (ref 5–17)
AST SERPL-CCNC: 13 U/L — SIGNIFICANT CHANGE UP (ref 10–40)
BILIRUB SERPL-MCNC: 0.5 MG/DL — SIGNIFICANT CHANGE UP (ref 0.2–1.2)
BUN SERPL-MCNC: 21 MG/DL — SIGNIFICANT CHANGE UP (ref 7–23)
CALCIUM SERPL-MCNC: 10.3 MG/DL — SIGNIFICANT CHANGE UP (ref 8.4–10.5)
CHLORIDE SERPL-SCNC: 107 MMOL/L — SIGNIFICANT CHANGE UP (ref 96–108)
CO2 SERPL-SCNC: 29 MMOL/L — SIGNIFICANT CHANGE UP (ref 22–31)
CREAT SERPL-MCNC: 0.86 MG/DL — SIGNIFICANT CHANGE UP (ref 0.5–1.3)
CULTURE RESULTS: SIGNIFICANT CHANGE UP
GLUCOSE SERPL-MCNC: 87 MG/DL — SIGNIFICANT CHANGE UP (ref 70–99)
METHOD TYPE: SIGNIFICANT CHANGE UP
ORGANISM # SPEC MICROSCOPIC CNT: SIGNIFICANT CHANGE UP
ORGANISM # SPEC MICROSCOPIC CNT: SIGNIFICANT CHANGE UP
POTASSIUM SERPL-MCNC: 3.5 MMOL/L — SIGNIFICANT CHANGE UP (ref 3.5–5.3)
POTASSIUM SERPL-SCNC: 3.5 MMOL/L — SIGNIFICANT CHANGE UP (ref 3.5–5.3)
PROCALCITONIN SERPL-MCNC: 0.18 NG/ML — HIGH
PROT SERPL-MCNC: 6.9 G/DL — SIGNIFICANT CHANGE UP (ref 6–8.3)
SODIUM SERPL-SCNC: 144 MMOL/L — SIGNIFICANT CHANGE UP (ref 135–145)
SPECIMEN SOURCE: SIGNIFICANT CHANGE UP

## 2019-10-02 PROCEDURE — 99233 SBSQ HOSP IP/OBS HIGH 50: CPT

## 2019-10-02 RX ADMIN — FLUDROCORTISONE ACETATE 0.1 MILLIGRAM(S): 0.1 TABLET ORAL at 17:37

## 2019-10-02 RX ADMIN — Medication 100 MILLIGRAM(S): at 17:37

## 2019-10-02 RX ADMIN — Medication 600 MILLIGRAM(S): at 06:57

## 2019-10-02 RX ADMIN — POLYETHYLENE GLYCOL 3350 17 GRAM(S): 17 POWDER, FOR SOLUTION ORAL at 11:13

## 2019-10-02 RX ADMIN — Medication 1 TABLET(S): at 11:14

## 2019-10-02 RX ADMIN — Medication 100 MILLIGRAM(S): at 06:56

## 2019-10-02 RX ADMIN — Medication 600 MILLIGRAM(S): at 17:36

## 2019-10-02 RX ADMIN — SENNA PLUS 1 TABLET(S): 8.6 TABLET ORAL at 21:44

## 2019-10-02 RX ADMIN — HEPARIN SODIUM 5000 UNIT(S): 5000 INJECTION INTRAVENOUS; SUBCUTANEOUS at 13:37

## 2019-10-02 RX ADMIN — Medication 81 MILLIGRAM(S): at 11:14

## 2019-10-02 RX ADMIN — MIDODRINE HYDROCHLORIDE 10 MILLIGRAM(S): 2.5 TABLET ORAL at 06:57

## 2019-10-02 RX ADMIN — HEPARIN SODIUM 5000 UNIT(S): 5000 INJECTION INTRAVENOUS; SUBCUTANEOUS at 06:57

## 2019-10-02 RX ADMIN — MIDODRINE HYDROCHLORIDE 10 MILLIGRAM(S): 2.5 TABLET ORAL at 17:36

## 2019-10-02 RX ADMIN — LATANOPROST 1 DROP(S): 0.05 SOLUTION/ DROPS OPHTHALMIC; TOPICAL at 21:44

## 2019-10-02 RX ADMIN — FLUDROCORTISONE ACETATE 0.1 MILLIGRAM(S): 0.1 TABLET ORAL at 06:58

## 2019-10-02 RX ADMIN — HEPARIN SODIUM 5000 UNIT(S): 5000 INJECTION INTRAVENOUS; SUBCUTANEOUS at 21:45

## 2019-10-02 RX ADMIN — CEFTRIAXONE 100 MILLIGRAM(S): 500 INJECTION, POWDER, FOR SOLUTION INTRAMUSCULAR; INTRAVENOUS at 11:15

## 2019-10-02 RX ADMIN — MIDODRINE HYDROCHLORIDE 10 MILLIGRAM(S): 2.5 TABLET ORAL at 11:14

## 2019-10-02 NOTE — CHART NOTE - NSCHARTNOTEFT_GEN_A_CORE
Upon Nutritional Assessment by the Registered Dietitian your patient was determined to meet criteria / has evidence of the following diagnosis/diagnoses:          [ ]  Mild Protein Calorie Malnutrition        [ ]  Moderate Protein Calorie Malnutrition        [ X] Severe Protein Calorie Malnutrition        [ ] Unspecified Protein Calorie Malnutrition        [X ] Underweight / BMI <19        [ ] Morbid Obesity / BMI > 40      Findings as based on:  [X ] Comprehensive nutrition assessment   [X ] Nutrition Focused Physical Exam  [X ] Other: Poor po intake, weight loss, muscle wasting, loss body fat      Nutrition Plan/Recommendations:      Suggest ensure enlive BID    PROVIDER Section:     By signing this assessment you are acknowledging and agree with the diagnosis/diagnoses assigned by the Registered Dietitian    Comments:

## 2019-10-02 NOTE — PROGRESS NOTE ADULT - ASSESSMENT
Infectious disease consult appreciated urine greater than 100,000 gram-negative rods blood cultures negative will discuss with infectious disease the length of antibiotics needed as his respiratory viral panel was positive for rhinovirus

## 2019-10-02 NOTE — DIETITIAN INITIAL EVALUATION ADULT. - OTHER INFO
Pt. is a poor historian, States her appetite is "OK", but pt. reported to be eating very little. May benefit by supplements. No BM noted. Denies any N/V/ diarrhea. Denies problems swallowing. Stage 1 right foot. Admits to weight loss, but is unsure how much. Severe malnutrition noted due to poor po intake, weight loss, low BMI.
normal (ped)...

## 2019-10-02 NOTE — PROGRESS NOTE ADULT - SUBJECTIVE AND OBJECTIVE BOX
Patient is a 89y old  Female who presents with a chief complaint of cough and generalized weakness (01 Oct 2019 08:46)      INTERVAL HPI/OVERNIGHT EVENTS:Still with mild cough      REVIEW OF SYSTEMS:  CONSTITUTIONAL: No fever, weight loss, or fatigue  EYES: No eye pain, visual disturbances, or discharge  ENMT:  No difficulty hearing, tinnitus, vertigo; No sinus or throat pain  NECK: No pain or stiffness  BREASTS: No pain, masses, or nipple discharge  RESPIRATORY: No cough, wheezing, chills or hemoptysis; No shortness of breath  CARDIOVASCULAR: No chest pain, palpitations, dizziness, or leg swelling  GASTROINTESTINAL: No abdominal or epigastric pain. No nausea, vomiting, or hematemesis; No diarrhea or constipation. No melena or hematochezia.  GENITOURINARY: No dysuria, frequency, hematuria, or incontinence  NEUROLOGICAL: No headaches, memory loss, loss of strength, numbness, or tremors  SKIN: No itching, burning, rashes, or lesions   LYMPH NODES: No enlarged glands  ENDOCRINE: No heat or cold intolerance; No hair loss  MUSCULOSKELETAL: No joint pain or swelling; No muscle, back, or extremity pain  PSYCHIATRIC: No depression, anxiety, mood swings, or difficulty sleeping  HEME/LYMPH: No easy bruising, or bleeding gums  ALLERY AND IMMUNOLOGIC: No hives or eczema  FAMILY HISTORY:  FH: prostate cancer: father    T(C): 36.9 (10-01-19 @ 22:51), Max: 36.9 (10-01-19 @ 22:51)  HR: 51 (10-02-19 @ 05:55) (47 - 64)  BP: 122/70 (10-02-19 @ 05:55) (115/57 - 122/70)  RR: 16 (10-02-19 @ 05:55) (15 - 16)  SpO2: 95% (10-02-19 @ 05:55) (95% - 97%)  Wt(kg): --Vital Signs Last 24 Hrs  T(C): 36.9 (01 Oct 2019 22:51), Max: 36.9 (01 Oct 2019 22:51)  T(F): 98.4 (01 Oct 2019 22:51), Max: 98.4 (01 Oct 2019 22:51)  HR: 51 (02 Oct 2019 05:55) (47 - 64)  BP: 122/70 (02 Oct 2019 05:55) (115/57 - 122/70)  BP(mean): --  RR: 16 (02 Oct 2019 05:55) (15 - 16)  SpO2: 95% (02 Oct 2019 05:55) (95% - 97%)    T(F): 98.4 (10-01-19 @ 22:51), Max: 101.4 (09-30-19 @ 13:42)  HR: 51 (10-02-19 @ 05:55) (47 - 78)  BP: 122/70 (10-02-19 @ 05:55) (113/50 - 152/56)  RR: 16 (10-02-19 @ 05:55) (14 - 22)  SpO2: 95% (10-02-19 @ 05:55) (95% - 99%)    PHYSICAL EXAM:  GENERAL: NAD, well-groomed, well-developed  HEAD:  Atraumatic, Normocephalic  EYES: EOMI, PERRLA, conjunctiva and sclera clear  ENMT: No tonsillar erythema, exudates, or enlargement; Moist mucous membranes, Good dentition, No lesions  NECK: Supple, No JVD, Normal thyroid  NERVOUS SYSTEM:  Alert & Oriented X3, Good concentration; Motor Strength 5/5 B/L upper and lower extremities; DTRs 2+ intact and symmetric  CHEST/LUNG: Clear to percussion bilaterally; No rales, rhonchi, wheezing, or rubs  HEART: Regular rate and rhythm; No murmurs, rubs, or gallops  ABDOMEN: Soft, Nontender, Nondistended; Bowel sounds present  EXTREMITIES:  2+ Peripheral Pulses, No clubbing, cyanosis, or edema  LYMPH: No lymphadenopathy noted  SKIN: No rashes or lesions    Consultant(s) Notes Reviewed:  [x ] YES  [ ] NO  Care Discussed with Consultants/Other Providers [ x] YES  [ ] NO    LABS:  10-02    144  |  107  |  21  ----------------------------<  87  3.5   |  29  |  0.86    Ca    10.3      02 Oct 2019 07:20  Mg     2.0     10-01    TPro  6.9  /  Alb  2.5<L>  /  TBili  0.5  /  DBili  x   /  AST  13  /  ALT  <6<L>  /  AlkPhos  65  10-02                          12.2   9.56  )-----------( 192      ( 30 Sep 2019 14:10 )             38.4       Culture - Urine (collected 09-30-19 @ 14:30)  Source: .Urine Clean Catch (Midstream)  Preliminary Report (10-01-19 @ 16:43):    >100,000 CFU/ml Gram Negative Rods    Culture - Blood (collected 09-30-19 @ 14:10)  Source: .Blood Blood-Peripheral  Preliminary Report (10-01-19 @ 20:00):    No growth to date.    Culture - Blood (collected 09-30-19 @ 14:10)  Source: .Blood Blood-Peripheral  Preliminary Report (10-01-19 @ 20:00):    No growth to date.        PT/INR - ( 30 Sep 2019 14:10 )   PT: 12.4 sec;   INR: 1.10 ratio         PTT - ( 30 Sep 2019 14:10 )  PTT:28.8 sec  LIVER FUNCTIONS - ( 02 Oct 2019 07:20 )  Alb: 2.5 g/dL / Pro: 6.9 g/dL / ALK PHOS: 65 U/L / ALT: <6 U/L / AST: 13 U/L / GGT: x                            12.2   9.56  )-----------( 192      ( 09-30 @ 14:10 )             38.4               RADIOLOGY & ADDITIONAL TESTS:    Imaging Personally Reviewed:  [ ] YES  [ ] NO  aspirin  chewable 81 milliGRAM(s) Oral daily  cefTRIAXone   IVPB 1000 milliGRAM(s) IV Intermittent every 24 hours  docusate sodium 100 milliGRAM(s) Oral two times a day  doxycycline hyclate Capsule 100 milliGRAM(s) Oral every 12 hours  fludroCORTISONE 0.1 milliGRAM(s) Oral two times a day  guaiFENesin  milliGRAM(s) Oral every 12 hours  heparin  Injectable 5000 Unit(s) SubCutaneous every 8 hours  influenza   Vaccine 0.5 milliLiter(s) IntraMuscular once  latanoprost 0.005% Ophthalmic Solution 1 Drop(s) Both EYES <User Schedule>  midodrine. 10 milliGRAM(s) Oral three times a day  multivitamin 1 Tablet(s) Oral daily  polyethylene glycol 3350 17 Gram(s) Oral daily  senna 1 Tablet(s) Oral at bedtime      HEALTH ISSUES - PROBLEM Dx:  Febrile illness, acute: Febrile illness, acute

## 2019-10-02 NOTE — PROGRESS NOTE ADULT - SUBJECTIVE AND OBJECTIVE BOX
CC: f/u for fever    Patient reports: no complaints but she still has a cough and some nasal congestion.    REVIEW OF SYSTEMS:  All other review of systems negative (Comprehensive ROS): limited by dementia, no dysuria.    Antimicrobials Day #  :day 3  cefTRIAXone   IVPB 1000 milliGRAM(s) IV Intermittent every 24 hours  doxycycline hyclate Capsule 100 milliGRAM(s) Oral every 12 hours    Other Medications Reviewed    T(F): 98.4 (10-01-19 @ 22:51), Max: 98.4 (10-01-19 @ 22:51)  HR: 51 (10-02-19 @ 05:55)  BP: 122/70 (10-02-19 @ 05:55)  RR: 16 (10-02-19 @ 05:55)  SpO2: 95% (10-02-19 @ 05:55)  Wt(kg): --    PHYSICAL EXAM:  General: alert, no acute distress  Eyes:  anicteric, no conjunctival injection, no discharge  Oropharynx: no lesions or injection 	  Neck: supple, without adenopathy  Lungs: coarse BS  Heart: regular rate and rhythm; no murmur, rubs or gallops  Abdomen: soft, nondistended, nontender, without mass or organomegaly  Skin: no lesions  Extremities: no clubbing, cyanosis, or edema  Neurologic: alert, oriented, moves all extremities    LAB RESULTS:    10-02    144  |  107  |  21  ----------------------------<  87  3.5   |  29  |  0.86    Ca    10.3      02 Oct 2019 07:20  Mg     2.0     10-01    TPro  6.9  /  Alb  2.5<L>  /  TBili  0.5  /  DBili  x   /  AST  13  /  ALT  <6<L>  /  AlkPhos  65  10-02    LIVER FUNCTIONS - ( 02 Oct 2019 07:20 )  Alb: 2.5 g/dL / Pro: 6.9 g/dL / ALK PHOS: 65 U/L / ALT: <6 U/L / AST: 13 U/L / GGT: x           Urinalysis Basic - ( 01 Oct 2019 19:35 )    Color: Yellow / Appearance: Clear / S.020 / pH: x  Gluc: x / Ketone: Negative  / Bili: Negative / Urobili: Negative   Blood: x / Protein: 30 mg/dL / Nitrite: Negative   Leuk Esterase: Trace / RBC: 5-10 /HPF / WBC 0-2 /HPF   Sq Epi: x / Non Sq Epi: Neg.-Few / Bacteria: Few /HPF      MICROBIOLOGY:  RECENT CULTURES:   @ 14:30 .Urine Clean Catch (Midstream)     >100,000 CFU/ml Gram Negative Rods       @ 14:10 .Blood Blood-Peripheral     No growth to date.          RADIOLOGY REVIEWED:  < from: US Head + Neck Soft Tissue (10.01.19 @ 11:43) >  IMPRESSION:    No cyst identified in the left supraclavicular region. Any palpable   abnormality is presumably solid. If there is still a palpable   abnormality, contrast-enhanced CT or MRI of the neck of the neck should   be performed.    Enlarged multinodular thyroid, larger than in 2011. Unable to compare   discrete nodules due to differences in technique.    < end of copied text >

## 2019-10-02 NOTE — CHART NOTE - NSCHARTNOTEFT_GEN_A_CORE
Reviewed prior progress notes, labs and imaging.    Discussed with Dr. Young    Primary Diagnosis: ev/rv, uti      Plan: iv rocephin, doxycycline, supportive management, follow up id        Anticipated Discharge: 24 hours

## 2019-10-02 NOTE — PROGRESS NOTE ADULT - ASSESSMENT
88 yo female with history of dementia, HTN,hysterectomy for ovarian cancer and left hip replacement admitted with a fever and failure to thrive.  She was treated for pneumonia 1 year ago.CXR negative , pneumonia is possible although her wbc is normal unlike 1 year ago when she had a leukocytosis of 15,000.  I would continue antibiotics pending cultures.This may be viral.I am concerned about empiric quinolones with there black box warnings.History limited by dementia, labs notable for hypercalcemia, being evaluated.  Her RVP is postive for RV/EV.her PC is only .18  GNR in urine likely colonizers  Suggest:  1.Will consider d/c of antibiotics in AM or conversion to oral  2.Possible viral syndrome with asymptomatic bactiuria  3.Anticipate d/c of IV antibiotics in AM, if antibiotic resistant organism isolated in urine I am not sure I would treat it

## 2019-10-02 NOTE — PROGRESS NOTE ADULT - PROBLEM SELECTOR PLAN 1
Respiratory viral panel positive for rhinovirus urine growing greater than 100,000 gram-negative rods discussed with ID antibiotics at this point physical therapy to continue

## 2019-10-03 ENCOUNTER — TRANSCRIPTION ENCOUNTER (OUTPATIENT)
Age: 84
End: 2019-10-03

## 2019-10-03 VITALS
TEMPERATURE: 98 F | HEART RATE: 54 BPM | SYSTOLIC BLOOD PRESSURE: 125 MMHG | RESPIRATION RATE: 15 BRPM | DIASTOLIC BLOOD PRESSURE: 43 MMHG | OXYGEN SATURATION: 94 %

## 2019-10-03 PROCEDURE — 99239 HOSP IP/OBS DSCHRG MGMT >30: CPT

## 2019-10-03 RX ADMIN — FLUDROCORTISONE ACETATE 0.1 MILLIGRAM(S): 0.1 TABLET ORAL at 17:34

## 2019-10-03 RX ADMIN — Medication 600 MILLIGRAM(S): at 17:34

## 2019-10-03 RX ADMIN — Medication 81 MILLIGRAM(S): at 12:41

## 2019-10-03 RX ADMIN — MIDODRINE HYDROCHLORIDE 10 MILLIGRAM(S): 2.5 TABLET ORAL at 06:08

## 2019-10-03 RX ADMIN — FLUDROCORTISONE ACETATE 0.1 MILLIGRAM(S): 0.1 TABLET ORAL at 06:08

## 2019-10-03 RX ADMIN — Medication 100 MILLIGRAM(S): at 17:34

## 2019-10-03 RX ADMIN — POLYETHYLENE GLYCOL 3350 17 GRAM(S): 17 POWDER, FOR SOLUTION ORAL at 12:41

## 2019-10-03 RX ADMIN — HEPARIN SODIUM 5000 UNIT(S): 5000 INJECTION INTRAVENOUS; SUBCUTANEOUS at 15:19

## 2019-10-03 RX ADMIN — Medication 100 MILLIGRAM(S): at 06:08

## 2019-10-03 RX ADMIN — Medication 1 TABLET(S): at 12:40

## 2019-10-03 RX ADMIN — MIDODRINE HYDROCHLORIDE 10 MILLIGRAM(S): 2.5 TABLET ORAL at 17:34

## 2019-10-03 RX ADMIN — Medication 600 MILLIGRAM(S): at 06:08

## 2019-10-03 RX ADMIN — HEPARIN SODIUM 5000 UNIT(S): 5000 INJECTION INTRAVENOUS; SUBCUTANEOUS at 06:08

## 2019-10-03 RX ADMIN — MIDODRINE HYDROCHLORIDE 10 MILLIGRAM(S): 2.5 TABLET ORAL at 12:41

## 2019-10-03 NOTE — DISCHARGE NOTE NURSING/CASE MANAGEMENT/SOCIAL WORK - PATIENT PORTAL LINK FT
You can access the FollowMyHealth Patient Portal offered by [facility] by registering at the following website: [email or web address]. By joining [facility]’s FollowMyHealth portal, you will also be able to view your health information using other applications (apps) compatible with our system.

## 2019-10-03 NOTE — PROGRESS NOTE ADULT - REASON FOR ADMISSION
cough and generalized weakness

## 2019-10-03 NOTE — PROGRESS NOTE ADULT - SUBJECTIVE AND OBJECTIVE BOX
Patient is a 89y old  Female who presents with a chief complaint of cough and generalized weakness (02 Oct 2019 14:24)      INTERVAL HPI/OVERNIGHT EVENTS: resting      REVIEW OF SYSTEMS:  CONSTITUTIONAL: No fever, weight loss, or fatigue  EYES: No eye pain, visual disturbances, or discharge  ENMT:  No difficulty hearing, tinnitus, vertigo; No sinus or throat pain  NECK: No pain or stiffness  BREASTS: No pain, masses, or nipple discharge  RESPIRATORY: No cough, wheezing, chills or hemoptysis; No shortness of breath  CARDIOVASCULAR: No chest pain, palpitations, dizziness, or leg swelling  GASTROINTESTINAL: No abdominal or epigastric pain. No nausea, vomiting, or hematemesis; No diarrhea or constipation. No melena or hematochezia.  GENITOURINARY: No dysuria, frequency, hematuria, or incontinence  NEUROLOGICAL: No headaches, memory loss, loss of strength, numbness, or tremors  SKIN: No itching, burning, rashes, or lesions   LYMPH NODES: No enlarged glands  ENDOCRINE: No heat or cold intolerance; No hair loss  MUSCULOSKELETAL: No joint pain or swelling; No muscle, back, or extremity pain  PSYCHIATRIC: No depression, anxiety, mood swings, or difficulty sleeping  HEME/LYMPH: No easy bruising, or bleeding gums  ALLERY AND IMMUNOLOGIC: No hives or eczema  FAMILY HISTORY:  FH: prostate cancer: father    T(C): 36.5 (10-03-19 @ 06:09), Max: 36.9 (10-02-19 @ 21:43)  HR: 67 (10-03-19 @ 06:09) (52 - 67)  BP: 114/58 (10-03-19 @ 06:09) (113/36 - 132/48)  RR: 16 (10-03-19 @ 06:09) (14 - 16)  SpO2: 97% (10-03-19 @ 06:09) (94% - 97%)  Wt(kg): --Vital Signs Last 24 Hrs  T(C): 36.5 (03 Oct 2019 06:09), Max: 36.9 (02 Oct 2019 21:43)  T(F): 97.7 (03 Oct 2019 06:09), Max: 98.4 (02 Oct 2019 21:43)  HR: 67 (03 Oct 2019 06:09) (52 - 67)  BP: 114/58 (03 Oct 2019 06:09) (113/36 - 132/48)  BP(mean): --  RR: 16 (03 Oct 2019 06:09) (14 - 16)  SpO2: 97% (03 Oct 2019 06:09) (94% - 97%)    T(F): 97.7 (10-03-19 @ 06:09), Max: 101.4 (09-30-19 @ 13:42)  HR: 67 (10-03-19 @ 06:09) (47 - 78)  BP: 114/58 (10-03-19 @ 06:09) (113/36 - 152/56)  RR: 16 (10-03-19 @ 06:09) (14 - 22)  SpO2: 97% (10-03-19 @ 06:09) (94% - 99%)    PHYSICAL EXAM:  GENERAL: NAD, well-groomed, well-developed  HEAD:  Atraumatic, Normocephalic  EYES: EOMI, PERRLA, conjunctiva and sclera clear  ENMT: No tonsillar erythema, exudates, or enlargement; Moist mucous membranes, Good dentition, No lesions  NECK: Supple, No JVD, Normal thyroid  NERVOUS SYSTEM:  Alert & Oriented X3, Good concentration; Motor Strength 5/5 B/L upper and lower extremities; DTRs 2+ intact and symmetric  CHEST/LUNG: Clear to percussion bilaterally; No rales, rhonchi, wheezing, or rubs  HEART: Regular rate and rhythm; No murmurs, rubs, or gallops  ABDOMEN: Soft, Nontender, Nondistended; Bowel sounds present  EXTREMITIES:  2+ Peripheral Pulses, No clubbing, cyanosis, or edema  LYMPH: No lymphadenopathy noted  SKIN: No rashes or lesions    Consultant(s) Notes Reviewed:  [x ] YES  [ ] NO  Care Discussed with Consultants/Other Providers [ x] YES  [ ] NO    LABS:  10-02    144  |  107  |  21  ----------------------------<  87  3.5   |  29  |  0.86    Ca    10.3      02 Oct 2019 07:20    TPro  6.9  /  Alb  2.5<L>  /  TBili  0.5  /  DBili  x   /  AST  13  /  ALT  <6<L>  /  AlkPhos  65  10-02        Culture - Urine (collected 09-30-19 @ 14:30)  Source: .Urine Clean Catch (Midstream)  Final Report (10-02-19 @ 17:39):    >100,000 CFU/ml Escherichia coli  Organism: Escherichia coli (10-02-19 @ 17:39)  Organism: Escherichia coli (10-02-19 @ 17:39)      -  Amikacin: S <=16      -  Ampicillin: R >16 These ampicillin results predict results for amoxicillin      -  Ampicillin/Sulbactam: R >16/8 Enterobacter, Citrobacter, and Serratia may develop resistance during prolonged therapy (3-4 days)      -  Aztreonam: S <=4      -  Cefazolin: S <=8 (MIC_CL_COM_ENTERIC_CEFAZU) For uncomplicated UTI with K. pneumoniae, E. coli, or P. mirablis: MARLEY <=16 is sensitive and MARLEY >=32 is resistant. This also predicts results for oral agents cefaclor, cefdinir, cefpodoxime, cefprozil, cefuroxime axetil, cephalexin and locarbef for uncomplicated UTI. Note that some isolates may be susceptible to these agents while testing resistant to cefazolin.      -  Cefepime: S <=4      -  Cefoxitin: S <=8      -  Ceftriaxone: S <=1 Enterobacter, Citrobacter, and Serratia may develop resistance during prolonged therapy      -  Ciprofloxacin: S <=1      -  Gentamicin: S <=4      -  Imipenem: S <=1      -  Levofloxacin: S <=2      -  Meropenem: S <=1      -  Nitrofurantoin: S <=32 Should not be used to treat pyelonephritis      -  Piperacillin/Tazobactam: S <=16      -  Tigecycline: S <=2      -  Tobramycin: S <=4      -  Trimethoprim/Sulfamethoxazole: R >2/38      Method Type: MARLEY    Culture - Blood (collected 09-30-19 @ 14:10)  Source: .Blood Blood-Peripheral  Preliminary Report (10-01-19 @ 20:00):    No growth to date.    Culture - Blood (collected 09-30-19 @ 14:10)  Source: .Blood Blood-Peripheral  Preliminary Report (10-01-19 @ 20:00):    No growth to date.          LIVER FUNCTIONS - ( 02 Oct 2019 07:20 )  Alb: 2.5 g/dL / Pro: 6.9 g/dL / ALK PHOS: 65 U/L / ALT: <6 U/L / AST: 13 U/L / GGT: x                            12.2   9.56  )-----------( 192      ( 09-30 @ 14:10 )             38.4               RADIOLOGY & ADDITIONAL TESTS:    Imaging Personally Reviewed:  [ ] YES  [ ] NO  aspirin  chewable 81 milliGRAM(s) Oral daily  docusate sodium 100 milliGRAM(s) Oral two times a day  fludroCORTISONE 0.1 milliGRAM(s) Oral two times a day  guaiFENesin  milliGRAM(s) Oral every 12 hours  heparin  Injectable 5000 Unit(s) SubCutaneous every 8 hours  influenza   Vaccine 0.5 milliLiter(s) IntraMuscular once  latanoprost 0.005% Ophthalmic Solution 1 Drop(s) Both EYES <User Schedule>  midodrine. 10 milliGRAM(s) Oral three times a day  multivitamin 1 Tablet(s) Oral daily  polyethylene glycol 3350 17 Gram(s) Oral daily  senna 1 Tablet(s) Oral at bedtime      HEALTH ISSUES - PROBLEM Dx:  Febrile illness, acute: Febrile illness, acute

## 2019-10-03 NOTE — CHART NOTE - NSCHARTNOTEFT_GEN_A_CORE
Reviewed prior progress notes, labs and imaging.    Discussed with Dr. Bishop    Primary Diagnosis: Enterovirus, Rhinovirus.       Plan: Discharge home with home PT today 10/3/19

## 2019-10-03 NOTE — DISCHARGE NOTE PROVIDER - CARE PROVIDER_API CALL
Helio Amor)  Gastroenterology; Internal Medicine  [address] [place] [address], [address]  [place], [place] [id number]  Phone: [phone]  Fax: [phone]  Follow Up Time:

## 2019-10-03 NOTE — CHART NOTE - NSCHARTNOTEFT_GEN_A_CORE
Pt cleared for discharge by Dr. Bishop.     Left voicemail with callback number for daughter Ruthann Zamora or son-in-law Jeronimo Zamora at number listed in chart  ([phone]) to discuss discharge. Will try calling again Pt cleared for discharge by Dr. Bishop.     Left voicemail with callback number for daughter Ruthann Zamora or son-in-law Jeronimo Zamora at number listed in chart  ([phone]) to discuss discharge. Will try calling again      1400: I was able to reach pretty Beavers at [phone], she will come to  patient at 5:30 PM today

## 2019-10-03 NOTE — DISCHARGE NOTE PROVIDER - HOSPITAL COURSE
89 yo female with PMH hypotension, ovarian CA s/p total hysterectomy with oophorectomy and omental resection, glaucoma, dementia presents to the ED complaining of cough and generalized weakness x 5 days. RVP positive for enterovirus and rhinovirus, blood cultures negative. Urine culture positive for e. coli. She was seen by ID, suspected viral syndrome with asymptomatic bacteriuria. Stable for DC home with home PT        Discussed with Dr Bishop today 10/3/19 88 yo female with PMH hypotension, ovarian CA s/p total hysterectomy with oophorectomy and omental resection, glaucoma, dementia presents to the ED complaining of cough and generalized weakness x 5 days. RVP positive for enterovirus and rhinovirus, blood cultures negative. Urine culture positive for e. coli. She was seen by ID, suspected viral syndrome with asymptomatic bacteriuria. Stable for DC home with home PT.         REQUIRES HOME PT FOR ACUTE GAIT DYSFUNCTION SECONDARY TO ACUTE VIRAL ILLNESS        Discussed with Dr Bishop today 10/3/19

## 2019-10-03 NOTE — DISCHARGE NOTE PROVIDER - NSDCCPCAREPLAN_GEN_ALL_CORE_FT
PRINCIPAL DISCHARGE DIAGNOSIS  Diagnosis: Acute viral syndrome  Assessment and Plan of Treatment: - Follow up with Dr. Bishop  - Drink plenty of fluids      SECONDARY DISCHARGE DIAGNOSES  Diagnosis: UTI (urinary tract infection)  Assessment and Plan of Treatment:

## 2019-10-03 NOTE — PROGRESS NOTE ADULT - ASSESSMENT
infectious disease note appreciated  favor d/c antibiotics   positive rhinovirus  discussed son who is ok with home discharge with home PT

## 2019-10-13 PROCEDURE — 99285 EMERGENCY DEPT VISIT HI MDM: CPT | Mod: 25

## 2019-10-13 PROCEDURE — 36415 COLL VENOUS BLD VENIPUNCTURE: CPT

## 2019-10-13 PROCEDURE — 87798 DETECT AGENT NOS DNA AMP: CPT

## 2019-10-13 PROCEDURE — 87633 RESP VIRUS 12-25 TARGETS: CPT

## 2019-10-13 PROCEDURE — 82310 ASSAY OF CALCIUM: CPT

## 2019-10-13 PROCEDURE — 76536 US EXAM OF HEAD AND NECK: CPT

## 2019-10-13 PROCEDURE — 83735 ASSAY OF MAGNESIUM: CPT

## 2019-10-13 PROCEDURE — 83970 ASSAY OF PARATHORMONE: CPT

## 2019-10-13 PROCEDURE — 87186 SC STD MICRODIL/AGAR DIL: CPT

## 2019-10-13 PROCEDURE — 85027 COMPLETE CBC AUTOMATED: CPT

## 2019-10-13 PROCEDURE — 85730 THROMBOPLASTIN TIME PARTIAL: CPT

## 2019-10-13 PROCEDURE — 83605 ASSAY OF LACTIC ACID: CPT

## 2019-10-13 PROCEDURE — 85610 PROTHROMBIN TIME: CPT

## 2019-10-13 PROCEDURE — 87486 CHLMYD PNEUM DNA AMP PROBE: CPT

## 2019-10-13 PROCEDURE — 93005 ELECTROCARDIOGRAM TRACING: CPT

## 2019-10-13 PROCEDURE — 87086 URINE CULTURE/COLONY COUNT: CPT

## 2019-10-13 PROCEDURE — 87581 M.PNEUMON DNA AMP PROBE: CPT

## 2019-10-13 PROCEDURE — 80053 COMPREHEN METABOLIC PANEL: CPT

## 2019-10-13 PROCEDURE — 84145 PROCALCITONIN (PCT): CPT

## 2019-10-13 PROCEDURE — 97116 GAIT TRAINING THERAPY: CPT

## 2019-10-13 PROCEDURE — 87040 BLOOD CULTURE FOR BACTERIA: CPT

## 2019-10-13 PROCEDURE — 97161 PT EVAL LOW COMPLEX 20 MIN: CPT

## 2019-10-13 PROCEDURE — 96365 THER/PROPH/DIAG IV INF INIT: CPT | Mod: XU

## 2019-10-13 PROCEDURE — 71045 X-RAY EXAM CHEST 1 VIEW: CPT

## 2019-10-13 PROCEDURE — 81001 URINALYSIS AUTO W/SCOPE: CPT

## 2019-10-13 PROCEDURE — 51701 INSERT BLADDER CATHETER: CPT

## 2019-10-22 ENCOUNTER — APPOINTMENT (OUTPATIENT)
Dept: INTERNAL MEDICINE | Facility: CLINIC | Age: 84
End: 2019-10-22
Payer: MEDICARE

## 2019-10-22 VITALS
SYSTOLIC BLOOD PRESSURE: 120 MMHG | HEIGHT: 65 IN | DIASTOLIC BLOOD PRESSURE: 78 MMHG | WEIGHT: 103 LBS | BODY MASS INDEX: 17.16 KG/M2 | RESPIRATION RATE: 15 BRPM | HEART RATE: 76 BPM

## 2019-10-22 DIAGNOSIS — Z87.440 PERSONAL HISTORY OF URINARY (TRACT) INFECTIONS: ICD-10-CM

## 2019-10-22 PROCEDURE — 99215 OFFICE O/P EST HI 40 MIN: CPT

## 2019-10-22 NOTE — REVIEW OF SYSTEMS
[Fever] : no fever [Chills] : no chills [Hot Flashes] : no hot flashes [Fatigue] : no fatigue [Recent Change In Weight] : ~T recent weight change [Night Sweats] : no night sweats [Pain] : no pain [Discharge] : no discharge [Dryness] : no dryness  [Redness] : no redness [Itching] : no itching [Vision Problems] : no vision problems [Earache] : no earache [Hearing Loss] : no hearing loss [Hoarseness] : no hoarseness [Nosebleed] : no nosebleeds [Nasal Discharge] : no nasal discharge [Sore Throat] : no sore throat [Postnasal Drip] : no postnasal drip [Chest Pain] : no chest pain [Palpitations] : no palpitations [Leg Claudication] : no leg claudication [Lower Ext Edema] : no lower extremity edema [Orthopnea] : no orthopnea [Wheezing] : no wheezing [Shortness Of Breath] : no shortness of breath [Cough] : no cough [Dyspnea on Exertion] : no dyspnea on exertion [Nausea] : no nausea [Constipation] : no constipation [Abdominal Pain] : no abdominal pain [Diarrhea] : diarrhea [Vomiting] : no vomiting [Melena] : no melena [Heartburn] : no heartburn [Dysuria] : no dysuria [Incontinence] : no incontinence [Nocturia] : nocturia [Hematuria] : no hematuria [Poor Libido] : libido not poor [Frequency] : frequency [Vaginal Discharge] : no vaginal discharge [Joint Pain] : no joint pain [Dysmenorrhea] : no dysmenorrhea [Joint Stiffness] : joint stiffness [Joint Swelling] : no joint swelling [Muscle Weakness] : muscle weakness [Muscle Pain] : no muscle pain [Back Pain] : back pain [Nail Changes] : no nail changes [Mole Changes] : no mole changes [Hair Changes] : no hair changes [Skin Rash] : no skin rash [Headache] : no headache [Dizziness] : no dizziness [Fainting] : no fainting [Confusion] : no confusion [Memory Loss] : memory loss [Suicidal] : not suicidal [Unsteady Walking] : ataxia [Insomnia] : insomnia [Anxiety] : no anxiety [Depression] : no depression [Easy Bleeding] : no easy bleeding [Easy Bruising] : no easy bruising [Swollen Glands] : no swollen glands [FreeTextEntry2] : weight loss 6 lbs

## 2019-10-22 NOTE — HISTORY OF PRESENT ILLNESS
[Family Member] : family member [FreeTextEntry1] : Comes to the office for followup accompanied by her daughter and son-in-law to review her medications and discuss her overall health brief admission 3 weeks ago for a febrile illness thought possibly due to urinary tract infection [de-identified] : 89-year-old woman comes to the office for followup and discharge from the hospital 20 days ago as she was kept overnight for a febrile illness she was treated for a urinary tract infection while in hospital with IV antibiotics which were not continued on discharge she has been afebrile with no sweats or myalgias she's had no headaches sinus congestion sore throat cough wheezing pleurisy chest pain shortness of breath exertional dyspnea lightheadedness palpitations dizziness vertigo or syncope she has had no abdominal pain nausea vomiting diarrhea or constipation bright red blood per rectum or black stools her appetite has been suboptimal and her weight is down 5-6 pounds since last measured she denies significant musculoskeletal complaints does urinate frequently but denies dysuria or gross hematuria she has had no leg edema or recent rashes

## 2019-10-22 NOTE — PHYSICAL EXAM
[No Acute Distress] : no acute distress [Well Nourished] : well nourished [Well Developed] : well developed [Well-Appearing] : well-appearing [Normal Sclera/Conjunctiva] : normal sclera/conjunctiva [PERRL] : pupils equal round and reactive to light [EOMI] : extraocular movements intact [Normal Oropharynx] : the oropharynx was normal [Normal Outer Ear/Nose] : the outer ears and nose were normal in appearance [No JVD] : no jugular venous distention [No Lymphadenopathy] : no lymphadenopathy [Supple] : supple [Thyroid Normal, No Nodules] : the thyroid was normal and there were no nodules present [No Respiratory Distress] : no respiratory distress  [No Accessory Muscle Use] : no accessory muscle use [Normal Rate] : normal rate  [Clear to Auscultation] : lungs were clear to auscultation bilaterally [Regular Rhythm] : with a regular rhythm [Normal S1, S2] : normal S1 and S2 [No Murmur] : no murmur heard [No Carotid Bruits] : no carotid bruits [No Abdominal Bruit] : a ~M bruit was not heard ~T in the abdomen [No Varicosities] : no varicosities [Pedal Pulses Present] : the pedal pulses are present [No Edema] : there was no peripheral edema [No Palpable Aorta] : no palpable aorta [No Extremity Clubbing/Cyanosis] : no extremity clubbing/cyanosis [Non Tender] : non-tender [Soft] : abdomen soft [No Masses] : no abdominal mass palpated [Non-distended] : non-distended [No HSM] : no HSM [Normal Bowel Sounds] : normal bowel sounds [Normal Posterior Cervical Nodes] : no posterior cervical lymphadenopathy [Normal Anterior Cervical Nodes] : no anterior cervical lymphadenopathy [No CVA Tenderness] : no CVA  tenderness [No Spinal Tenderness] : no spinal tenderness [No Joint Swelling] : no joint swelling [Grossly Normal Strength/Tone] : grossly normal strength/tone [No Rash] : no rash [No Focal Deficits] : no focal deficits [Coordination Grossly Intact] : coordination grossly intact [Normal Gait] : normal gait [Deep Tendon Reflexes (DTR)] : deep tendon reflexes were 2+ and symmetric [Normal Affect] : the affect was normal [Normal Insight/Judgement] : insight and judgment were intact

## 2019-10-22 NOTE — ASSESSMENT
[FreeTextEntry1] : Physical exam shows an elderly woman in no acute distress somewhat thin blood pressure 120/78 height 5 feet 5 inches weight 103 pounds heart rate is 76 respiratory rate of 15 HEENT was unremarkable chest was clear cardiovascular exam showed a normal S1 and S2 no extra heart sounds or murmurs abdomen was soft and nontender extremities showed no clubbing cyanosis or edema neurologic exam was nonfocal patient fully recovered from her hospitalization with no remaining cough or urinary symptoms long discussion with the family about her weight they will try to encourage her to snack between meals and will encourage nutritional supplements will obtain blood test performed in the hospital she was in the family was reminded to keep her up to date with her ophthalmologist. She is receiving physical therapy and I will renew the request when necessary

## 2019-10-22 NOTE — REVIEW OF SYSTEMS
[Fever] : no fever [Fatigue] : no fatigue [Chills] : no chills [Hot Flashes] : no hot flashes [Recent Change In Weight] : ~T recent weight change [Night Sweats] : no night sweats [Discharge] : no discharge [Pain] : no pain [Dryness] : no dryness  [Redness] : no redness [Vision Problems] : no vision problems [Itching] : no itching [Earache] : no earache [Hearing Loss] : no hearing loss [Nosebleed] : no nosebleeds [Hoarseness] : no hoarseness [Nasal Discharge] : no nasal discharge [Sore Throat] : no sore throat [Chest Pain] : no chest pain [Postnasal Drip] : no postnasal drip [Palpitations] : no palpitations [Leg Claudication] : no leg claudication [Lower Ext Edema] : no lower extremity edema [Orthopnea] : no orthopnea [Shortness Of Breath] : no shortness of breath [Wheezing] : no wheezing [Cough] : no cough [Dyspnea on Exertion] : no dyspnea on exertion [Constipation] : no constipation [Abdominal Pain] : no abdominal pain [Nausea] : no nausea [Diarrhea] : diarrhea [Vomiting] : no vomiting [Melena] : no melena [Heartburn] : no heartburn [Dysuria] : no dysuria [Incontinence] : no incontinence [Nocturia] : nocturia [Hematuria] : no hematuria [Poor Libido] : libido not poor [Frequency] : frequency [Vaginal Discharge] : no vaginal discharge [Dysmenorrhea] : no dysmenorrhea [Joint Pain] : no joint pain [Muscle Weakness] : muscle weakness [Joint Swelling] : no joint swelling [Joint Stiffness] : joint stiffness [Muscle Pain] : no muscle pain [Back Pain] : back pain [Nail Changes] : no nail changes [Mole Changes] : no mole changes [Hair Changes] : no hair changes [Headache] : no headache [Skin Rash] : no skin rash [Fainting] : no fainting [Dizziness] : no dizziness [Memory Loss] : memory loss [Confusion] : no confusion [Insomnia] : insomnia [Unsteady Walking] : ataxia [Suicidal] : not suicidal [Anxiety] : no anxiety [Depression] : no depression [Easy Bleeding] : no easy bleeding [Easy Bruising] : no easy bruising [Swollen Glands] : no swollen glands [FreeTextEntry2] : weight loss 6 lbs

## 2019-10-22 NOTE — HEALTH RISK ASSESSMENT
[No] : No [] : No [No falls in past year] : Patient reported no falls in the past year [0] : 2) Feeling down, depressed, or hopeless: Not at all (0) [[id number]] : 0

## 2019-10-22 NOTE — HISTORY OF PRESENT ILLNESS
[Family Member] : family member [de-identified] : 89-year-old woman comes to the office for followup and discharge from the hospital 20 days ago as she was kept overnight for a febrile illness she was treated for a urinary tract infection while in hospital with IV antibiotics which were not continued on discharge she has been afebrile with no sweats or myalgias she's had no headaches sinus congestion sore throat cough wheezing pleurisy chest pain shortness of breath exertional dyspnea lightheadedness palpitations dizziness vertigo or syncope she has had no abdominal pain nausea vomiting diarrhea or constipation bright red blood per rectum or black stools her appetite has been suboptimal and her weight is down 5-6 pounds since last measured she denies significant musculoskeletal complaints does urinate frequently but denies dysuria or gross hematuria she has had no leg edema or recent rashes [FreeTextEntry1] : Comes to the office for followup accompanied by her daughter and son-in-law to review her medications and discuss her overall health brief admission 3 weeks ago for a febrile illness thought possibly due to urinary tract infection

## 2019-10-22 NOTE — PHYSICAL EXAM
[No Acute Distress] : no acute distress [Well Developed] : well developed [Well Nourished] : well nourished [Well-Appearing] : well-appearing [Normal Sclera/Conjunctiva] : normal sclera/conjunctiva [EOMI] : extraocular movements intact [PERRL] : pupils equal round and reactive to light [Normal Oropharynx] : the oropharynx was normal [Normal Outer Ear/Nose] : the outer ears and nose were normal in appearance [Supple] : supple [No JVD] : no jugular venous distention [No Lymphadenopathy] : no lymphadenopathy [Thyroid Normal, No Nodules] : the thyroid was normal and there were no nodules present [No Respiratory Distress] : no respiratory distress  [No Accessory Muscle Use] : no accessory muscle use [Normal Rate] : normal rate  [Clear to Auscultation] : lungs were clear to auscultation bilaterally [Regular Rhythm] : with a regular rhythm [Normal S1, S2] : normal S1 and S2 [No Murmur] : no murmur heard [No Carotid Bruits] : no carotid bruits [No Varicosities] : no varicosities [No Abdominal Bruit] : a ~M bruit was not heard ~T in the abdomen [Pedal Pulses Present] : the pedal pulses are present [No Palpable Aorta] : no palpable aorta [No Edema] : there was no peripheral edema [No Extremity Clubbing/Cyanosis] : no extremity clubbing/cyanosis [Soft] : abdomen soft [Non Tender] : non-tender [Non-distended] : non-distended [No Masses] : no abdominal mass palpated [No HSM] : no HSM [Normal Bowel Sounds] : normal bowel sounds [Normal Posterior Cervical Nodes] : no posterior cervical lymphadenopathy [Normal Anterior Cervical Nodes] : no anterior cervical lymphadenopathy [No CVA Tenderness] : no CVA  tenderness [No Spinal Tenderness] : no spinal tenderness [No Joint Swelling] : no joint swelling [Grossly Normal Strength/Tone] : grossly normal strength/tone [No Rash] : no rash [Coordination Grossly Intact] : coordination grossly intact [Normal Gait] : normal gait [No Focal Deficits] : no focal deficits [Deep Tendon Reflexes (DTR)] : deep tendon reflexes were 2+ and symmetric [Normal Insight/Judgement] : insight and judgment were intact [Normal Affect] : the affect was normal

## 2020-01-06 ENCOUNTER — MEDICATION RENEWAL (OUTPATIENT)
Age: 85
End: 2020-01-06

## 2020-04-15 ENCOUNTER — RX RENEWAL (OUTPATIENT)
Age: 85
End: 2020-04-15

## 2020-07-01 ENCOUNTER — RX RENEWAL (OUTPATIENT)
Age: 85
End: 2020-07-01

## 2020-08-17 NOTE — H&P ADULT - NSHPPOACENTRALVENOUSCATHETER_GEN_ALL_CORE
Problem: Pain:  Goal: Pain level will decrease  Description: Pain level will decrease  8/17/2020 0107 by Arnulfo Lorenzo RN  Outcome: Ongoing  8/16/2020 1123 by Genna Nair RN  Outcome: Ongoing  Goal: Control of acute pain  Description: Control of acute pain  8/17/2020 0107 by Arnulfo Lorenzo RN  Outcome: Ongoing  8/16/2020 1123 by Genna Nair RN  Outcome: Ongoing  Goal: Control of chronic pain  Description: Control of chronic pain  8/17/2020 0107 by Arnulfo Lorenzo RN  Outcome: Ongoing  8/16/2020 1123 by Genna Nair RN  Outcome: Ongoing  Goal: Patient's pain/discomfort is manageable  Description: Patient's pain/discomfort is manageable  8/17/2020 0107 by Arnulfo Lorenzo RN  Outcome: Ongoing  8/16/2020 1123 by Genna Nair RN  Outcome: Ongoing     Problem: Discharge Planning:  Goal: Discharged to appropriate level of care  Description: Discharged to appropriate level of care  8/17/2020 0107 by Arnulfo Lorenzo RN  Outcome: Ongoing  8/16/2020 1123 by Genna Nair RN  Outcome: Ongoing     Problem: Infection:  Goal: Will remain free from infection  Description: Will remain free from infection  8/17/2020 0107 by Arnulfo Lorenzo RN  Outcome: Ongoing  8/16/2020 1123 by Genna Nair RN  Outcome: Ongoing     Problem: Safety:  Goal: Free from accidental physical injury  Description: Free from accidental physical injury  8/17/2020 0107 by Arnulfo Lorenzo RN  Outcome: Ongoing  8/16/2020 1123 by Genna Nair RN  Outcome: Ongoing  Goal: Free from intentional harm  Description: Free from intentional harm  8/17/2020 0107 by Arnulfo Lorenzo RN  Outcome: Ongoing  8/16/2020 1123 by Genna Nair RN  Outcome: Ongoing     Problem: Daily Care:  Goal: Daily care needs are met  Description: Daily care needs are met  8/17/2020 0107 by Arnulfo Lorenzo RN  Outcome: Ongoing  8/16/2020 1123 by Genna Nair RN  Outcome: Ongoing     Problem: Skin Integrity:  Goal: Skin integrity will stabilize  Description: Skin integrity will stabilize  8/17/2020 0107 by Marco Mccall RN  Outcome: Ongoing  8/16/2020 1123 by Sharmila Roberts RN  Outcome: Ongoing     Problem: Discharge Planning:  Goal: Patients continuum of care needs are met  Description: Patients continuum of care needs are met  8/17/2020 0107 by Marco Mccall RN  Outcome: Ongoing  8/16/2020 1123 by Sharmila Roberts RN  Outcome: Ongoing no

## 2020-09-10 NOTE — PHYSICAL THERAPY INITIAL EVALUATION ADULT - ADDITIONAL COMMENTS
Pt lives with daughter in house with 4 CAITLIN with 1 rail.  Pt uses cane and also has RW.  Daughter assists her with ADLs preop anxiety

## 2020-10-06 ENCOUNTER — RX RENEWAL (OUTPATIENT)
Age: 85
End: 2020-10-06

## 2020-10-07 ENCOUNTER — RX RENEWAL (OUTPATIENT)
Age: 85
End: 2020-10-07

## 2020-11-05 ENCOUNTER — RX RENEWAL (OUTPATIENT)
Age: 85
End: 2020-11-05

## 2020-11-14 ENCOUNTER — RX RENEWAL (OUTPATIENT)
Age: 85
End: 2020-11-14

## 2020-12-14 ENCOUNTER — RX RENEWAL (OUTPATIENT)
Age: 85
End: 2020-12-14

## 2020-12-21 PROBLEM — Z87.440 HISTORY OF URINARY TRACT INFECTION: Status: RESOLVED | Noted: 2019-10-22 | Resolved: 2020-12-21

## 2020-12-31 NOTE — DIETITIAN INITIAL EVALUATION ADULT. - NUTRITION CONSULT
What Type Of Note Output Would You Prefer (Optional)?: Standard Output
How Severe Is Your Rash?: mild
Is This A New Presentation, Or A Follow-Up?: Rash
Additional History: Pt states that he received Shingles Vaccine a few weeks ago. Injection site is tender. Has a itchy patch on back, using topical ointment from Spring appointment.
no

## 2021-01-01 ENCOUNTER — LABORATORY RESULT (OUTPATIENT)
Age: 86
End: 2021-01-01

## 2021-01-01 ENCOUNTER — APPOINTMENT (OUTPATIENT)
Dept: INTERNAL MEDICINE | Facility: CLINIC | Age: 86
End: 2021-01-01
Payer: MEDICARE

## 2021-01-01 ENCOUNTER — RX RENEWAL (OUTPATIENT)
Age: 86
End: 2021-01-01

## 2021-01-01 VITALS
BODY MASS INDEX: 17.16 KG/M2 | DIASTOLIC BLOOD PRESSURE: 70 MMHG | HEART RATE: 77 BPM | OXYGEN SATURATION: 98 % | RESPIRATION RATE: 16 BRPM | WEIGHT: 103 LBS | HEIGHT: 65 IN | TEMPERATURE: 97.7 F | SYSTOLIC BLOOD PRESSURE: 100 MMHG

## 2021-01-01 DIAGNOSIS — G90.9 DISORDER OF THE AUTONOMIC NERVOUS SYSTEM, UNSPECIFIED: ICD-10-CM

## 2021-01-01 DIAGNOSIS — G47.00 INSOMNIA, UNSPECIFIED: ICD-10-CM

## 2021-01-01 DIAGNOSIS — N31.9 NEUROMUSCULAR DYSFUNCTION OF BLADDER, UNSPECIFIED: ICD-10-CM

## 2021-01-01 DIAGNOSIS — E87.6 HYPOKALEMIA: ICD-10-CM

## 2021-01-01 DIAGNOSIS — H40.9 UNSPECIFIED GLAUCOMA: ICD-10-CM

## 2021-01-01 DIAGNOSIS — K59.00 CONSTIPATION, UNSPECIFIED: ICD-10-CM

## 2021-01-01 PROCEDURE — 99215 OFFICE O/P EST HI 40 MIN: CPT

## 2021-01-01 RX ORDER — POTASSIUM CHLORIDE 750 MG/1
10 TABLET, EXTENDED RELEASE ORAL
Qty: 180 | Refills: 2 | Status: DISCONTINUED | COMMUNITY
Start: 2017-03-10 | End: 2021-01-01

## 2021-01-25 ENCOUNTER — RX RENEWAL (OUTPATIENT)
Age: 86
End: 2021-01-25

## 2021-02-17 ENCOUNTER — RX RENEWAL (OUTPATIENT)
Age: 86
End: 2021-02-17

## 2021-03-27 ENCOUNTER — RX RENEWAL (OUTPATIENT)
Age: 86
End: 2021-03-27

## 2021-03-30 NOTE — DISCHARGE NOTE PROVIDER - NSDCQMCOGNITION_NEU_ALL_CORE
Received request via: Pharmacy    Was the patient seen in the last year in this department? Yes    Does the patient have an active prescription (recently filled or refills available) for medication(s) requested? No       OZEMPIC, 1 MG/DOSE, 2 MG/1.5ML Solution Pen-injector    Sig: INJECT 1 MG UNDER THE SKIN EVERY SEVEN DAYS.      
Difficulty remembering/Difficulty making decisions

## 2021-04-06 ENCOUNTER — RX RENEWAL (OUTPATIENT)
Age: 86
End: 2021-04-06

## 2021-06-16 ENCOUNTER — RX RENEWAL (OUTPATIENT)
Age: 86
End: 2021-06-16

## 2021-07-15 PROBLEM — E87.6 HYPOKALEMIA: Status: ACTIVE | Noted: 2017-03-10

## 2021-07-17 PROBLEM — K59.00 CONSTIPATION: Status: ACTIVE | Noted: 2021-01-01

## 2021-07-17 PROBLEM — N31.9 BLADDER DYSFUNCTION: Status: ACTIVE | Noted: 2017-04-11

## 2021-07-17 PROBLEM — H40.9 GLAUCOMA: Status: ACTIVE | Noted: 2017-04-11

## 2021-07-17 NOTE — ASSESSMENT
[FreeTextEntry1] : Physical examination shows an elderly woman in no acute distress pressure 100/70 height 5 foot 5 inches weight 103 pounds BMI 17.14 temperature of 97.7 °F heart rate of 77 respirations 16 oxygen saturation on room air is 98% HEENT was unremarkable chest was clear cardiovascular exam was regular with a 1 out of 6 systolic murmur abdomen was soft and nontender extremities showing no clubbing or cyanosis there was trace bilateral edema neurologic exam was nonfocal she has an unsteady gait patient is up-to-date with her ophthalmologist she does follow occasionally with a dermatologist for cancer screen a slip was given for complete blood tests which she will consider obtaining occluding CBC urine analysis urine culture reflex full chemistries lipid profile thyroid profile urine analysis CRP hemoglobin A1c vitamins D3 and B12 and COVID-19 nuclear capsid antibodies he defers on mammography and colorectal cancer screening.  Patient was encouraged to increase her fiber in her diet and liquids and to begin MiraLAX in the morning she is encouraged to take this daily and to use a Fleet enema or milk of magnesia if she gets constipated she is encouraged to keep active and to walk as much as she can

## 2021-07-17 NOTE — REVIEW OF SYSTEMS
[Fever] : no fever [Chills] : no chills [Fatigue] : no fatigue [Hot Flashes] : no hot flashes [Night Sweats] : no night sweats [Recent Change In Weight] : ~T no recent weight change [Discharge] : no discharge [Pain] : no pain [Redness] : no redness [Dryness] : no dryness  [Vision Problems] : no vision problems [Itching] : no itching [Earache] : no earache [Hearing Loss] : no hearing loss [Nosebleed] : no nosebleeds [Hoarseness] : no hoarseness [Nasal Discharge] : no nasal discharge [Sore Throat] : no sore throat [Postnasal Drip] : no postnasal drip [Chest Pain] : no chest pain [Palpitations] : no palpitations [Leg Claudication] : no leg claudication [Lower Ext Edema] : no lower extremity edema [Orthopnea] : no orthopnea [Paroxysmal Nocturnal Dyspnea] : no paroxysmal nocturnal dyspnea [Shortness Of Breath] : no shortness of breath [Wheezing] : no wheezing [Cough] : no cough [Dyspnea on Exertion] : no dyspnea on exertion [Abdominal Pain] : no abdominal pain [Nausea] : no nausea [Constipation] : constipation [Diarrhea] : diarrhea [Vomiting] : no vomiting [Heartburn] : no heartburn [Melena] : no melena [Dysuria] : no dysuria [Incontinence] : no incontinence [Nocturia] : nocturia [Poor Libido] : libido not poor [Hematuria] : no hematuria [Frequency] : frequency [Vaginal Discharge] : no vaginal discharge [Dysmenorrhea] : no dysmenorrhea [Joint Pain] : no joint pain [Joint Stiffness] : joint stiffness [Joint Swelling] : no joint swelling [Muscle Weakness] : muscle weakness [Muscle Pain] : no muscle pain [Back Pain] : back pain [Mole Changes] : no mole changes [Nail Changes] : no nail changes [Hair Changes] : no hair changes [Skin Rash] : no skin rash [Headache] : no headache [Dizziness] : no dizziness [Fainting] : no fainting [Confusion] : no confusion [Memory Loss] : memory loss [Unsteady Walking] : ataxia [Suicidal] : not suicidal [Insomnia] : insomnia [Anxiety] : no anxiety [Depression] : no depression [Easy Bleeding] : no easy bleeding [Easy Bruising] : no easy bruising [Swollen Glands] : no swollen glands

## 2021-07-17 NOTE — HISTORY OF PRESENT ILLNESS
[FreeTextEntry1] : 89-year-old woman comes to the office for follow-up to review her medications and discuss her overall health recently complaining of constipation [de-identified] : 89-year-old woman comes to the office for follow-up with a history of autonomic dysfunction bladder dysfunction insomnia glaucoma and occasional constipation he denies temperature chills sweats or myalgias she has had no headache sinus congestion sore throat cough wheezing pleurisy chest pain shortness of breath exertional dyspnea lightheadedness palpitations dizziness vertigo or syncope patient recently has been constipated she denies abdominal pain nausea vomiting diarrhea bright red blood per rectum or black stools her appetite has been good her weight has been stable recently frequently and at night but denies dysuria or gross hematuria she has chronic issues with her back and claims some muscle weakness in her arms and legs patient has had no leg edema skin rashes and her sleeping is erratic
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
## 2022-01-01 ENCOUNTER — RX RENEWAL (OUTPATIENT)
Age: 87
End: 2022-01-01

## 2022-01-01 ENCOUNTER — EMERGENCY (EMERGENCY)
Facility: HOSPITAL | Age: 87
LOS: 1 days | Discharge: HOME | End: 2022-01-01
Attending: EMERGENCY MEDICINE | Admitting: EMERGENCY MEDICINE
Payer: COMMERCIAL

## 2022-01-01 ENCOUNTER — INPATIENT (INPATIENT)
Facility: HOSPITAL | Age: 87
LOS: 3 days | DRG: 871 | End: 2022-07-14
Attending: INTERNAL MEDICINE | Admitting: INTERNAL MEDICINE
Payer: COMMERCIAL

## 2022-01-01 VITALS
HEART RATE: 111 BPM | DIASTOLIC BLOOD PRESSURE: 68 MMHG | TEMPERATURE: 97 F | RESPIRATION RATE: 18 BRPM | WEIGHT: 110.01 LBS | OXYGEN SATURATION: 100 % | SYSTOLIC BLOOD PRESSURE: 137 MMHG | HEIGHT: 64 IN

## 2022-01-01 VITALS — HEART RATE: 67 BPM | OXYGEN SATURATION: 98 % | RESPIRATION RATE: 16 BRPM

## 2022-01-01 VITALS — HEIGHT: 64 IN | WEIGHT: 89.95 LBS

## 2022-01-01 VITALS
HEART RATE: 86 BPM | RESPIRATION RATE: 18 BRPM | SYSTOLIC BLOOD PRESSURE: 109 MMHG | DIASTOLIC BLOOD PRESSURE: 55 MMHG | WEIGHT: 89.95 LBS | OXYGEN SATURATION: 78 % | HEIGHT: 64 IN

## 2022-01-01 DIAGNOSIS — R50.9 FEVER, UNSPECIFIED: ICD-10-CM

## 2022-01-01 DIAGNOSIS — U07.1 COVID-19: ICD-10-CM

## 2022-01-01 DIAGNOSIS — E87.0 HYPEROSMOLALITY AND HYPERNATREMIA: ICD-10-CM

## 2022-01-01 DIAGNOSIS — A41.9 SEPSIS, UNSPECIFIED ORGANISM: ICD-10-CM

## 2022-01-01 DIAGNOSIS — Z90.710 ACQUIRED ABSENCE OF BOTH CERVIX AND UTERUS: Chronic | ICD-10-CM

## 2022-01-01 LAB
ALBUMIN SERPL ELPH-MCNC: 1.6 G/DL — LOW (ref 3.3–5)
ALBUMIN SERPL ELPH-MCNC: 2 G/DL — LOW (ref 3.3–5)
ALBUMIN SERPL ELPH-MCNC: 2.1 G/DL — LOW (ref 3.3–5)
ALBUMIN SERPL ELPH-MCNC: 2.2 G/DL — LOW (ref 3.3–5)
ALBUMIN SERPL ELPH-MCNC: 2.4 G/DL — LOW (ref 3.3–5)
ALBUMIN SERPL ELPH-MCNC: 2.7 G/DL — LOW (ref 3.3–5)
ALP SERPL-CCNC: 48 U/L — SIGNIFICANT CHANGE UP (ref 40–120)
ALP SERPL-CCNC: 53 U/L — SIGNIFICANT CHANGE UP (ref 40–120)
ALP SERPL-CCNC: 54 U/L — SIGNIFICANT CHANGE UP (ref 40–120)
ALP SERPL-CCNC: 55 U/L — SIGNIFICANT CHANGE UP (ref 40–120)
ALP SERPL-CCNC: 55 U/L — SIGNIFICANT CHANGE UP (ref 40–120)
ALP SERPL-CCNC: 65 U/L — SIGNIFICANT CHANGE UP (ref 40–120)
ALT FLD-CCNC: 29 U/L — SIGNIFICANT CHANGE UP (ref 10–45)
ALT FLD-CCNC: 31 U/L — SIGNIFICANT CHANGE UP (ref 10–45)
ALT FLD-CCNC: 33 U/L — SIGNIFICANT CHANGE UP (ref 10–45)
ALT FLD-CCNC: 34 U/L — SIGNIFICANT CHANGE UP (ref 10–45)
ALT FLD-CCNC: 36 U/L — SIGNIFICANT CHANGE UP (ref 10–45)
ALT FLD-CCNC: 41 U/L — SIGNIFICANT CHANGE UP (ref 10–45)
ANION GAP SERPL CALC-SCNC: 10 MMOL/L — SIGNIFICANT CHANGE UP (ref 5–17)
ANION GAP SERPL CALC-SCNC: 11 MMOL/L — SIGNIFICANT CHANGE UP (ref 5–17)
ANION GAP SERPL CALC-SCNC: 6 MMOL/L — SIGNIFICANT CHANGE UP (ref 5–17)
ANION GAP SERPL CALC-SCNC: 7 MMOL/L — SIGNIFICANT CHANGE UP (ref 5–17)
ANION GAP SERPL CALC-SCNC: 8 MMOL/L — SIGNIFICANT CHANGE UP (ref 5–17)
ANISOCYTOSIS BLD QL: SLIGHT — SIGNIFICANT CHANGE UP
APPEARANCE UR: CLEAR — SIGNIFICANT CHANGE UP
APTT BLD: 28.6 SEC — SIGNIFICANT CHANGE UP (ref 27.5–35.5)
AST SERPL-CCNC: 43 U/L — HIGH (ref 10–40)
AST SERPL-CCNC: 43 U/L — HIGH (ref 10–40)
AST SERPL-CCNC: 57 U/L — HIGH (ref 10–40)
AST SERPL-CCNC: 60 U/L — HIGH (ref 10–40)
AST SERPL-CCNC: 60 U/L — HIGH (ref 10–40)
AST SERPL-CCNC: 67 U/L — HIGH (ref 10–40)
BACTERIA # UR AUTO: ABNORMAL /HPF
BASOPHILS # BLD AUTO: 0 K/UL — SIGNIFICANT CHANGE UP (ref 0–0.2)
BASOPHILS # BLD AUTO: 0.05 K/UL — SIGNIFICANT CHANGE UP (ref 0–0.2)
BASOPHILS NFR BLD AUTO: 0 % — SIGNIFICANT CHANGE UP (ref 0–2)
BASOPHILS NFR BLD AUTO: 0.6 % — SIGNIFICANT CHANGE UP (ref 0–2)
BILIRUB DIRECT SERPL-MCNC: 0 MG/DL — SIGNIFICANT CHANGE UP (ref 0–0.3)
BILIRUB DIRECT SERPL-MCNC: 0.1 MG/DL — SIGNIFICANT CHANGE UP (ref 0–0.3)
BILIRUB DIRECT SERPL-MCNC: 0.1 MG/DL — SIGNIFICANT CHANGE UP (ref 0–0.3)
BILIRUB DIRECT SERPL-MCNC: 0.2 MG/DL — SIGNIFICANT CHANGE UP (ref 0–0.3)
BILIRUB INDIRECT FLD-MCNC: 0.4 MG/DL — SIGNIFICANT CHANGE UP (ref 0.2–1)
BILIRUB INDIRECT FLD-MCNC: 0.4 MG/DL — SIGNIFICANT CHANGE UP (ref 0.2–1)
BILIRUB INDIRECT FLD-MCNC: 0.6 MG/DL — SIGNIFICANT CHANGE UP (ref 0.2–1)
BILIRUB INDIRECT FLD-MCNC: 0.6 MG/DL — SIGNIFICANT CHANGE UP (ref 0.2–1)
BILIRUB SERPL-MCNC: 0.5 MG/DL — SIGNIFICANT CHANGE UP (ref 0.2–1.2)
BILIRUB SERPL-MCNC: 0.5 MG/DL — SIGNIFICANT CHANGE UP (ref 0.2–1.2)
BILIRUB SERPL-MCNC: 0.6 MG/DL — SIGNIFICANT CHANGE UP (ref 0.2–1.2)
BILIRUB SERPL-MCNC: 0.6 MG/DL — SIGNIFICANT CHANGE UP (ref 0.2–1.2)
BILIRUB SERPL-MCNC: 0.8 MG/DL — SIGNIFICANT CHANGE UP (ref 0.2–1.2)
BILIRUB SERPL-MCNC: 0.8 MG/DL — SIGNIFICANT CHANGE UP (ref 0.2–1.2)
BILIRUB UR-MCNC: NEGATIVE — SIGNIFICANT CHANGE UP
BUN SERPL-MCNC: 22 MG/DL — SIGNIFICANT CHANGE UP (ref 7–23)
BUN SERPL-MCNC: 23 MG/DL — SIGNIFICANT CHANGE UP (ref 7–23)
BUN SERPL-MCNC: 25 MG/DL — HIGH (ref 7–23)
BUN SERPL-MCNC: 31 MG/DL — HIGH (ref 7–23)
BUN SERPL-MCNC: 43 MG/DL — HIGH (ref 7–23)
CALCIUM SERPL-MCNC: 9.3 MG/DL — SIGNIFICANT CHANGE UP (ref 8.4–10.5)
CALCIUM SERPL-MCNC: 9.6 MG/DL — SIGNIFICANT CHANGE UP (ref 8.4–10.5)
CALCIUM SERPL-MCNC: 9.6 MG/DL — SIGNIFICANT CHANGE UP (ref 8.4–10.5)
CALCIUM SERPL-MCNC: 9.7 MG/DL — SIGNIFICANT CHANGE UP (ref 8.4–10.5)
CALCIUM SERPL-MCNC: 9.9 MG/DL — SIGNIFICANT CHANGE UP (ref 8.4–10.5)
CHLORIDE SERPL-SCNC: 104 MMOL/L — SIGNIFICANT CHANGE UP (ref 96–108)
CHLORIDE SERPL-SCNC: 105 MMOL/L — SIGNIFICANT CHANGE UP (ref 96–108)
CHLORIDE SERPL-SCNC: 106 MMOL/L — SIGNIFICANT CHANGE UP (ref 96–108)
CHLORIDE SERPL-SCNC: 113 MMOL/L — HIGH (ref 96–108)
CHLORIDE SERPL-SCNC: 114 MMOL/L — HIGH (ref 96–108)
CO2 SERPL-SCNC: 27 MMOL/L — SIGNIFICANT CHANGE UP (ref 22–31)
CO2 SERPL-SCNC: 27 MMOL/L — SIGNIFICANT CHANGE UP (ref 22–31)
CO2 SERPL-SCNC: 30 MMOL/L — SIGNIFICANT CHANGE UP (ref 22–31)
COLOR SPEC: YELLOW — SIGNIFICANT CHANGE UP
CREAT SERPL-MCNC: 0.77 MG/DL — SIGNIFICANT CHANGE UP (ref 0.5–1.3)
CREAT SERPL-MCNC: 0.79 MG/DL — SIGNIFICANT CHANGE UP (ref 0.5–1.3)
CREAT SERPL-MCNC: 0.81 MG/DL — SIGNIFICANT CHANGE UP (ref 0.5–1.3)
CREAT SERPL-MCNC: 0.9 MG/DL — SIGNIFICANT CHANGE UP (ref 0.5–1.3)
CREAT SERPL-MCNC: 0.92 MG/DL — SIGNIFICANT CHANGE UP (ref 0.5–1.3)
CREAT SERPL-MCNC: 1.08 MG/DL — SIGNIFICANT CHANGE UP (ref 0.5–1.3)
CREAT SERPL-MCNC: 1.4 MG/DL — HIGH (ref 0.5–1.3)
CRP SERPL-MCNC: 111 MG/L — HIGH
CULTURE RESULTS: SIGNIFICANT CHANGE UP
D DIMER BLD IA.RAPID-MCNC: 1252 NG/ML DDU — HIGH
DIFF PNL FLD: ABNORMAL
EGFR: 35 ML/MIN/1.73M2 — LOW
EGFR: 48 ML/MIN/1.73M2 — LOW
EGFR: 59 ML/MIN/1.73M2 — LOW
EGFR: 60 ML/MIN/1.73M2 — SIGNIFICANT CHANGE UP
EGFR: 68 ML/MIN/1.73M2 — SIGNIFICANT CHANGE UP
EGFR: 70 ML/MIN/1.73M2 — SIGNIFICANT CHANGE UP
EGFR: 72 ML/MIN/1.73M2 — SIGNIFICANT CHANGE UP
EGFR: 72 ML/MIN/1.73M2 — SIGNIFICANT CHANGE UP
EGFR: 73 ML/MIN/1.73M2 — SIGNIFICANT CHANGE UP
EOSINOPHIL # BLD AUTO: 0 K/UL — SIGNIFICANT CHANGE UP (ref 0–0.5)
EOSINOPHIL # BLD AUTO: 0 K/UL — SIGNIFICANT CHANGE UP (ref 0–0.5)
EOSINOPHIL NFR BLD AUTO: 0 % — SIGNIFICANT CHANGE UP (ref 0–6)
EOSINOPHIL NFR BLD AUTO: 0 % — SIGNIFICANT CHANGE UP (ref 0–6)
EPI CELLS # UR: SIGNIFICANT CHANGE UP
FERRITIN SERPL-MCNC: 796 NG/ML — HIGH (ref 15–150)
GLUCOSE SERPL-MCNC: 121 MG/DL — HIGH (ref 70–99)
GLUCOSE SERPL-MCNC: 122 MG/DL — HIGH (ref 70–99)
GLUCOSE SERPL-MCNC: 124 MG/DL — HIGH (ref 70–99)
GLUCOSE SERPL-MCNC: 152 MG/DL — HIGH (ref 70–99)
GLUCOSE SERPL-MCNC: 163 MG/DL — HIGH (ref 70–99)
GLUCOSE UR QL: NEGATIVE — SIGNIFICANT CHANGE UP
HCT VFR BLD CALC: 33.9 % — LOW (ref 34.5–45)
HCT VFR BLD CALC: 39.2 % — SIGNIFICANT CHANGE UP (ref 34.5–45)
HCT VFR BLD CALC: 39.3 % — SIGNIFICANT CHANGE UP (ref 34.5–45)
HCT VFR BLD CALC: 42.4 % — SIGNIFICANT CHANGE UP (ref 34.5–45)
HCT VFR BLD CALC: 47 % — HIGH (ref 34.5–45)
HGB BLD-MCNC: 11.2 G/DL — LOW (ref 11.5–15.5)
HGB BLD-MCNC: 12.4 G/DL — SIGNIFICANT CHANGE UP (ref 11.5–15.5)
HGB BLD-MCNC: 12.7 G/DL — SIGNIFICANT CHANGE UP (ref 11.5–15.5)
HGB BLD-MCNC: 13.8 G/DL — SIGNIFICANT CHANGE UP (ref 11.5–15.5)
HGB BLD-MCNC: 14.7 G/DL — SIGNIFICANT CHANGE UP (ref 11.5–15.5)
IMM GRANULOCYTES NFR BLD AUTO: 0.7 % — SIGNIFICANT CHANGE UP (ref 0–1.5)
INR BLD: 1.02 RATIO — SIGNIFICANT CHANGE UP (ref 0.88–1.16)
KETONES UR-MCNC: NEGATIVE — SIGNIFICANT CHANGE UP
LACTATE SERPL-SCNC: 3.3 MMOL/L — HIGH (ref 0.7–2)
LACTATE SERPL-SCNC: 4.2 MMOL/L — CRITICAL HIGH (ref 0.7–2)
LACTATE SERPL-SCNC: 5.5 MMOL/L — CRITICAL HIGH (ref 0.7–2)
LDH SERPL L TO P-CCNC: 297 U/L — HIGH (ref 50–242)
LEUKOCYTE ESTERASE UR-ACNC: NEGATIVE — SIGNIFICANT CHANGE UP
LYMPHOCYTES # BLD AUTO: 0.52 K/UL — LOW (ref 1–3.3)
LYMPHOCYTES # BLD AUTO: 0.87 K/UL — LOW (ref 1–3.3)
LYMPHOCYTES # BLD AUTO: 6 % — LOW (ref 13–44)
LYMPHOCYTES # BLD AUTO: 6 % — LOW (ref 13–44)
MAGNESIUM SERPL-MCNC: 1.6 MG/DL — SIGNIFICANT CHANGE UP (ref 1.6–2.6)
MAGNESIUM SERPL-MCNC: 1.6 MG/DL — SIGNIFICANT CHANGE UP (ref 1.6–2.6)
MAGNESIUM SERPL-MCNC: 1.7 MG/DL — SIGNIFICANT CHANGE UP (ref 1.6–2.6)
MAGNESIUM SERPL-MCNC: 1.8 MG/DL — SIGNIFICANT CHANGE UP (ref 1.6–2.6)
MANUAL SMEAR VERIFICATION: SIGNIFICANT CHANGE UP
MCHC RBC-ENTMCNC: 30.4 PG — SIGNIFICANT CHANGE UP (ref 27–34)
MCHC RBC-ENTMCNC: 30.7 PG — SIGNIFICANT CHANGE UP (ref 27–34)
MCHC RBC-ENTMCNC: 31.1 PG — SIGNIFICANT CHANGE UP (ref 27–34)
MCHC RBC-ENTMCNC: 31.3 GM/DL — LOW (ref 32–36)
MCHC RBC-ENTMCNC: 31.4 PG — SIGNIFICANT CHANGE UP (ref 27–34)
MCHC RBC-ENTMCNC: 31.6 GM/DL — LOW (ref 32–36)
MCHC RBC-ENTMCNC: 31.9 PG — SIGNIFICANT CHANGE UP (ref 27–34)
MCHC RBC-ENTMCNC: 32.4 GM/DL — SIGNIFICANT CHANGE UP (ref 32–36)
MCHC RBC-ENTMCNC: 32.5 GM/DL — SIGNIFICANT CHANGE UP (ref 32–36)
MCHC RBC-ENTMCNC: 33 GM/DL — SIGNIFICANT CHANGE UP (ref 32–36)
MCV RBC AUTO: 101 FL — HIGH (ref 80–100)
MCV RBC AUTO: 92.1 FL — SIGNIFICANT CHANGE UP (ref 80–100)
MCV RBC AUTO: 94.4 FL — SIGNIFICANT CHANGE UP (ref 80–100)
MCV RBC AUTO: 97 FL — SIGNIFICANT CHANGE UP (ref 80–100)
MCV RBC AUTO: 99.6 FL — SIGNIFICANT CHANGE UP (ref 80–100)
MONOCYTES # BLD AUTO: 1.03 K/UL — HIGH (ref 0–0.9)
MONOCYTES # BLD AUTO: 1.16 K/UL — HIGH (ref 0–0.9)
MONOCYTES NFR BLD AUTO: 11.8 % — SIGNIFICANT CHANGE UP (ref 2–14)
MONOCYTES NFR BLD AUTO: 8 % — SIGNIFICANT CHANGE UP (ref 2–14)
NEUTROPHILS # BLD AUTO: 12.48 K/UL — HIGH (ref 1.8–7.4)
NEUTROPHILS # BLD AUTO: 7.06 K/UL — SIGNIFICANT CHANGE UP (ref 1.8–7.4)
NEUTROPHILS NFR BLD AUTO: 79 % — HIGH (ref 43–77)
NEUTROPHILS NFR BLD AUTO: 80.9 % — HIGH (ref 43–77)
NEUTS BAND # BLD: 7 % — SIGNIFICANT CHANGE UP (ref 0–8)
NITRITE UR-MCNC: POSITIVE
NRBC # BLD: 0 /100 WBCS — SIGNIFICANT CHANGE UP (ref 0–0)
NRBC # BLD: 0 /100 — SIGNIFICANT CHANGE UP (ref 0–0)
NT-PROBNP SERPL-SCNC: HIGH PG/ML (ref 0–300)
PH UR: 5 — SIGNIFICANT CHANGE UP (ref 5–8)
PLAT MORPH BLD: NORMAL — SIGNIFICANT CHANGE UP
PLATELET # BLD AUTO: 146 K/UL — LOW (ref 150–400)
PLATELET # BLD AUTO: 197 K/UL — SIGNIFICANT CHANGE UP (ref 150–400)
PLATELET # BLD AUTO: 198 K/UL — SIGNIFICANT CHANGE UP (ref 150–400)
PLATELET # BLD AUTO: 215 K/UL — SIGNIFICANT CHANGE UP (ref 150–400)
PLATELET # BLD AUTO: 282 K/UL — SIGNIFICANT CHANGE UP (ref 150–400)
POTASSIUM SERPL-MCNC: 3 MMOL/L — LOW (ref 3.5–5.3)
POTASSIUM SERPL-MCNC: 3 MMOL/L — LOW (ref 3.5–5.3)
POTASSIUM SERPL-MCNC: 3.1 MMOL/L — LOW (ref 3.5–5.3)
POTASSIUM SERPL-MCNC: 3.1 MMOL/L — LOW (ref 3.5–5.3)
POTASSIUM SERPL-MCNC: 3.8 MMOL/L — SIGNIFICANT CHANGE UP (ref 3.5–5.3)
POTASSIUM SERPL-SCNC: 3 MMOL/L — LOW (ref 3.5–5.3)
POTASSIUM SERPL-SCNC: 3 MMOL/L — LOW (ref 3.5–5.3)
POTASSIUM SERPL-SCNC: 3.1 MMOL/L — LOW (ref 3.5–5.3)
POTASSIUM SERPL-SCNC: 3.1 MMOL/L — LOW (ref 3.5–5.3)
POTASSIUM SERPL-SCNC: 3.8 MMOL/L — SIGNIFICANT CHANGE UP (ref 3.5–5.3)
PROCALCITONIN SERPL-MCNC: 2.02 NG/ML — HIGH
PROCALCITONIN SERPL-MCNC: 2.98 NG/ML — HIGH
PROT SERPL-MCNC: 6.2 G/DL — SIGNIFICANT CHANGE UP (ref 6–8.3)
PROT SERPL-MCNC: 6.4 G/DL — SIGNIFICANT CHANGE UP (ref 6–8.3)
PROT SERPL-MCNC: 6.5 G/DL — SIGNIFICANT CHANGE UP (ref 6–8.3)
PROT SERPL-MCNC: 6.9 G/DL — SIGNIFICANT CHANGE UP (ref 6–8.3)
PROT SERPL-MCNC: 6.9 G/DL — SIGNIFICANT CHANGE UP (ref 6–8.3)
PROT SERPL-MCNC: 7.5 G/DL — SIGNIFICANT CHANGE UP (ref 6–8.3)
PROT UR-MCNC: 100
PROTHROM AB SERPL-ACNC: 11.8 SEC — SIGNIFICANT CHANGE UP (ref 10.5–13.4)
RAPID RVP RESULT: DETECTED
RBC # BLD: 3.68 M/UL — LOW (ref 3.8–5.2)
RBC # BLD: 3.89 M/UL — SIGNIFICANT CHANGE UP (ref 3.8–5.2)
RBC # BLD: 4.04 M/UL — SIGNIFICANT CHANGE UP (ref 3.8–5.2)
RBC # BLD: 4.49 M/UL — SIGNIFICANT CHANGE UP (ref 3.8–5.2)
RBC # BLD: 4.72 M/UL — SIGNIFICANT CHANGE UP (ref 3.8–5.2)
RBC # FLD: 14.8 % — HIGH (ref 10.3–14.5)
RBC # FLD: 14.9 % — HIGH (ref 10.3–14.5)
RBC # FLD: 15.2 % — HIGH (ref 10.3–14.5)
RBC BLD AUTO: ABNORMAL
RBC CASTS # UR COMP ASSIST: ABNORMAL /HPF (ref 0–4)
SARS-COV-2 RNA SPEC QL NAA+PROBE: DETECTED
SODIUM SERPL-SCNC: 141 MMOL/L — SIGNIFICANT CHANGE UP (ref 135–145)
SODIUM SERPL-SCNC: 141 MMOL/L — SIGNIFICANT CHANGE UP (ref 135–145)
SODIUM SERPL-SCNC: 145 MMOL/L — SIGNIFICANT CHANGE UP (ref 135–145)
SODIUM SERPL-SCNC: 150 MMOL/L — HIGH (ref 135–145)
SODIUM SERPL-SCNC: 151 MMOL/L — HIGH (ref 135–145)
SP GR SPEC: 1.01 — SIGNIFICANT CHANGE UP (ref 1.01–1.02)
SPECIMEN SOURCE: SIGNIFICANT CHANGE UP
T4 FREE SERPL-MCNC: 0.9 NG/DL — SIGNIFICANT CHANGE UP (ref 0.9–1.8)
TROPONIN I, HIGH SENSITIVITY RESULT: 40 NG/L — SIGNIFICANT CHANGE UP
TROPONIN I, HIGH SENSITIVITY RESULT: 58.9 NG/L — HIGH
TROPONIN I, HIGH SENSITIVITY RESULT: 71.6 NG/L — HIGH
TSH SERPL-MCNC: 0.11 UIU/ML — LOW (ref 0.36–3.74)
UROBILINOGEN FLD QL: NEGATIVE — SIGNIFICANT CHANGE UP
WBC # BLD: 13.21 K/UL — HIGH (ref 3.8–10.5)
WBC # BLD: 14.51 K/UL — HIGH (ref 3.8–10.5)
WBC # BLD: 7.57 K/UL — SIGNIFICANT CHANGE UP (ref 3.8–10.5)
WBC # BLD: 8.72 K/UL — SIGNIFICANT CHANGE UP (ref 3.8–10.5)
WBC # BLD: 9.92 K/UL — SIGNIFICANT CHANGE UP (ref 3.8–10.5)
WBC # FLD AUTO: 13.21 K/UL — HIGH (ref 3.8–10.5)
WBC # FLD AUTO: 14.51 K/UL — HIGH (ref 3.8–10.5)
WBC # FLD AUTO: 7.57 K/UL — SIGNIFICANT CHANGE UP (ref 3.8–10.5)
WBC # FLD AUTO: 8.72 K/UL — SIGNIFICANT CHANGE UP (ref 3.8–10.5)
WBC # FLD AUTO: 9.92 K/UL — SIGNIFICANT CHANGE UP (ref 3.8–10.5)
WBC UR QL: SIGNIFICANT CHANGE UP /HPF (ref 0–5)

## 2022-01-01 PROCEDURE — 70450 CT HEAD/BRAIN W/O DYE: CPT | Mod: 26,MA

## 2022-01-01 PROCEDURE — 93005 ELECTROCARDIOGRAM TRACING: CPT

## 2022-01-01 PROCEDURE — 93970 EXTREMITY STUDY: CPT | Mod: 26

## 2022-01-01 PROCEDURE — 99222 1ST HOSP IP/OBS MODERATE 55: CPT

## 2022-01-01 PROCEDURE — 82565 ASSAY OF CREATININE: CPT

## 2022-01-01 PROCEDURE — 85379 FIBRIN DEGRADATION QUANT: CPT

## 2022-01-01 PROCEDURE — 80053 COMPREHEN METABOLIC PANEL: CPT

## 2022-01-01 PROCEDURE — 83605 ASSAY OF LACTIC ACID: CPT

## 2022-01-01 PROCEDURE — 93306 TTE W/DOPPLER COMPLETE: CPT

## 2022-01-01 PROCEDURE — 93970 EXTREMITY STUDY: CPT

## 2022-01-01 PROCEDURE — 93010 ELECTROCARDIOGRAM REPORT: CPT

## 2022-01-01 PROCEDURE — 0225U NFCT DS DNA&RNA 21 SARSCOV2: CPT

## 2022-01-01 PROCEDURE — 82728 ASSAY OF FERRITIN: CPT

## 2022-01-01 PROCEDURE — 92610 EVALUATE SWALLOWING FUNCTION: CPT

## 2022-01-01 PROCEDURE — 83880 ASSAY OF NATRIURETIC PEPTIDE: CPT

## 2022-01-01 PROCEDURE — 71275 CT ANGIOGRAPHY CHEST: CPT

## 2022-01-01 PROCEDURE — 85027 COMPLETE CBC AUTOMATED: CPT

## 2022-01-01 PROCEDURE — 87040 BLOOD CULTURE FOR BACTERIA: CPT

## 2022-01-01 PROCEDURE — 99285 EMERGENCY DEPT VISIT HI MDM: CPT | Mod: 25

## 2022-01-01 PROCEDURE — 85025 COMPLETE CBC W/AUTO DIFF WBC: CPT

## 2022-01-01 PROCEDURE — 96367 TX/PROPH/DG ADDL SEQ IV INF: CPT

## 2022-01-01 PROCEDURE — 36415 COLL VENOUS BLD VENIPUNCTURE: CPT

## 2022-01-01 PROCEDURE — 84443 ASSAY THYROID STIM HORMONE: CPT

## 2022-01-01 PROCEDURE — 83615 LACTATE (LD) (LDH) ENZYME: CPT

## 2022-01-01 PROCEDURE — 99239 HOSP IP/OBS DSCHRG MGMT >30: CPT

## 2022-01-01 PROCEDURE — 80076 HEPATIC FUNCTION PANEL: CPT

## 2022-01-01 PROCEDURE — 71045 X-RAY EXAM CHEST 1 VIEW: CPT

## 2022-01-01 PROCEDURE — 99233 SBSQ HOSP IP/OBS HIGH 50: CPT

## 2022-01-01 PROCEDURE — 84439 ASSAY OF FREE THYROXINE: CPT

## 2022-01-01 PROCEDURE — 86140 C-REACTIVE PROTEIN: CPT

## 2022-01-01 PROCEDURE — 99283 EMERGENCY DEPT VISIT LOW MDM: CPT

## 2022-01-01 PROCEDURE — 84145 PROCALCITONIN (PCT): CPT

## 2022-01-01 PROCEDURE — 99291 CRITICAL CARE FIRST HOUR: CPT

## 2022-01-01 PROCEDURE — 85610 PROTHROMBIN TIME: CPT

## 2022-01-01 PROCEDURE — 71045 X-RAY EXAM CHEST 1 VIEW: CPT | Mod: 26

## 2022-01-01 PROCEDURE — 99223 1ST HOSP IP/OBS HIGH 75: CPT

## 2022-01-01 PROCEDURE — 99235 HOSP IP/OBS SAME DATE MOD 70: CPT

## 2022-01-01 PROCEDURE — 81001 URINALYSIS AUTO W/SCOPE: CPT

## 2022-01-01 PROCEDURE — 84484 ASSAY OF TROPONIN QUANT: CPT

## 2022-01-01 PROCEDURE — 70450 CT HEAD/BRAIN W/O DYE: CPT | Mod: MA

## 2022-01-01 PROCEDURE — 85730 THROMBOPLASTIN TIME PARTIAL: CPT

## 2022-01-01 PROCEDURE — 87086 URINE CULTURE/COLONY COUNT: CPT

## 2022-01-01 PROCEDURE — 99232 SBSQ HOSP IP/OBS MODERATE 35: CPT

## 2022-01-01 PROCEDURE — 83735 ASSAY OF MAGNESIUM: CPT

## 2022-01-01 PROCEDURE — 99284 EMERGENCY DEPT VISIT MOD MDM: CPT

## 2022-01-01 PROCEDURE — 96365 THER/PROPH/DIAG IV INF INIT: CPT

## 2022-01-01 PROCEDURE — 93306 TTE W/DOPPLER COMPLETE: CPT | Mod: 26

## 2022-01-01 PROCEDURE — 71275 CT ANGIOGRAPHY CHEST: CPT | Mod: 26

## 2022-01-01 PROCEDURE — 80048 BASIC METABOLIC PNL TOTAL CA: CPT

## 2022-01-01 RX ORDER — POTASSIUM CHLORIDE 20 MEQ
40 PACKET (EA) ORAL EVERY 4 HOURS
Refills: 0 | Status: DISCONTINUED | OUTPATIENT
Start: 2022-01-01 | End: 2022-01-01

## 2022-01-01 RX ORDER — DOCUSATE SODIUM 100 MG
1 CAPSULE ORAL
Qty: 0 | Refills: 0 | DISCHARGE

## 2022-01-01 RX ORDER — VANCOMYCIN HCL 1 G
1000 VIAL (EA) INTRAVENOUS ONCE
Refills: 0 | Status: COMPLETED | OUTPATIENT
Start: 2022-01-01 | End: 2022-01-01

## 2022-01-01 RX ORDER — MAGNESIUM SULFATE 500 MG/ML
1 VIAL (ML) INJECTION ONCE
Refills: 0 | Status: COMPLETED | OUTPATIENT
Start: 2022-01-01 | End: 2022-01-01

## 2022-01-01 RX ORDER — MIDODRINE HYDROCHLORIDE 2.5 MG/1
1 TABLET ORAL
Qty: 0 | Refills: 0 | DISCHARGE

## 2022-01-01 RX ORDER — REMDESIVIR 5 MG/ML
INJECTION INTRAVENOUS
Refills: 0 | Status: DISCONTINUED | OUTPATIENT
Start: 2022-01-01 | End: 2022-01-01

## 2022-01-01 RX ORDER — ACETAMINOPHEN 500 MG
650 TABLET ORAL EVERY 6 HOURS
Refills: 0 | Status: DISCONTINUED | OUTPATIENT
Start: 2022-01-01 | End: 2022-01-01

## 2022-01-01 RX ORDER — SODIUM CHLORIDE 9 MG/ML
1000 INJECTION, SOLUTION INTRAVENOUS
Refills: 0 | Status: DISCONTINUED | OUTPATIENT
Start: 2022-01-01 | End: 2022-01-01

## 2022-01-01 RX ORDER — ACETAMINOPHEN 500 MG
650 TABLET ORAL ONCE
Refills: 0 | Status: COMPLETED | OUTPATIENT
Start: 2022-01-01 | End: 2022-01-01

## 2022-01-01 RX ORDER — LATANOPROST 0.05 MG/ML
1 SOLUTION/ DROPS OPHTHALMIC; TOPICAL
Qty: 0 | Refills: 0 | DISCHARGE

## 2022-01-01 RX ORDER — REMDESIVIR 5 MG/ML
100 INJECTION INTRAVENOUS EVERY 24 HOURS
Refills: 0 | Status: DISCONTINUED | OUTPATIENT
Start: 2022-01-01 | End: 2022-01-01

## 2022-01-01 RX ORDER — DEXAMETHASONE 0.5 MG/5ML
6 ELIXIR ORAL DAILY
Refills: 0 | Status: DISCONTINUED | OUTPATIENT
Start: 2022-01-01 | End: 2022-01-01

## 2022-01-01 RX ORDER — CHOLECALCIFEROL (VITAMIN D3) 125 MCG
2000 CAPSULE ORAL DAILY
Refills: 0 | Status: DISCONTINUED | OUTPATIENT
Start: 2022-01-01 | End: 2022-01-01

## 2022-01-01 RX ORDER — MIDODRINE HYDROCHLORIDE 2.5 MG/1
5 TABLET ORAL THREE TIMES A DAY
Refills: 0 | Status: DISCONTINUED | OUTPATIENT
Start: 2022-01-01 | End: 2022-01-01

## 2022-01-01 RX ORDER — ALBUTEROL 90 UG/1
1 AEROSOL, METERED ORAL EVERY 4 HOURS
Refills: 0 | Status: DISCONTINUED | OUTPATIENT
Start: 2022-01-01 | End: 2022-01-01

## 2022-01-01 RX ORDER — SODIUM CHLORIDE 9 MG/ML
1700 INJECTION INTRAMUSCULAR; INTRAVENOUS; SUBCUTANEOUS ONCE
Refills: 0 | Status: COMPLETED | OUTPATIENT
Start: 2022-01-01 | End: 2022-01-01

## 2022-01-01 RX ORDER — ZINC SULFATE TAB 220 MG (50 MG ZINC EQUIVALENT) 220 (50 ZN) MG
220 TAB ORAL DAILY
Refills: 0 | Status: DISCONTINUED | OUTPATIENT
Start: 2022-01-01 | End: 2022-01-01

## 2022-01-01 RX ORDER — PANTOPRAZOLE SODIUM 20 MG/1
40 TABLET, DELAYED RELEASE ORAL
Refills: 0 | Status: DISCONTINUED | OUTPATIENT
Start: 2022-01-01 | End: 2022-01-01

## 2022-01-01 RX ORDER — POTASSIUM CHLORIDE 20 MEQ
10 PACKET (EA) ORAL
Refills: 0 | Status: DISCONTINUED | OUTPATIENT
Start: 2022-01-01 | End: 2022-01-01

## 2022-01-01 RX ORDER — SENNA PLUS 8.6 MG/1
1 TABLET ORAL
Qty: 0 | Refills: 0 | DISCHARGE

## 2022-01-01 RX ORDER — ASPIRIN/CALCIUM CARB/MAGNESIUM 324 MG
1 TABLET ORAL
Qty: 0 | Refills: 0 | DISCHARGE

## 2022-01-01 RX ORDER — MAGNESIUM SULFATE 500 MG/ML
2 VIAL (ML) INJECTION ONCE
Refills: 0 | Status: COMPLETED | OUTPATIENT
Start: 2022-01-01 | End: 2022-01-01

## 2022-01-01 RX ORDER — FLUDROCORTISONE ACETATE 0.1 MG/1
0.1 TABLET ORAL
Refills: 0 | Status: DISCONTINUED | OUTPATIENT
Start: 2022-01-01 | End: 2022-01-01

## 2022-01-01 RX ORDER — PIPERACILLIN AND TAZOBACTAM 4; .5 G/20ML; G/20ML
3.38 INJECTION, POWDER, LYOPHILIZED, FOR SOLUTION INTRAVENOUS ONCE
Refills: 0 | Status: COMPLETED | OUTPATIENT
Start: 2022-01-01 | End: 2022-01-01

## 2022-01-01 RX ORDER — ENOXAPARIN SODIUM 100 MG/ML
40 INJECTION SUBCUTANEOUS EVERY 24 HOURS
Refills: 0 | Status: DISCONTINUED | OUTPATIENT
Start: 2022-01-01 | End: 2022-01-01

## 2022-01-01 RX ORDER — POTASSIUM CHLORIDE 20 MEQ
10 PACKET (EA) ORAL
Refills: 0 | Status: COMPLETED | OUTPATIENT
Start: 2022-01-01 | End: 2022-01-01

## 2022-01-01 RX ORDER — MIDODRINE HYDROCHLORIDE 10 MG/1
10 TABLET ORAL
Qty: 90 | Refills: 0 | Status: ACTIVE | COMMUNITY
Start: 2017-03-10 | End: 1900-01-01

## 2022-01-01 RX ORDER — MIDODRINE HYDROCHLORIDE 2.5 MG/1
10 TABLET ORAL THREE TIMES A DAY
Refills: 0 | Status: DISCONTINUED | OUTPATIENT
Start: 2022-01-01 | End: 2022-01-01

## 2022-01-01 RX ORDER — POLYETHYLENE GLYCOL 3350 17 G/17G
0 POWDER, FOR SOLUTION ORAL
Qty: 0 | Refills: 0 | DISCHARGE

## 2022-01-01 RX ORDER — REMDESIVIR 5 MG/ML
200 INJECTION INTRAVENOUS EVERY 24 HOURS
Refills: 0 | Status: COMPLETED | OUTPATIENT
Start: 2022-01-01 | End: 2022-01-01

## 2022-01-01 RX ORDER — LATANOPROST 0.05 MG/ML
1 SOLUTION/ DROPS OPHTHALMIC; TOPICAL AT BEDTIME
Refills: 0 | Status: DISCONTINUED | OUTPATIENT
Start: 2022-01-01 | End: 2022-01-01

## 2022-01-01 RX ORDER — SENNA PLUS 8.6 MG/1
2 TABLET ORAL AT BEDTIME
Refills: 0 | Status: DISCONTINUED | OUTPATIENT
Start: 2022-01-01 | End: 2022-01-01

## 2022-01-01 RX ORDER — DEXTROSE MONOHYDRATE, SODIUM CHLORIDE, AND POTASSIUM CHLORIDE 50; .745; 4.5 G/1000ML; G/1000ML; G/1000ML
1000 INJECTION, SOLUTION INTRAVENOUS
Refills: 0 | Status: DISCONTINUED | OUTPATIENT
Start: 2022-01-01 | End: 2022-01-01

## 2022-01-01 RX ORDER — POLYETHYLENE GLYCOL 3350 17 G/17G
17 POWDER, FOR SOLUTION ORAL DAILY
Refills: 0 | Status: DISCONTINUED | OUTPATIENT
Start: 2022-01-01 | End: 2022-01-01

## 2022-01-01 RX ORDER — FLUDROCORTISONE ACETATE 0.1 MG/1
1 TABLET ORAL
Qty: 0 | Refills: 0 | DISCHARGE

## 2022-01-01 RX ORDER — ASCORBIC ACID 60 MG
500 TABLET,CHEWABLE ORAL DAILY
Refills: 0 | Status: DISCONTINUED | OUTPATIENT
Start: 2022-01-01 | End: 2022-01-01

## 2022-01-01 RX ORDER — HEPARIN SODIUM 5000 [USP'U]/ML
5000 INJECTION INTRAVENOUS; SUBCUTANEOUS EVERY 8 HOURS
Refills: 0 | Status: DISCONTINUED | OUTPATIENT
Start: 2022-01-01 | End: 2022-01-01

## 2022-01-01 RX ADMIN — Medication 25 GRAM(S): at 08:40

## 2022-01-01 RX ADMIN — ZINC SULFATE TAB 220 MG (50 MG ZINC EQUIVALENT) 220 MILLIGRAM(S): 220 (50 ZN) TAB at 15:05

## 2022-01-01 RX ADMIN — Medication 650 MILLIGRAM(S): at 20:15

## 2022-01-01 RX ADMIN — SODIUM CHLORIDE 1700 MILLILITER(S): 9 INJECTION INTRAMUSCULAR; INTRAVENOUS; SUBCUTANEOUS at 17:53

## 2022-01-01 RX ADMIN — HEPARIN SODIUM 5000 UNIT(S): 5000 INJECTION INTRAVENOUS; SUBCUTANEOUS at 15:04

## 2022-01-01 RX ADMIN — ENOXAPARIN SODIUM 40 MILLIGRAM(S): 100 INJECTION SUBCUTANEOUS at 12:36

## 2022-01-01 RX ADMIN — Medication 100 MILLIEQUIVALENT(S): at 10:09

## 2022-01-01 RX ADMIN — SENNA PLUS 2 TABLET(S): 8.6 TABLET ORAL at 21:29

## 2022-01-01 RX ADMIN — Medication 6 MILLIGRAM(S): at 06:09

## 2022-01-01 RX ADMIN — SENNA PLUS 2 TABLET(S): 8.6 TABLET ORAL at 21:57

## 2022-01-01 RX ADMIN — Medication 500 MILLIGRAM(S): at 13:22

## 2022-01-01 RX ADMIN — Medication 6 MILLIGRAM(S): at 05:58

## 2022-01-01 RX ADMIN — DEXTROSE MONOHYDRATE, SODIUM CHLORIDE, AND POTASSIUM CHLORIDE 50 MILLILITER(S): 50; .745; 4.5 INJECTION, SOLUTION INTRAVENOUS at 10:08

## 2022-01-01 RX ADMIN — FLUDROCORTISONE ACETATE 0.1 MILLIGRAM(S): 0.1 TABLET ORAL at 06:09

## 2022-01-01 RX ADMIN — SODIUM CHLORIDE 1000 MILLILITER(S): 9 INJECTION, SOLUTION INTRAVENOUS at 22:04

## 2022-01-01 RX ADMIN — REMDESIVIR 500 MILLIGRAM(S): 5 INJECTION INTRAVENOUS at 18:19

## 2022-01-01 RX ADMIN — Medication 650 MILLIGRAM(S): at 20:00

## 2022-01-01 RX ADMIN — FLUDROCORTISONE ACETATE 0.1 MILLIGRAM(S): 0.1 TABLET ORAL at 05:41

## 2022-01-01 RX ADMIN — REMDESIVIR 500 MILLIGRAM(S): 5 INJECTION INTRAVENOUS at 18:27

## 2022-01-01 RX ADMIN — Medication 100 MILLIEQUIVALENT(S): at 11:36

## 2022-01-01 RX ADMIN — Medication 100 MILLIEQUIVALENT(S): at 12:49

## 2022-01-01 RX ADMIN — SODIUM CHLORIDE 60 MILLILITER(S): 9 INJECTION, SOLUTION INTRAVENOUS at 23:52

## 2022-01-01 RX ADMIN — LATANOPROST 1 DROP(S): 0.05 SOLUTION/ DROPS OPHTHALMIC; TOPICAL at 21:29

## 2022-01-01 RX ADMIN — FLUDROCORTISONE ACETATE 0.1 MILLIGRAM(S): 0.1 TABLET ORAL at 18:27

## 2022-01-01 RX ADMIN — PANTOPRAZOLE SODIUM 40 MILLIGRAM(S): 20 TABLET, DELAYED RELEASE ORAL at 05:42

## 2022-01-01 RX ADMIN — SENNA PLUS 2 TABLET(S): 8.6 TABLET ORAL at 22:19

## 2022-01-01 RX ADMIN — Medication 100 MILLIEQUIVALENT(S): at 16:11

## 2022-01-01 RX ADMIN — Medication 500 MILLIGRAM(S): at 15:04

## 2022-01-01 RX ADMIN — Medication 100 MILLIEQUIVALENT(S): at 15:16

## 2022-01-01 RX ADMIN — MIDODRINE HYDROCHLORIDE 10 MILLIGRAM(S): 2.5 TABLET ORAL at 18:27

## 2022-01-01 RX ADMIN — Medication 2000 UNIT(S): at 15:05

## 2022-01-01 RX ADMIN — MIDODRINE HYDROCHLORIDE 10 MILLIGRAM(S): 2.5 TABLET ORAL at 05:58

## 2022-01-01 RX ADMIN — HEPARIN SODIUM 5000 UNIT(S): 5000 INJECTION INTRAVENOUS; SUBCUTANEOUS at 05:58

## 2022-01-01 RX ADMIN — Medication 100 MILLIEQUIVALENT(S): at 11:35

## 2022-01-01 RX ADMIN — PIPERACILLIN AND TAZOBACTAM 200 GRAM(S): 4; .5 INJECTION, POWDER, LYOPHILIZED, FOR SOLUTION INTRAVENOUS at 17:54

## 2022-01-01 RX ADMIN — ENOXAPARIN SODIUM 40 MILLIGRAM(S): 100 INJECTION SUBCUTANEOUS at 13:21

## 2022-01-01 RX ADMIN — Medication 1 TABLET(S): at 15:04

## 2022-01-01 RX ADMIN — Medication 1 TABLET(S): at 13:22

## 2022-01-01 RX ADMIN — SODIUM CHLORIDE 50 MILLILITER(S): 9 INJECTION, SOLUTION INTRAVENOUS at 18:56

## 2022-01-01 RX ADMIN — HEPARIN SODIUM 5000 UNIT(S): 5000 INJECTION INTRAVENOUS; SUBCUTANEOUS at 21:29

## 2022-01-01 RX ADMIN — DEXTROSE MONOHYDRATE, SODIUM CHLORIDE, AND POTASSIUM CHLORIDE 50 MILLILITER(S): 50; .745; 4.5 INJECTION, SOLUTION INTRAVENOUS at 19:58

## 2022-01-01 RX ADMIN — FLUDROCORTISONE ACETATE 0.1 MILLIGRAM(S): 0.1 TABLET ORAL at 05:57

## 2022-01-01 RX ADMIN — FLUDROCORTISONE ACETATE 0.1 MILLIGRAM(S): 0.1 TABLET ORAL at 05:59

## 2022-01-01 RX ADMIN — MIDODRINE HYDROCHLORIDE 10 MILLIGRAM(S): 2.5 TABLET ORAL at 12:59

## 2022-01-01 RX ADMIN — LATANOPROST 1 DROP(S): 0.05 SOLUTION/ DROPS OPHTHALMIC; TOPICAL at 22:19

## 2022-01-01 RX ADMIN — Medication 6 MILLIGRAM(S): at 23:51

## 2022-01-01 RX ADMIN — Medication 100 MILLIEQUIVALENT(S): at 12:35

## 2022-01-01 RX ADMIN — Medication 100 MILLIEQUIVALENT(S): at 17:14

## 2022-01-01 RX ADMIN — PANTOPRAZOLE SODIUM 40 MILLIGRAM(S): 20 TABLET, DELAYED RELEASE ORAL at 06:09

## 2022-01-01 RX ADMIN — SODIUM CHLORIDE 50 MILLILITER(S): 9 INJECTION, SOLUTION INTRAVENOUS at 06:18

## 2022-01-01 RX ADMIN — MIDODRINE HYDROCHLORIDE 10 MILLIGRAM(S): 2.5 TABLET ORAL at 13:22

## 2022-01-01 RX ADMIN — SODIUM CHLORIDE 1700 MILLILITER(S): 9 INJECTION INTRAMUSCULAR; INTRAVENOUS; SUBCUTANEOUS at 20:45

## 2022-01-01 RX ADMIN — Medication 250 MILLIGRAM(S): at 19:51

## 2022-01-01 RX ADMIN — Medication 2000 UNIT(S): at 13:22

## 2022-01-01 RX ADMIN — FLUDROCORTISONE ACETATE 0.1 MILLIGRAM(S): 0.1 TABLET ORAL at 17:15

## 2022-01-01 RX ADMIN — PIPERACILLIN AND TAZOBACTAM 3.38 GRAM(S): 4; .5 INJECTION, POWDER, LYOPHILIZED, FOR SOLUTION INTRAVENOUS at 18:30

## 2022-01-01 RX ADMIN — SODIUM CHLORIDE 60 MILLILITER(S): 9 INJECTION, SOLUTION INTRAVENOUS at 15:03

## 2022-01-01 RX ADMIN — Medication 6 MILLIGRAM(S): at 05:43

## 2022-01-01 RX ADMIN — Medication 100 GRAM(S): at 06:08

## 2022-01-01 RX ADMIN — POLYETHYLENE GLYCOL 3350 17 GRAM(S): 17 POWDER, FOR SOLUTION ORAL at 15:05

## 2022-01-01 RX ADMIN — ZINC SULFATE TAB 220 MG (50 MG ZINC EQUIVALENT) 220 MILLIGRAM(S): 220 (50 ZN) TAB at 13:22

## 2022-01-01 RX ADMIN — POLYETHYLENE GLYCOL 3350 17 GRAM(S): 17 POWDER, FOR SOLUTION ORAL at 13:21

## 2022-01-01 RX ADMIN — REMDESIVIR 500 MILLIGRAM(S): 5 INJECTION INTRAVENOUS at 18:56

## 2022-01-01 RX ADMIN — PANTOPRAZOLE SODIUM 40 MILLIGRAM(S): 20 TABLET, DELAYED RELEASE ORAL at 05:58

## 2022-01-01 RX ADMIN — LATANOPROST 1 DROP(S): 0.05 SOLUTION/ DROPS OPHTHALMIC; TOPICAL at 22:56

## 2022-01-01 RX ADMIN — Medication 100 MILLIEQUIVALENT(S): at 14:58

## 2022-01-01 RX ADMIN — Medication 1000 MILLIGRAM(S): at 20:51

## 2022-01-03 NOTE — ED ADULT NURSE NOTE - OBJECTIVE STATEMENT
Pt presents to ED from home for constipation. As per patient's daughter, patient hasn't had a BM in 4 days despite colace and senna. Pt denies any current pain.

## 2022-01-03 NOTE — ED ADULT NURSE NOTE - NSIMPLEMENTINTERV_GEN_ALL_ED
Implemented All Fall with Harm Risk Interventions:  [place] to call system. Call bell, personal items and telephone within reach. Instruct patient to call for assistance. Room bathroom lighting operational. Non-slip footwear when patient is off stretcher. Physically safe environment: no spills, clutter or unnecessary equipment. Stretcher in lowest position, wheels locked, appropriate side rails in place. Provide visual cue, wrist band, yellow gown, etc. Monitor gait and stability. Monitor for mental status changes and reorient to person, place, and time. Review medications for side effects contributing to fall risk. Reinforce activity limits and safety measures with patient and family. Provide visual clues: red socks.

## 2022-01-03 NOTE — ED ADULT TRIAGE NOTE - CHIEF COMPLAINT QUOTE
She is constipated for 4 days snow. She takes softener but has no effect" She is constipated for 4 days now. She takes softener but has no effect" ISAR positive

## 2022-01-04 NOTE — ED PROVIDER NOTE - OBJECTIVE STATEMENT
pt and daughter states that she has not been able to have a full BM now for the last 3 days, today was sitting on the toilet for 2 hours but is not strong enough to push out the stool. denies any abd pain, fever or vomiting.

## 2022-01-04 NOTE — ED PROVIDER NOTE - NSFOLLOWUPINSTRUCTIONS_ED_ALL_ED_FT
-- Restart miralax daily.    -- Return to the ER for worsening or persistent symptoms, and/or ANY NEW OR CONCERNING SYMPTOMS.    -- If you have difficulty following up, please call: [phone] to obtain a [facility] doctor or specialist who takes your insurance in your area.

## 2022-01-04 NOTE — ED PROVIDER NOTE - PATIENT PORTAL LINK FT
You can access the FollowMyHealth Patient Portal offered by [facility] by registering at the following website: [email or web address]. By joining [employer]’s FollowMyHealth portal, you will also be able to view your health information using other applications (apps) compatible with our system.

## 2022-01-08 NOTE — CHART NOTE - NSCHARTNOTEFT_GEN_A_CORE
SW called Pt at home to discuss and assist with follow-up care. Pt did not answer. SW left a message for Pt encouraging Pt to call should Pt have further needs

## 2022-06-04 NOTE — PATIENT PROFILE ADULT - DO YOU FEEL UNSAFE AT SCHOOL?
You can access the FollowMyHealth Patient Portal offered by [facility] by registering at the following website: [email or web address]. By joining [employer]’s FollowMyHealth portal, you will also be able to view your health information using other applications (apps) compatible with our system. no

## 2022-07-10 NOTE — H&P ADULT - NSHPPHYSICALEXAM_GEN_ALL_CORE
Vital Signs Last 24 Hrs  T(C): 37.8 (10 Jul 2022 20:15), Max: 39.3 (10 Jul 2022 17:41)  T(F): 100 (10 Jul 2022 20:15), Max: 102.7 (10 Jul 2022 17:41)  HR: 78 (10 Jul 2022 22:10) (78 - 105)  BP: 126/75 (10 Jul 2022 22:10) (109/55 - 143/62)  BP(mean): 72 (10 Jul 2022 17:41) (72 - 72)  RR: 28 (10 Jul 2022 22:10) (18 - 37)  SpO2: 95% (10 Jul 2022 22:10) (78% - 99%)    Parameters below as of 10 Jul 2022 22:10  Patient On (Oxygen Delivery Method): nasal cannula  O2 Flow (L/min): 5    Daily Height in cm: 162.56 (10 Jul 2022 17:14)    Daily   CAPILLARY BLOOD GLUCOSE        I&O's Summary      GENERAL: cachetic, awake/alert AOx1  HEAD:  Normocephalic  EYES: EOMI, PERRLA, conjunctiva and sclera clear  ENMT: No tonsillar erythema, exudates, or enlargement; DRY mucous membranes, No lesions  NECK: Supple, No JVD, no bruit, normal thyroid  NERVOUS SYSTEM:  Alert & Oriented X1  grossly  moves all fours. unable to lift L arm at shoulder with noted L prox humerus deformity but no pain on raising of the shoulder.   CHEST/LUNG: Clear to auscultation bilaterally; No rales, rhonchi, wheezing, or rubs  HEART: Regular rate and rhythm; No murmurs, rubs, or gallops  ABDOMEN: Soft, Nontender, Nondistended; Bowel sounds present  EXTREMITIES:  2+ Peripheral Pulses, No clubbing, cyanosis, or edema  LYMPH: No lymphadenopathy noted  SKIN: No rashes or lesions. stage 2 sacral and buttock decub. dorsum L hand with about 2 inch lac s/p steristrip by ED and dressed. L posterior forearm with linear mild abrasion.
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
## 2022-07-10 NOTE — ED PROVIDER NOTE - CLINICAL SUMMARY MEDICAL DECISION MAKING FREE TEXT BOX
89 yo female with ams, fever, sepsis; patient was staying with family member; daughter picked her up and found patient to be sob, lethargic; o2 sat: 76% on RA; required my immediate response; sepsis protocol initiated; DNR/DNI per daughter- MOLST signed; re-eval closely

## 2022-07-10 NOTE — H&P ADULT - NSHPLABSRESULTS_GEN_ALL_CORE
14.7   8.72  )-----------( 215      ( 10 Jul 2022 17:45 )             47.0       10    151<H>  |  114<H>  |  43<H>  ----------------------------<  124<H>  3.8   |  30  |  1.40<H>    Ca    9.9      10 Jul 2022 19:55    TPro  6.9  /  Alb  2.4<L>  /  TBili  0.8  /  DBili  x   /  AST  60<H>  /  ALT  36  /  AlkPhos  55  07-10    Lactate, Blood: 4.2 mmol/L (07-10 @ 22:16)  Lactate, Blood: 3.3 mmol/L (07-10 @ 21:10)  Lactate, Blood: 5.5 mmol/L (07-10 @ 19:15)       LIVER FUNCTIONS - ( 10 Jul 2022 19:55 )  Alb: 2.4 g/dL / Pro: 6.9 g/dL / ALK PHOS: 55 U/L / ALT: 36 U/L / AST: 60 U/L / GGT: x               PT/INR - ( 10 Jul 2022 17:45 )   PT: 11.8 sec;   INR: 1.02 ratio         PTT - ( 10 Jul 2022 17:45 )  PTT:28.6 sec          Urinalysis Basic - ( 10 Jul 2022 23:00 )    Color: Yellow / Appearance: Clear / S.015 / pH: x  Gluc: x / Ketone: Negative  / Bili: Negative / Urobili: Negative   Blood: x / Protein: 100 / Nitrite: Positive   Leuk Esterase: Negative / RBC: 5-10 /HPF / WBC 0-2 /HPF   Sq Epi: x / Non Sq Epi: Neg.-Few / Bacteria: Few /HPF      EKG: NSR at 80bpm, LAD, PVC no acute St changes      CXR: wet read. personally rev. no overt infiltrate. L arm overlying obscuring L lower chest.     < from: CT Head No Cont (07.10.22 @ 19:42) >    IMPRESSION:    No acute intracranial hemorrhage, hydrocephalus or extra-axial fluid   collection.  Moderate parenchymal volume loss and moderate chronic microvascular   ischemic changes.  No CT evidence for acute transcortical infarction. Please note that MR   imaging is a more sensitive imaging modality for detection of acute   infarction and may be obtained as clinically warranted.    < end of copied text >

## 2022-07-10 NOTE — ED PROVIDER NOTE - OBJECTIVE STATEMENT
Patient is a 89 yo female with AMS; sob; patient has been at son's house for last month; picked up by daughter with confusion; ams- unknown onset; scraped her left hand when patient was walking out the door; patient's o2 sat: 76% on RA; required my immediate response.

## 2022-07-10 NOTE — H&P ADULT - ASSESSMENT
89F hx of mild dementia, autonomic dysfxn with orthostatic hypotension, glaucoma, hx of remote ovarian ca s/p TAHBSO/omental resection pw acute hypoxic respiratory failure sec to COVID 19 with severe sepsis with BRETT and hypernatremia.     #COVID 19 with acute hypoxic respiratory failure  cont supplemental oxygen.   not a candidate for IV Remdesivir sec to crcl: 16.3. consider tomorrow as creatinine improves.   Elevated lactate likely sec to severe dehydration.  proning as tolerated.   IV Decadron.   alb prn  vitamin C, D, zinc          CAPRINI SCORE [CLOT]    AGE RELATED RISK FACTORS                                                       MOBILITY RELATED FACTORS  [ ] Age 41-60 years                                            (1 Point)                  [ ] Bed rest                                                        (1 Point)  [ ] Age: 61-74 years                                           (2 Points)                 [ ] Plaster cast                                                   (2 Points)  [3 ] Age= 75 years                                              (3 Points)                 [ ] Bed bound for more than 72 hours                 (2 Points)    DISEASE RELATED RISK FACTORS                                               GENDER SPECIFIC FACTORS  [ ] Edema in the lower extremities                       (1 Point)                  [ ] Pregnancy                                                     (1 Point)  [ ] Varicose veins                                               (1 Point)                  [ ] Post-partum < 6 weeks                                   (1 Point)             [ ] BMI > 25 Kg/m2                                            (1 Point)                  [ ] Hormonal therapy  or oral contraception          (1 Point)                 [ ] Sepsis (in the previous month)                        (1 Point)                  [ ] History of pregnancy complications                 (1 point)  [ ] Pneumonia or serious lung disease                                               [ ] Unexplained or recurrent                     (1 Point)           (in the previous month)                               (1 Point)  [ ] Abnormal pulmonary function test                     (1 Point)                 SURGERY RELATED RISK FACTORS  [ ] Acute myocardial infarction                              (1 Point)                 [ ]  Section                                             (1 Point)  [ ] Congestive heart failure (in the previous month)  (1 Point)               [ ] Minor surgery                                                  (1 Point)   [ ] Inflammatory bowel disease                             (1 Point)                 [ ] Arthroscopic surgery                                        (2 Points)  [ ] Central venous access                                      (2 Points)                [ ] General surgery lasting more than 45 minutes   (2 Points)       [ ] Stroke (in the previous month)                          (5 Points)               [ ] Elective arthroplasty                                         (5 Points)                                                                                                                                               HEMATOLOGY RELATED FACTORS                                                 TRAUMA RELATED RISK FACTORS  [ ] Prior episodes of VTE                                     (3 Points)                [ ] Fracture of the hip, pelvis, or leg                       (5 Points)  [ ] Positive family history for VTE                         (3 Points)                 [ ] Acute spinal cord injury (in the previous month)  (5 Points)  [ ] Prothrombin 25576 A                                     (3 Points)                 [ ] Paralysis  (less than 1 month)                             (5 Points)  [ ] Factor V Leiden                                             (3 Points)                  [ ] Multiple Trauma within 1 month                        (5 Points)  [ ] Lupus anticoagulants                                     (3 Points)                                                           [ ] Anticardiolipin antibodies                               (3 Points)                                                       [ ] High homocysteine in the blood                      (3 Points)                                             [ ] Other congenital or acquired thrombophilia      (3 Points)                                                [ ] Heparin induced thrombocytopenia                  (3 Points)                                          Total Score [  3        ]    Caprini Score 0 - 2:  Low Risk, No VTE Prophylaxis required for most patients, encourage ambulation  Caprini Score 3 - 6:  At Risk, pharmacologic VTE prophylaxis is indicated for most patients (in the absence of a contraindication)  Caprini Score Greater than or = 7:  High Risk, pharmacologic VTE prophylaxis is indicated for most patients (in the absence of a contraindication)   89F hx of mild dementia, autonomic dysfxn with orthostatic hypotension, glaucoma, hx of remote ovarian ca s/p TAHBSO/omental resection pw acute hypoxic respiratory failure sec to COVID 19 with severe sepsis with BRETT and hypernatremia.     #COVID 19 with acute hypoxic respiratory failure  cont supplemental oxygen.   not a candidate for IV Remdesivir sec to crcl: 16.3. consider tomorrow as creatinine improves.   proning as tolerated.   IV Decadron.   alb prn  vitamin C, D, zinc  continuous pulse ox.   check routine COVID inflammatory markers including trop, d-dimer, BNP, procal, LDH, ferritin, CRP.   DVT proph per COVID guidelines. modify pending d-dimer   UA with +nitrite, but few bacteria and no wbc. s/p Vanco and Zosyn in ED .. hold additional antibxs for now. FU procal and urine cx results.    #BRETT and hypernatremia and persistent elev lactate likely sec to dehydration.  s/p sepsis fluids in ED and LR in ED.  IVFs with D5W, adjust fluids following Na    #GOC  As per ED, they already had extensive conversation with daughter. Pt is DNR/DNI. no BIPAP. Treat for infections.     Daughter Ruthann Zamora updated [phone] and requesting daily updates and she is unable to visit her mother as her  is recuperating as well.           CAPRINI SCORE [CLOT]    AGE RELATED RISK FACTORS                                                       MOBILITY RELATED FACTORS  [ ] Age 41-60 years                                            (1 Point)                  [ ] Bed rest                                                        (1 Point)  [ ] Age: 61-74 years                                           (2 Points)                 [ ] Plaster cast                                                   (2 Points)  [3 ] Age= 75 years                                              (3 Points)                 [ ] Bed bound for more than 72 hours                 (2 Points)    DISEASE RELATED RISK FACTORS                                               GENDER SPECIFIC FACTORS  [ ] Edema in the lower extremities                       (1 Point)                  [ ] Pregnancy                                                     (1 Point)  [ ] Varicose veins                                               (1 Point)                  [ ] Post-partum < 6 weeks                                   (1 Point)             [ ] BMI > 25 Kg/m2                                            (1 Point)                  [ ] Hormonal therapy  or oral contraception          (1 Point)                 [ ] Sepsis (in the previous month)                        (1 Point)                  [ ] History of pregnancy complications                 (1 point)  [ ] Pneumonia or serious lung disease                                               [ ] Unexplained or recurrent                     (1 Point)           (in the previous month)                               (1 Point)  [ ] Abnormal pulmonary function test                     (1 Point)                 SURGERY RELATED RISK FACTORS  [ ] Acute myocardial infarction                              (1 Point)                 [ ]  Section                                             (1 Point)  [ ] Congestive heart failure (in the previous month)  (1 Point)               [ ] Minor surgery                                                  (1 Point)   [ ] Inflammatory bowel disease                             (1 Point)                 [ ] Arthroscopic surgery                                        (2 Points)  [ ] Central venous access                                      (2 Points)                [ ] General surgery lasting more than 45 minutes   (2 Points)       [ ] Stroke (in the previous month)                          (5 Points)               [ ] Elective arthroplasty                                         (5 Points)                                                                                                                                               HEMATOLOGY RELATED FACTORS                                                 TRAUMA RELATED RISK FACTORS  [ ] Prior episodes of VTE                                     (3 Points)                [ ] Fracture of the hip, pelvis, or leg                       (5 Points)  [ ] Positive family history for VTE                         (3 Points)                 [ ] Acute spinal cord injury (in the previous month)  (5 Points)  [ ] Prothrombin 42312 A                                     (3 Points)                 [ ] Paralysis  (less than 1 month)                             (5 Points)  [ ] Factor V Leiden                                             (3 Points)                  [ ] Multiple Trauma within 1 month                        (5 Points)  [ ] Lupus anticoagulants                                     (3 Points)                                                           [ ] Anticardiolipin antibodies                               (3 Points)                                                       [ ] High homocysteine in the blood                      (3 Points)                                             [ ] Other congenital or acquired thrombophilia      (3 Points)                                                [ ] Heparin induced thrombocytopenia                  (3 Points)                                          Total Score [  3        ]    Caprini Score 0 - 2:  Low Risk, No VTE Prophylaxis required for most patients, encourage ambulation  Caprini Score 3 - 6:  At Risk, pharmacologic VTE prophylaxis is indicated for most patients (in the absence of a contraindication)  Caprini Score Greater than or = 7:  High Risk, pharmacologic VTE prophylaxis is indicated for most patients (in the absence of a contraindication)   89F hx of mild dementia, autonomic dysfxn with orthostatic hypotension, glaucoma, hx of remote ovarian ca s/p TAHBSO/omental resection pw acute hypoxic respiratory failure sec to COVID 19 with severe sepsis with BRETT and hypernatremia.     #COVID 19 with acute hypoxic respiratory failure  cont supplemental oxygen.   not a candidate for IV Remdesivir sec to crcl: 16.3. consider tomorrow as creatinine improves.   proning as tolerated.   IV Decadron.   alb prn  vitamin C, D, zinc  continuous pulse ox.   check routine COVID inflammatory markers including trop, d-dimer, BNP, procal, LDH, ferritin, CRP.   DVT proph per COVID guidelines. modify pending d-dimer   UA with +nitrite, but few bacteria and no wbc. s/p Vanco and Zosyn in ED .. hold additional antibxs for now. FU procal and urine cx results.    #BRETT and hypernatremia and persistent elev lactate likely sec to dehydration.  s/p sepsis fluids in ED and LR in ED.  IVFs with D5W, adjust fluids following Na    #Orthostatic hypotension/Autonomic dysfunction.  cont fludrocortisone and midodrine.     #GOC  As per ED, they already had extensive conversation with daughter. Pt is DNR/DNI. no BIPAP. Treat for infections.     Daughter Ruthann Zamora updated [phone] and requesting daily updates and she is unable to visit her mother as her  is recuperating as well.           CAPRINI SCORE [CLOT]    AGE RELATED RISK FACTORS                                                       MOBILITY RELATED FACTORS  [ ] Age 41-60 years                                            (1 Point)                  [ ] Bed rest                                                        (1 Point)  [ ] Age: 61-74 years                                           (2 Points)                 [ ] Plaster cast                                                   (2 Points)  [3 ] Age= 75 years                                              (3 Points)                 [ ] Bed bound for more than 72 hours                 (2 Points)    DISEASE RELATED RISK FACTORS                                               GENDER SPECIFIC FACTORS  [ ] Edema in the lower extremities                       (1 Point)                  [ ] Pregnancy                                                     (1 Point)  [ ] Varicose veins                                               (1 Point)                  [ ] Post-partum < 6 weeks                                   (1 Point)             [ ] BMI > 25 Kg/m2                                            (1 Point)                  [ ] Hormonal therapy  or oral contraception          (1 Point)                 [ ] Sepsis (in the previous month)                        (1 Point)                  [ ] History of pregnancy complications                 (1 point)  [ ] Pneumonia or serious lung disease                                               [ ] Unexplained or recurrent                     (1 Point)           (in the previous month)                               (1 Point)  [ ] Abnormal pulmonary function test                     (1 Point)                 SURGERY RELATED RISK FACTORS  [ ] Acute myocardial infarction                              (1 Point)                 [ ]  Section                                             (1 Point)  [ ] Congestive heart failure (in the previous month)  (1 Point)               [ ] Minor surgery                                                  (1 Point)   [ ] Inflammatory bowel disease                             (1 Point)                 [ ] Arthroscopic surgery                                        (2 Points)  [ ] Central venous access                                      (2 Points)                [ ] General surgery lasting more than 45 minutes   (2 Points)       [ ] Stroke (in the previous month)                          (5 Points)               [ ] Elective arthroplasty                                         (5 Points)                                                                                                                                               HEMATOLOGY RELATED FACTORS                                                 TRAUMA RELATED RISK FACTORS  [ ] Prior episodes of VTE                                     (3 Points)                [ ] Fracture of the hip, pelvis, or leg                       (5 Points)  [ ] Positive family history for VTE                         (3 Points)                 [ ] Acute spinal cord injury (in the previous month)  (5 Points)  [ ] Prothrombin 32351 A                                     (3 Points)                 [ ] Paralysis  (less than 1 month)                             (5 Points)  [ ] Factor V Leiden                                             (3 Points)                  [ ] Multiple Trauma within 1 month                        (5 Points)  [ ] Lupus anticoagulants                                     (3 Points)                                                           [ ] Anticardiolipin antibodies                               (3 Points)                                                       [ ] High homocysteine in the blood                      (3 Points)                                             [ ] Other congenital or acquired thrombophilia      (3 Points)                                                [ ] Heparin induced thrombocytopenia                  (3 Points)                                          Total Score [  3        ]    Caprini Score 0 - 2:  Low Risk, No VTE Prophylaxis required for most patients, encourage ambulation  Caprini Score 3 - 6:  At Risk, pharmacologic VTE prophylaxis is indicated for most patients (in the absence of a contraindication)  Caprini Score Greater than or = 7:  High Risk, pharmacologic VTE prophylaxis is indicated for most patients (in the absence of a contraindication)

## 2022-07-10 NOTE — ED ADULT NURSE NOTE - OBJECTIVE STATEMENT
Pt BIB dtr for laceration to her left hand, confusion, lethargy, shortness of breath.  Pt placed on 100% for O2 sats 80s on room air.  Mother was in the care of her brother for 1 month, dtr just picked her up today.

## 2022-07-10 NOTE — ED PROVIDER NOTE - PROGRESS NOTE DETAILS
Katiuska WHITE:  I, Kim Harp DO,  performed the initial face to face bedside interview with this patient regarding history of present illness, review of symptoms and relevant past medical, social and family history.  I completed an independent physical examination.  I was the initial provider who evaluated this patient.   I personally saw the patient and performed a substantive portion of the visit including all aspects of the medical decision making.  I have signed out the follow up of any pending tests (i.e. labs, radiological studies) to the ACP.  I have communicated the patient’s plan of care and disposition with the ACP.  The history, relevant review of systems, past medical and surgical history, medical decision making, and physical examination was documented by the scribe in my presence and I attest to the accuracy of the documentation. Katiuska DO: S/o to Dr. Olivares pending admission at this time. spoke to daughter, Ruthann Zamora [phone] - obtained verbal consent for DNR/DNI

## 2022-07-11 NOTE — PATIENT PROFILE ADULT - FALL HARM RISK - HARM RISK INTERVENTIONS
Assistance with ambulation/Assistance OOB with selected safe patient handling equipment/Communicate Risk of Fall with Harm to all staff/Discuss with provider need for PT consult/Monitor gait and stability/Reinforce activity limits and safety measures with patient and family/Tailored Fall Risk Interventions/Visual Cue: Yellow wristband and red socks/Bed in lowest position, wheels locked, appropriate side rails in place/Call bell, personal items and telephone in reach/Instruct patient to call for assistance before getting out of bed or chair/Non-slip footwear when patient is out of bed/[facility] to call system/Physically safe environment - no spills, clutter or unnecessary equipment/Purposeful Proactive Rounding/Room/bathroom lighting operational, light cord in reach

## 2022-07-11 NOTE — PROGRESS NOTE ADULT - ASSESSMENT
chart reviewed and patient examined...admitted lethargy hypernatremia and covid positive.....remdesivir held secondary low crcl..continue IV decadron...ID consulted..await culturesbefore considering more antibiotics

## 2022-07-11 NOTE — SWALLOW BEDSIDE ASSESSMENT ADULT - SLP PERTINENT HISTORY OF CURRENT PROBLEM
89F hx of mild dementia, autonomic dysfxn with orthostatic hypotension, glaucoma, hx of remote ovarian ca s/p TAHBSO/omental resection pw acute hypoxic respiratory failure and L hand lacerations. Hx obtained from daughter Ruthann Zamora [phone]. Pt has been living with her son for the past month while daughter is caring for  who is recuperating from hip sx. Daughter picked up pt today and noted pt with severe weakness, increased confusion from baseline and as pt was transported to daughter's car somehow sustained lacerations to the L hand and L forearm and daughter bought pt to ED. Son had reported to daughter that pt had a mild cough. In ED pt febrile to 102.7 tachy to 105 BP:109/55 sat 78%on RA and then 99% on NRB. WBC: 8.72 Lact: 5.5 Na: 151 C: 114 BUN/cr: 43/1.4 (baseline cr: 0.88) CT head neg. COVID +. UA pending. Pt currently more alert and responding to some questions and now sat 95% on 4LNC. s/p sepsis fluids and IV Vanco and Zosyn in ED.

## 2022-07-11 NOTE — CONSULT NOTE ADULT - ASSESSMENT
89y female with mild dementia, autonomic dysfxn with orthostatic hypotension, glaucoma, hx of remote ovarian ca s/p TAHBSO/omental resection pw acute hypoxic respiratory failure and L hand lacerations. Hx obtained from daughter Ruthann Zamora [phone]. Pt has been living with her son for the past month while daughter is caring for  who is recuperating from hip sx. As per the chart notes the patient was noted to have  with severe weakness, increased confusion from baseline and as pt was transported to daughter's car somehow sustained lacerations to the L hand and L forearm and daughter bought pt to ED. The patient was tested for COVID 19 and she tested positive. ID consulted for further management.  VS reviewed and in the ED she was found to have fever and leukocytosis noted todays labs. reviewed labs noted that her cr did improve, I believe her treatment for COVID is most important and we can continue to monitor daily cr. will start RDV for the treatment of COVID    Plan   Start RDV IV as per protocol for the treatment of COVID  continue to monitor daily labs and cr  continue supportive care per medicine team

## 2022-07-11 NOTE — DIETITIAN INITIAL EVALUATION ADULT - PHYSICAL ASSESSMENT TRICEPS
HSAT Returned - [facility]    Date of Study: 11/19/18    Study log in media
HSAT demonstrated sleep disordered breathing characterized by an AHI of 21.2/h associated with minimal SaO2 of 76%. Snoring during 28.8% of the recording. Recommendation: APAP 7-15 cm. Chief technologist: Please review the HSAT results with the patient. An order has been generated for APAP 7-15 cm. Please schedule first compliance appointment.
Results were sent to patient via [employer] message. The patient was instructed to call or reply to e-mail with questions. Fax DME order & Schedule 1st adherence visit in 60 to 90 days.
severe

## 2022-07-11 NOTE — CONSULT NOTE ADULT - SUBJECTIVE AND OBJECTIVE BOX
MULU PATEL  [id number]      HPI:    Mulu Patel is a 89 year old woman with past medical history of Mild dementia, Autonomic dysfunction with orthostatic hypotension and remote history of Ovarian cancer (s/p TAHBSO/omental resection) who was brought in to hospital due to weakness, increased confusion and cough, found to have COVID-19.       ALLERGIES:  No Known Allergies      PAST MEDICAL & SURGICAL HISTORY:  Mild dementia  Autonomic dysfunction with orthostatic hypotension  Remote history of Ovarian cancer (s/p TAHBSO/omental resection)  Depression  Glaucoma  Bladder dysfunction    CURRENT MEDICATIONS:  acetaminophen     Tablet .. 650 milliGRAM(s) Oral every 6 hours PRN  ALBUTerol    90 MICROgram(s) HFA Inhaler 1 Puff(s) Inhalation every 4 hours PRN  ascorbic acid 500 milliGRAM(s) Oral daily  cholecalciferol 2000 Unit(s) Oral daily  dexAMETHasone  Injectable 6 milliGRAM(s) IV Push daily  dextrose 5%. 1000 milliLiter(s) IV Continuous <Continuous>  fludroCORTISONE 0.1 milliGRAM(s) Oral two times a day  heparin   Injectable 5000 Unit(s) SubCutaneous every 8 hours  lactated ringers 1000 milliLiter(s) IV Continuous <Continuous>  latanoprost 0.005% Ophthalmic Solution 1 Drop(s) Both EYES at bedtime  midodrine. 10 milliGRAM(s) Oral three times a day  multivitamin 1 Tablet(s) Oral daily  pantoprazole    Tablet 40 milliGRAM(s) Oral before breakfast  polyethylene glycol 3350 17 Gram(s) Oral daily  potassium chloride  10 mEq/100 mL IVPB 10 milliEquivalent(s) IV Intermittent every 1 hour  senna 2 Tablet(s) Oral at bedtime  zinc sulfate 220 milliGRAM(s) Oral daily      FAMILY HISTORY:  FH: prostate cancer          ROS:  All 10 systems reviewed and positives noted in HPI    OBJECTIVE:    VITAL SIGNS:  Vital Signs Last 24 Hrs  T(C): 36.1 (11 Jul 2022 05:00), Max: 39.3 (10 Jul 2022 17:41)  T(F): 97 (11 Jul 2022 05:00), Max: 102.7 (10 Jul 2022 17:41)  HR: 68 (11 Jul 2022 05:00) (68 - 105)  BP: 131/64 (11 Jul 2022 05:00) (109/55 - 143/62)  BP(mean): 72 (10 Jul 2022 17:41) (72 - 72)  RR: 16 (11 Jul 2022 05:00) (16 - 37)  SpO2: 97% (11 Jul 2022 05:00) (78% - 99%)      PHYSICAL EXAM:  General: well appearing, no distress  HEENT: sclera anicteric  Neck: supple, no carotid bruits b/l  CVS: JVP ~ 7 cm H20, RRR, s1, s2, no murmurs/rubs/gallops  Chest: unlabored respirations, clear to auscultation b/l  Abdomen: non-distended  Extremities: no lower extremity edema b/l  Neuro: awake, alert & oriented x 3  Psych: normal affect      LABS:                        12.4   14.51 )-----------( 146      ( 11 Jul 2022 07:30 )             39.3     07-11    150<H>  |  113<H>  |  31<H>  ----------------------------<  163<H>  3.1<L>   |  27  |  1.08    Ca    9.6      11 Jul 2022 07:30  Mg     1.8     07-11    TPro  6.9  /  Alb  2.2<L>  /  TBili  0.6  /  DBili  x   /  AST  60<H>  /  ALT  34  /  AlkPhos  53  07-11        PT/INR - ( 10 Jul 2022 17:45 )   PT: 11.8 sec;   INR: 1.02 ratio         PTT - ( 10 Jul 2022 17:45 )  PTT:28.6 sec        ECG (7/10/22): sinus rhythm, PACs, bifascicular block, anteroseptal and lateral T wave inversions/abnormalities (RBBB and ST changes appears new)     CT Head (7/10/22):  No acute intracranial hemorrhage, hydrocephalus or extra-axial fluid   collection. Moderate parenchymal volume loss and moderate chronic microvascular ischemic changes. No CT evidence for acute transcortical infarction. Please note that MR  imaging is a more sensitive imaging modality for detection of acute  infarction and may be obtained as clinically warranted.        No prior cardiac workup in chart   MULU PATEL  [id number]      HPI:    Mulu Patel is a 89 year old woman with past medical history of Mild dementia, Autonomic dysfunction with orthostatic hypotension and remote history of Ovarian cancer (s/p TAHBSO/omental resection) who was brought in to hospital due to weakness, increased confusion and cough, found to have COVID-19.     ALLERGIES:  No Known Allergies      PAST MEDICAL & SURGICAL HISTORY:  Mild dementia  Autonomic dysfunction with orthostatic hypotension  Remote history of Ovarian cancer (s/p TAHBSO/omental resection)  Depression  Glaucoma  Bladder dysfunction    CURRENT MEDICATIONS:  acetaminophen     Tablet .. 650 milliGRAM(s) Oral every 6 hours PRN  ALBUTerol    90 MICROgram(s) HFA Inhaler 1 Puff(s) Inhalation every 4 hours PRN  ascorbic acid 500 milliGRAM(s) Oral daily  cholecalciferol 2000 Unit(s) Oral daily  dexAMETHasone  Injectable 6 milliGRAM(s) IV Push daily  dextrose 5%. 1000 milliLiter(s) IV Continuous <Continuous>  fludroCORTISONE 0.1 milliGRAM(s) Oral two times a day  heparin   Injectable 5000 Unit(s) SubCutaneous every 8 hours  lactated ringers 1000 milliLiter(s) IV Continuous <Continuous>  latanoprost 0.005% Ophthalmic Solution 1 Drop(s) Both EYES at bedtime  midodrine. 10 milliGRAM(s) Oral three times a day  multivitamin 1 Tablet(s) Oral daily  pantoprazole    Tablet 40 milliGRAM(s) Oral before breakfast  polyethylene glycol 3350 17 Gram(s) Oral daily  potassium chloride  10 mEq/100 mL IVPB 10 milliEquivalent(s) IV Intermittent every 1 hour  senna 2 Tablet(s) Oral at bedtime  zinc sulfate 220 milliGRAM(s) Oral daily      FAMILY HISTORY:  FH: prostate cancer      ROS:  All 10 systems reviewed and positives noted in HPI    OBJECTIVE:    VITAL SIGNS:  Vital Signs Last 24 Hrs  T(C): 36.1 (11 Jul 2022 05:00), Max: 39.3 (10 Jul 2022 17:41)  T(F): 97 (11 Jul 2022 05:00), Max: 102.7 (10 Jul 2022 17:41)  HR: 68 (11 Jul 2022 05:00) (68 - 105)  BP: 131/64 (11 Jul 2022 05:00) (109/55 - 143/62)  BP(mean): 72 (10 Jul 2022 17:41) (72 - 72)  RR: 16 (11 Jul 2022 05:00) (16 - 37)  SpO2: 97% (11 Jul 2022 05:00) (78% - 99%)      PHYSICAL EXAM:  General: elderly, frail woman, altered  HEENT: sclera anicteric  Neck: supple  CVS: JVP ~ 7 cm H20, RRR, s1, s2, no murmurs/rubs/gallops  Chest: unlabored respirations, clear to auscultation b/l  Extremities: no lower extremity edema b/l  Neuro: awake, alert, not oriented       LABS:                        12.4   14.51 )-----------( 146      ( 11 Jul 2022 07:30 )             39.3     07-11    150<H>  |  113<H>  |  31<H>  ----------------------------<  163<H>  3.1<L>   |  27  |  1.08    Ca    9.6      11 Jul 2022 07:30  Mg     1.8     07-11    TPro  6.9  /  Alb  2.2<L>  /  TBili  0.6  /  DBili  x   /  AST  60<H>  /  ALT  34  /  AlkPhos  53  07-11        PT/INR - ( 10 Jul 2022 17:45 )   PT: 11.8 sec;   INR: 1.02 ratio         PTT - ( 10 Jul 2022 17:45 )  PTT:28.6 sec        ECG (7/10/22): sinus rhythm, PACs, bifascicular block, anteroseptal and lateral T wave inversions/abnormalities (RBBB and ST changes appears new)     CT Head (7/10/22):  No acute intracranial hemorrhage, hydrocephalus or extra-axial fluid   collection. Moderate parenchymal volume loss and moderate chronic microvascular ischemic changes. No CT evidence for acute transcortical infarction. Please note that MR  imaging is a more sensitive imaging modality for detection of acute  infarction and may be obtained as clinically warranted.      No prior cardiac workup in chart

## 2022-07-11 NOTE — SWALLOW BEDSIDE ASSESSMENT ADULT - COMMENTS
7/11 WBC: 14.51    7/10 CTH: No acute intracranial hemorrhage, hydrocephalus or extra-axial fluid collection. Moderate parenchymal volume loss and moderate chronic microvascular ischemic changes. No CT evidence for acute transcortical infarction. Please note that MR imaging is a more sensitive imaging modality for detection of acute infarction and may be obtained as clinically warranted.

## 2022-07-11 NOTE — SWALLOW BEDSIDE ASSESSMENT ADULT - PHARYNGEAL PHASE
Delayed pharyngeal swallow/Decreased laryngeal elevation/Wet vocal quality post oral intake/Cough post oral intake Delayed pharyngeal swallow/Decreased laryngeal elevation

## 2022-07-11 NOTE — CONSULT NOTE ADULT - SUBJECTIVE AND OBJECTIVE BOX
HPI:  89F hx of mild dementia, autonomic dysfxn with orthostatic hypotension, glaucoma, hx of remote ovarian ca s/p TAHBSO/omental resection pw acute hypoxic respiratory failure and L hand lacerations. Hx obtained from daughter Ruthann Zamora [phone]. Pt has been living with her son for the past month while daughter is caring for  who is recuperating from hip sx. Daughter picked up pt today and noted pt with severe weakness, increased confusion from baseline and as pt was transported to daughter's car somehow sustained lacerations to the L hand and L forearm and daughter bought pt to ED. Son had reported to daughter that pt had a mild cough. In ED pt febrile to 102.7 tachy to 105 BP:109/55 sat 78%on RA and then 99% on NRB. WBC: 8.72 Lact: 5.5 Na: 151 C: 114 BUN/cr: 43/1.4 (baseline cr: 0.88) CT head neg. COVID +. UA pending. Pt currently more alert and responding to some questions and now sat 95% on 4LNC. s/p sepsis fluids and IV Vanco and Zosyn in ED.  (10 Jul 2022 23:39)  Palliative:  Chart reviewed   MOLST on chart    PAST MEDICAL & SURGICAL HISTORY:  Hypotension      Depression      Glaucoma      Bladder dysfunction      Malignant neoplasm of ovary, unspecified laterality      H/O: hysterectomy          SOCIAL HISTORY:    Admitted from:  home  Substance abuse history:  no apparent            Tobacco hx:                  Alcohol hx:              Home Opioid hx:  Yazidi:                                    Preferred Language:    Surrogate/HCP/Guardian:   Ruthann Dixon         Phone#: [phone]    FAMILY HISTORY:  FH: prostate cancer  father      Baseline ADLs (prior to admission):  Generalized weakess.  Daughter called to review     Allergies    No Known Allergies    Intolerances      Present Symptoms:   Dyspnea: n  Nausea/Vomiting: n  Anxiety:n  Depressed n  Fatigue:n  Loss of appetite: n  Pain:       no apparent                         location:          Review of Systems: r Unable to obtain due to poor mentation]    MEDICATIONS  (STANDING):  ascorbic acid 500 milliGRAM(s) Oral daily  cholecalciferol 2000 Unit(s) Oral daily  dexAMETHasone  Injectable 6 milliGRAM(s) IV Push daily  dextrose 5%. 1000 milliLiter(s) (60 mL/Hr) IV Continuous <Continuous>  fludroCORTISONE 0.1 milliGRAM(s) Oral two times a day  heparin   Injectable 5000 Unit(s) SubCutaneous every 8 hours  lactated ringers 1000 milliLiter(s) (1000 mL/Hr) IV Continuous <Continuous>  latanoprost 0.005% Ophthalmic Solution 1 Drop(s) Both EYES at bedtime  midodrine. 10 milliGRAM(s) Oral three times a day  multivitamin 1 Tablet(s) Oral daily  pantoprazole    Tablet 40 milliGRAM(s) Oral before breakfast  polyethylene glycol 3350 17 Gram(s) Oral daily  potassium chloride  10 mEq/100 mL IVPB 10 milliEquivalent(s) IV Intermittent every 1 hour  senna 2 Tablet(s) Oral at bedtime  zinc sulfate 220 milliGRAM(s) Oral daily    MEDICATIONS  (PRN):  acetaminophen     Tablet .. 650 milliGRAM(s) Oral every 6 hours PRN Temp greater or equal to 38C (100.4F), Mild Pain (1 - 3)  ALBUTerol    90 MICROgram(s) HFA Inhaler 1 Puff(s) Inhalation every 4 hours PRN Bronchospasm      PHYSICAL EXAM:    Vital Signs Last 24 Hrs  T(C): 36.1 (2022 05:00), Max: 39.3 (10 Jul 2022 17:41)  T(F): 97 (2022 05:00), Max: 102.7 (10 Jul 2022 17:41)  HR: 68 (2022 05:00) (68 - 105)  BP: 131/64 (2022 05:00) (109/55 - 143/62)  BP(mean): 72 (10 Jul 2022 17:41) (72 - 72)  RR: 16 (2022 05:00) (16 - 37)  SpO2: 97% (2022 05:00) (78% - 99%)    Parameters below as of 2022 05:00  Patient On (Oxygen Delivery Method): nasal cannula  O2 Flow (L/min): 4      General: alert  oriented x ____1    [lethargic)  HEENT: normal   Lungs: comfortable   CV: normal   GI: normal                : normal   Musculoskeletal:  weakness   Skin: normal   Neuro:  cognitive impairment   Oral intake ability: TBD      LABS:                        12.4   14.51 )-----------( 146      ( 2022 07:30 )             39.3     07-11    150<H>  |  113<H>  |  31<H>  ----------------------------<  163<H>  3.1<L>   |  27  |  1.08    Ca    9.6      2022 07:30  Mg     1.8         TPro  6.9  /  Alb  2.2<L>  /  TBili  0.6  /  DBili  x   /  AST  60<H>  /  ALT  34  /  AlkPhos  53      Urinalysis Basic - ( 10 Jul 2022 23:00 )    Color: Yellow / Appearance: Clear / S.015 / pH: x  Gluc: x / Ketone: Negative  / Bili: Negative / Urobili: Negative   Blood: x / Protein: 100 / Nitrite: Positive   Leuk Esterase: Negative / RBC: 5-10 /HPF / WBC 0-2 /HPF   Sq Epi: x / Non Sq Epi: Neg.-Few / Bacteria: Few /HPF        RADIOLOGY & ADDITIONAL STUDIES:    ADVANCE DIRECTIVES:   Advanced Care Planning discussion total time spent:

## 2022-07-11 NOTE — SWALLOW BEDSIDE ASSESSMENT ADULT - SLP GENERAL OBSERVATIONS
Patient with fluctuating levels of alertness, and required frequent verbal and tactile arouse to maintain task orientation. Oriented to self. Patient on 5L O2 via NC with head tilted on the left side. Repositioned to upright positioning; however, continued to present with poor head neck position. Clinician provided manual support on the head in order to accept PO trials provided.

## 2022-07-11 NOTE — DIETITIAN INITIAL EVALUATION ADULT - SIGNS/SYMPTOMS
report of pressure injury at sacrum; Pressure Injury Stage II Visible and physical assessment of severe loss of muscle mass and subcutaneous fat

## 2022-07-11 NOTE — DIETITIAN INITIAL EVALUATION ADULT - PERTINENT LABORATORY DATA
07-11    150<H>  |  113<H>  |  31<H>  ----------------------------<  163<H>  3.1<L>   |  27  |  1.08    Ca    9.6      11 Jul 2022 07:30  Mg     1.8     07-11    TPro  6.9  /  Alb  2.2<L>  /  TBili  0.6  /  DBili  x   /  AST  60<H>  /  ALT  34  /  AlkPhos  53  07-11

## 2022-07-11 NOTE — DIETITIAN INITIAL EVALUATION ADULT - PERTINENT MEDS FT
MEDICATIONS  (STANDING):  ascorbic acid 500 milliGRAM(s) Oral daily  cholecalciferol 2000 Unit(s) Oral daily  dexAMETHasone  Injectable 6 milliGRAM(s) IV Push daily  dextrose 5%. 1000 milliLiter(s) (60 mL/Hr) IV Continuous <Continuous>  fludroCORTISONE 0.1 milliGRAM(s) Oral two times a day  heparin   Injectable 5000 Unit(s) SubCutaneous every 8 hours  lactated ringers 1000 milliLiter(s) (1000 mL/Hr) IV Continuous <Continuous>  latanoprost 0.005% Ophthalmic Solution 1 Drop(s) Both EYES at bedtime  midodrine. 10 milliGRAM(s) Oral three times a day  multivitamin 1 Tablet(s) Oral daily  pantoprazole    Tablet 40 milliGRAM(s) Oral before breakfast  polyethylene glycol 3350 17 Gram(s) Oral daily  potassium chloride    Tablet ER 40 milliEquivalent(s) Oral every 4 hours  senna 2 Tablet(s) Oral at bedtime  zinc sulfate 220 milliGRAM(s) Oral daily    MEDICATIONS  (PRN):  acetaminophen     Tablet .. 650 milliGRAM(s) Oral every 6 hours PRN Temp greater or equal to 38C (100.4F), Mild Pain (1 - 3)  ALBUTerol    90 MICROgram(s) HFA Inhaler 1 Puff(s) Inhalation every 4 hours PRN Bronchospasm

## 2022-07-11 NOTE — DIETITIAN INITIAL EVALUATION ADULT - ORAL INTAKE PTA/DIET HISTORY
Meet with patient at bedside, unable to obtain diet history at this time 2/2 patient w/ dementia and lethargy. Current diet order NPO. Severe malnutrition findings of muscle wasting and subcutaneous fat loss during physical assessment. Patient with low current BW 89.9 and BMI (15.4). Per medical record Patient with dehydration and on IV fluids.

## 2022-07-11 NOTE — CONSULT NOTE ADULT - SUBJECTIVE AND OBJECTIVE BOX
HPI:   Patient is a 89y female with mild dementia, autonomic dysfxn with orthostatic hypotension, glaucoma, hx of remote ovarian ca s/p TAHBSO/omental resection pw acute hypoxic respiratory failure and L hand lacerations. Hx obtained from daughter Ruthann Zamora [phone]. Pt has been living with her son for the past month while daughter is caring for  who is recuperating from hip sx. As per the chart notes the patient was noted to have  with severe weakness, increased confusion from baseline and as pt was transported to daughter's car somehow sustained lacerations to the L hand and L forearm and daughter bought pt to ED. The patient was tested for COVID 19 and she tested positive. ID consulted for further management.  VS reviewed and in the ED she was found to have fever and leukocytosis noted todays labs.     REVIEW OF SYSTEMS:  All other review of systems negative (Comprehensive ROS)    PAST MEDICAL & SURGICAL HISTORY:  Hypotension      Depression      Glaucoma      Bladder dysfunction      Malignant neoplasm of ovary, unspecified laterality      H/O: hysterectomy          Allergies    No Known Allergies    Intolerances            FAMILY HISTORY:  FH: prostate cancer  father        SOCIAL HISTORY:  Smoking:     ETOH:     Drug Use:     Single     T(F): 97 (22 @ 05:00), Max: 102.7 (07-10-22 @ 17:41)  HR: 64 (22 @ 12:52)  BP: 137/44 (22 @ 12:52)  RR: 16 (22 @ 05:00)  SpO2: 97% (22 @ 05:00)  Wt(kg): --    PHYSICAL EXAM:  General: she appears chronically ill   Eyes:  anicteric, no conjunctival injection, no discharge  Oropharynx: no lesions or injection 	  Neck: supple, without adenopathy  Lungs: +BS  Heart: regular rate and rhythm; no murmur, rubs or gallops  Abdomen: soft, nondistended, nontender, without mass or organomegaly  Skin: no lesions  Extremities: no clubbing, cyanosis, or edema  Neurologic: lethargic , she arouses when calling her name     LAB RESULTS:                        12.4   14.51 )-----------( 146      ( 2022 07:30 )             39.3     07-11    150<H>  |  113<H>  |  31<H>  ----------------------------<  163<H>  3.1<L>   |  27  |  1.08    Ca    9.6      2022 07:30  Mg     1.8     07-11    TPro  6.9  /  Alb  2.2<L>  /  TBili  0.6  /  DBili  x   /  AST  60<H>  /  ALT  34  /  AlkPhos  53  07-11    LIVER FUNCTIONS - ( 2022 07:30 )  Alb: 2.2 g/dL / Pro: 6.9 g/dL / ALK PHOS: 53 U/L / ALT: 34 U/L / AST: 60 U/L / GGT: x           Urinalysis Basic - ( 10 Jul 2022 23:00 )    Color: Yellow / Appearance: Clear / S.015 / pH: x  Gluc: x / Ketone: Negative  / Bili: Negative / Urobili: Negative   Blood: x / Protein: 100 / Nitrite: Positive   Leuk Esterase: Negative / RBC: 5-10 /HPF / WBC 0-2 /HPF   Sq Epi: x / Non Sq Epi: Neg.-Few / Bacteria: Few /HPF        MICROBIOLOGY:  RECENT CULTURES:        RADIOLOGY REVIEWED:      Antimicrobials Day #    remdesivir  IVPB   IV Intermittent     Other Medications:  acetaminophen     Tablet .. 650 milliGRAM(s) Oral every 6 hours PRN  ALBUTerol    90 MICROgram(s) HFA Inhaler 1 Puff(s) Inhalation every 4 hours PRN  ascorbic acid 500 milliGRAM(s) Oral daily  cholecalciferol 2000 Unit(s) Oral daily  dexAMETHasone  Injectable 6 milliGRAM(s) IV Push daily  dextrose 5%. 1000 milliLiter(s) IV Continuous <Continuous>  fludroCORTISONE 0.1 milliGRAM(s) Oral two times a day  heparin   Injectable 5000 Unit(s) SubCutaneous every 8 hours  lactated ringers 1000 milliLiter(s) IV Continuous <Continuous>  latanoprost 0.005% Ophthalmic Solution 1 Drop(s) Both EYES at bedtime  midodrine. 10 milliGRAM(s) Oral three times a day  multivitamin 1 Tablet(s) Oral daily  pantoprazole    Tablet 40 milliGRAM(s) Oral before breakfast  polyethylene glycol 3350 17 Gram(s) Oral daily  potassium chloride  10 mEq/100 mL IVPB 10 milliEquivalent(s) IV Intermittent every 1 hour  senna 2 Tablet(s) Oral at bedtime  zinc sulfate 220 milliGRAM(s) Oral daily

## 2022-07-11 NOTE — DIETITIAN INITIAL EVALUATION ADULT - DIET TYPE
Recommend transition to oral intake per speech and medically feasible including Enlive BID and increased protein intake Edy BID 2/2 to pressure injury when medically feasible.

## 2022-07-11 NOTE — DIETITIAN NUTRITION RISK NOTIFICATION - TREATMENT: THE FOLLOWING DIET HAS BEEN RECOMMENDED
Diet, NPO (07-10-22 @ 23:20) [Active]    Recommend transition to oral intake per speech and medically feasible including Enlive BID and increased protein intake Edy BID 2/2 to pressure injury when medically feasible.

## 2022-07-11 NOTE — SWALLOW BEDSIDE ASSESSMENT ADULT - ORAL PREPARATORY PHASE
Refuses to accept bolus into oral cavity/Reduced oral grading/Bolus falls into left lateral sulci Refuses to accept bolus into oral cavity/Reduced oral grading

## 2022-07-11 NOTE — CONSULT NOTE ADULT - ASSESSMENT
Assessment:  Nicole Vaughan is a 89 year old woman with past medical history of Mild dementia, Autonomic dysfunction with orthostatic hypotension and remote history of Ovarian cancer (s/p TAHBSO/omental resection) who was brought in to hospital due to weakness, increased confusion and cough, found to have COVID-19.     Cardiology consulted due to elevated troponin.  Assessment:  Nicole Vaughan is a 89 year old woman with past medical history of Mild dementia, Autonomic dysfunction with orthostatic hypotension and remote history of Ovarian cancer (s/p TAHBSO/omental resection) who was brought in to hospital due to weakness, increased confusion and cough, found to have COVID-19, hypernatremia and lactic acidosis.     Cardiology consulted due to elevated troponin which has now peaked. ECG consistent with sinus rhythm, bifascicular block, anteroseptal and lateral T wave inversions, RBBB and ST changes appear new. Elevated troponin may be demand ischemia from COVID-19, possible thrombotic state (elevated D-Dimer) but cannot rule out underlying CAD given advanced age and abnormal ECG. CT head with no acute infarct.     Recommendations:  [] COVID-19: Management per primary team   [] Elevated troponin, abnormal ECG, RBBB: Troponin has peaked, follow up echo to evaluate for LV wall motion abnormalities. Consider Aspirin 81 mg daily. Will need to discuss with family regarding these findings, the patient appears to be a poor candidate for cardiac procedures given frail state, altered mental status, advanced age and current active COVID infection. Would also not perform pharmacologic nuclear stress test in setting of active COVID-19 and altered mental status. Agree with goals of care discussion    Anahi Weaver MD  Cardiology          Assessment:  Nicole Vaughan is a 89 year old woman with past medical history of Mild dementia, Autonomic dysfunction with orthostatic hypotension and remote history of Ovarian cancer (s/p TAHBSO/omental resection) who was brought in to hospital due to weakness, increased confusion and cough, found to have COVID-19, hypernatremia and lactic acidosis.     Cardiology consulted due to elevated troponin which has now peaked. ECG consistent with sinus rhythm, bifascicular block, anteroseptal and lateral T wave inversions, RBBB and ST changes appear new. Elevated troponin may be demand ischemia from COVID-19, possible thrombotic state (elevated D-Dimer) but cannot rule out underlying CAD given advanced age and abnormal ECG. CT head with no acute infarct.     Recommendations:  [] COVID-19: Management per primary team, consider evaluation for DVT and pulmonary embolism   [] Elevated troponin, abnormal ECG, RBBB: Troponin has peaked, follow up echo to evaluate for LV wall motion abnormalities. Consider Aspirin 81 mg daily. Will need to discuss with family regarding these findings, the patient appears to be a poor candidate for cardiac procedures given frail state, altered mental status, advanced age and current active COVID infection. Would also not perform pharmacologic nuclear stress test in setting of active COVID-19 and altered mental status. Agree with goals of care discussion    Anahi Weaver MD  Cardiology

## 2022-07-11 NOTE — SWALLOW BEDSIDE ASSESSMENT ADULT - SWALLOW EVAL: DIAGNOSIS
Patient presents with oropharyngeal dysphagia exacerbated by reduced breath and swallow coordination, partial dentition and suspected cognitive deficits. Patient was given PO trials of pureed, mildly thick liquids and thin liquids. Patient with reduced oral acceptance requiring maximal encouragement to accept PO trials provided. Patient with reduced labial stripping from the spoon and cup. Reduced bolus formation and manipulation, and prolonged AP transit. Trace to mild lingual residue noted with pureed solids. Cleared with liquid wash provided. Reduced and faint hyolaryngeal elevation and excursion noted upon palpation. No clinical s/sx of aspiration observed with pureed and mildly thick liquids; however, clinical s/sx of aspiration with thin liquids characterized by wet cough and increased wet vocal quality. Patient refused additional trials of thin liquids.

## 2022-07-11 NOTE — CONSULT NOTE ADULT - ASSESSMENT
89F hx of mild dementia, autonomic dysfxn with orthostatic hypotension, glaucoma, hx of remote ovarian ca s/p TAHBSO/omental resection pw acute hypoxic respiratory failure sec to COVID 19 with severe sepsis with BRETT and hypernatremia.     #COVID 19 with acute hypoxic respiratory failure  cont supplemental oxygen.   not a candidate for IV Remdesivir sec to crcl: 16.3. consider tomorrow as creatinine improves.   proning as tolerated.   IV Decadron.   alb prn  vitamin C, D, zinc  continuous pulse ox.   check routine COVID inflammatory markers including trop, d-dimer, BNP, procal, LDH, ferritin, CRP.   DVT proph per COVID guidelines. modify pending d-dimer   UA with +nitrite, but few bacteria and no wbc. s/p Vanco and Zosyn in ED .. hold additional antibxs for now. FU procal and urine cx results.    #BRETT and hypernatremia and persistent elev lactate likely sec to dehydration.  s/p sepsis fluids in ED and LR in ED.  IVFs with D5W, adjust fluids following Na    #Orthostatic hypotension/Autonomic dysfunction.  cont fludrocortisone and midodrine.     #GOC  As per ED, they already had extensive conversation with daughter. Pt is DNR/DNI. no BIPAP. Treat for infections.   pALLIATIVE:  Daughter uRthann Zamora [phone] CALLED.  Message left.  No palliative recommendations at this time>  Further palliative interventions pending clinical course.          CAPRINI SCORE [CLOT]    AGE RELATED RISK FACTORS                                                       MOBILITY RELATED FACTORS  [ ] Age 41-60 years                                            (1 Point)                  [ ] Bed rest                                                        (1 Point)  [ ] Age: 61-74 years                                           (2 Points)                 [ ] Plaster cast                                                   (2 Points)  [3 ] Age= 75 years                                              (3 Points)                 [ ] Bed bound for more than 72 hours                 (2 Points)    DISEASE RELATED RISK FACTORS                                               GENDER SPECIFIC FACTORS  [ ] Edema in the lower extremities                       (1 Point)                  [ ] Pregnancy                                                     (1 Point)  [ ] Varicose veins                                               (1 Point)                  [ ] Post-partum < 6 weeks                                   (1 Point)             [ ] BMI > 25 Kg/m2                                            (1 Point)                  [ ] Hormonal therapy  or oral contraception          (1 Point)                 [ ] Sepsis (in the previous month)                        (1 Point)                  [ ] History of pregnancy complications                 (1 point)  [ ] Pneumonia or serious lung disease                                               [ ] Unexplained or recurrent                     (1 Point)           (in the previous month)                               (1 Point)  [ ] Abnormal pulmonary function test                     (1 Point)                 SURGERY RELATED RISK FACTORS  [ ] Acute myocardial infarction                              (1 Point)                 [ ]  Section                                             (1 Point)  [ ] Congestive heart failure (in the previous month)  (1 Point)               [ ] Minor surgery                                                  (1 Point)   [ ] Inflammatory bowel disease                             (1 Point)                 [ ] Arthroscopic surgery                                        (2 Points)  [ ] Central venous access                                      (2 Points)                [ ] General surgery lasting more than 45 minutes   (2 Points)       [ ] Stroke (in the previous month)                          (5 Points)               [ ] Elective arthroplasty                                         (5 Points)                                                                                                                                               HEMATOLOGY RELATED FACTORS                                                 TRAUMA RELATED RISK FACTORS  [ ] Prior episodes of VTE                                     (3 Points)                [ ] Fracture of the hip, pelvis, or leg                       (5 Points)  [ ] Positive family history for VTE                         (3 Points)                 [ ] Acute spinal cord injury (in the previous month)  (5 Points)  [ ] Prothrombin 06341 A                                     (3 Points)                 [ ] Paralysis  (less than 1 month)                             (5 Points)  [ ] Factor V Leiden                                             (3 Points)                  [ ] Multiple Trauma within 1 month                        (5 Points)  [ ] Lupus anticoagulants                                     (3 Points)                                                           [ ] Anticardiolipin antibodies                               (3 Points)                                                       [ ] High homocysteine in the blood                      (3 Points)                                             [ ] Other congenital or acquired thrombophilia      (3 Points)                                                [ ] Heparin induced thrombocytopenia                  (3 Points)                                          Total Score [  3        ]    Caprini Score 0 - 2:  Low Risk, No VTE Prophylaxis required for most patients, encourage ambulation  Caprini Score 3 - 6:  At Risk, pharmacologic VTE prophylaxis is indicated for most patients (in the absence of a contraindication)  Caprini Score Greater than or = 7:  High Risk, pharmacologic VTE prophylaxis is indicated for most patients (in the absence of a contraindication)

## 2022-07-11 NOTE — PATIENT PROFILE ADULT - HAVE YOU EXPERIENCED VIOLENCE OR A TRAUMATIC EVENT?
Advancement Flap (Double) Text: The defect edges were debeveled with a #15 scalpel blade.  Given the location of the defect and the proximity to free margins a double advancement flap was deemed most appropriate.  Using a sterile surgical marker, the appropriate advancement flaps were drawn incorporating the defect and placing the expected incisions within the relaxed skin tension lines where possible.    The area thus outlined was incised deep to adipose tissue with a #15 scalpel blade.  The skin margins were undermined to an appropriate distance in all directions utilizing iris scissors. no

## 2022-07-11 NOTE — DIETITIAN INITIAL EVALUATION ADULT - NS FNS DIET ORDER
Diet, NPO (07-10-22 @ 23:20)  Recommend transition to oral intake per speech and medically feasible including Enlive BID and increased protein intake Edy BID 2/2 to pressure injury when medically feasible.

## 2022-07-12 NOTE — PROGRESS NOTE ADULT - ASSESSMENT
·	LE u/s neg DVT ,,,elev D-Dimer and troponin.....cardiology and ID notes appreciated....remdesivir  started with IV decadron...would order V/Q scan to rule out PE as current heparin is prophylactic

## 2022-07-12 NOTE — PROGRESS NOTE ADULT - ASSESSMENT
89y female with mild dementia, autonomic dysfxn with orthostatic hypotension, glaucoma, hx of remote ovarian ca s/p TAHBSO/omental resection pw acute hypoxic respiratory failure and L hand lacerations. Hx obtained from daughter Ruthann Zamora [phone]. Pt has been living with her son for the past month while daughter is caring for  who is recuperating from hip sx. As per the chart notes the patient was noted to have  with severe weakness, increased confusion from baseline and as pt was transported to daughter's car somehow sustained lacerations to the L hand and L forearm and daughter bought pt to ED. The patient was tested for COVID 19 and she tested positive. ID consulted for further management.  VS reviewed and in the ED she was found to have fever and leukocytosis noted todays labs. reviewed labs noted that her cr did improve, I believe her treatment for COVID is most important and we can continue to monitor daily cr. RDV started yesterday for Covid.  She has elevated inflammatory markers and is on both decadron and RDV  Plan   1.RDV, day 2/5  2.decadron, day 3/10  3,Anticoaglation per primary service  4.Supplemental O2 as needed  5.Appears to be aspiration risk  6.Goals of care noted, may be approaching end of life

## 2022-07-12 NOTE — PROGRESS NOTE ADULT - ASSESSMENT
Assessment:  Nicole Vaughan is a 89 year old woman with past medical history of Mild dementia, Autonomic dysfunction with orthostatic hypotension and remote history of Ovarian cancer (s/p TAHBSO/omental resection) who was brought in to hospital due to weakness, increased confusion and cough, found to have COVID-19, hypernatremia and lactic acidosis.     Cardiology consulted due to elevated troponin which has now peaked. ECG consistent with sinus rhythm, bifascicular block, anteroseptal and lateral T wave inversions, RBBB and ST changes appear new. Elevated troponin may be demand ischemia from COVID-19, possible thrombotic state (elevated D-Dimer) but cannot rule out underlying CAD given advanced age and abnormal ECG. CT head with no acute infarct.     Recommendations:  [] COVID-19: Management per primary team, consider evaluation for DVT and pulmonary embolism   [] Elevated troponin, abnormal ECG, RBBB: Troponin has peaked, follow up echo to evaluate for LV wall motion abnormalities. Consider Aspirin 81 mg daily. Will need to discuss with family regarding these findings, the patient appears to be a poor candidate for cardiac procedures given frail state, altered mental status, advanced age and current active COVID infection. Would also not perform pharmacologic nuclear stress test in setting of active COVID-19 and altered mental status. Agree with goals of care discussion    We will continue to follow along     Anahi Weaver MD  Cardiology              Assessment:  Nicole Vaughan is a 89 year old woman with past medical history of Mild dementia, Autonomic dysfunction with orthostatic hypotension and remote history of Ovarian cancer (s/p TAHBSO/omental resection) who was brought in to hospital due to weakness, increased confusion and cough, found to have COVID-19, hypernatremia and lactic acidosis.     Cardiology consulted due to elevated troponin which has now peaked. ECG consistent with sinus rhythm, bifascicular block, anteroseptal and lateral T wave inversions, RBBB and ST changes appear new. Elevated troponin may be demand ischemia from COVID-19, possible thrombotic state (elevated D-Dimer) but cannot rule out underlying CAD given advanced age and abnormal ECG. CT head with no acute infarct.     Recommendations:  [] COVID-19: Management per primary team, leg US negative for DVT, patient having PE workup   [] Elevated troponin, abnormal ECG, RBBB: Troponin has peaked, follow up echo to evaluate for LV wall motion abnormalities. Consider Aspirin 81 mg daily. Will need to discuss with family regarding these findings, the patient appears to be a poor candidate for cardiac procedures given frail state, altered mental status, advanced age and current active COVID infection. Would also not perform pharmacologic nuclear stress test in setting of active COVID-19 and altered mental status. Agree with goals of care discussion    We will continue to follow along     Anahi Weaver MD  Cardiology              Assessment:  Nicole Vaughan is a 89 year old woman with past medical history of Mild dementia, Autonomic dysfunction with orthostatic hypotension and remote history of Ovarian cancer (s/p TAHBSO/omental resection) who was brought in to hospital due to weakness, increased confusion and cough, found to have COVID-19, hypernatremia and lactic acidosis.     Cardiology consulted due to elevated troponin which has now peaked. ECG consistent with sinus rhythm, bifascicular block, anteroseptal and lateral T wave inversions, RBBB and ST changes appear new. Elevated troponin may be demand ischemia from COVID-19, possible thrombotic state (elevated D-Dimer) but cannot rule out underlying CAD given advanced age and abnormal ECG. CT head with no acute infarct.     Recommendations:  [] COVID-19: Management per primary team, leg US negative for DVT, patient having PE workup   [] Elevated troponin, abnormal ECG, RBBB: Troponin has peaked, follow up echo to evaluate for LV wall motion abnormalities. Place patient on telemetry. Consider Aspirin 81 mg daily. Will need to discuss with family regarding these findings, the patient appears to be a poor candidate for cardiac procedures given frail state, altered mental status, advanced age and current active COVID infection. Would also not perform pharmacologic nuclear stress test in setting of active COVID-19 and altered mental status. Agree with goals of care discussion    We will continue to follow along     Anahi Weaver MD  Cardiology

## 2022-07-12 NOTE — PROGRESS NOTE ADULT - ASSESSMENT
·	LE u/s neg DVT ,,,elev D-Dimer and troponin.....cardiology and ID notes appreciated....remdesivir    ·	Palliative:  reviewed prognosis with daughter Ruthann Dixon..  She is aware that her dx with hx of dementia and now covid is not great.  She also states that she would not want ngt/peg for her mother.  ·	Plan:  continue [id number]

## 2022-07-12 NOTE — CONSULT NOTE ADULT - SUBJECTIVE AND OBJECTIVE BOX
NEPHROLOGY CONSULTATION    CHIEF COMPLAINT: SOB    HPI:  Pt is 89 yo F w/hx of mild dementia, autonomic dysfx with orthostatic hypotension, glaucoma, hx of remote ovarian ca s/p KEN/BSO/omental resection p/w acute hypoxic respiratory failure and L hand lacerations 7/10/22. Hx from chart. Pt has mild cough. In ED pt was febrile to 102.7, tachy to 105, sat 78% on RA and then 99% on NRB. Noted to have BRETT, hypernatremia on adm. Being eval for PE, could not tolerate VQ scan. No N/V/D. Tested positive for COVID.    ROS:  as above    Allergies:  No Known Allergies    PAST MEDICAL & SURGICAL HISTORY:  dementia  Depression  Glaucoma  Bladder dysfunction  Malignant neoplasm of ovary, unspecified laterality  H/O: hysterectomy    SOCIAL HISTORY:  negative    FAMILY HISTORY:  FH: prostate cancer  father    MEDICATIONS  (STANDING):  ascorbic acid 500 milliGRAM(s) Oral daily  cholecalciferol 2000 Unit(s) Oral daily  dexAMETHasone  Injectable 6 milliGRAM(s) IV Push daily  dextrose 5%. 1000 milliLiter(s) (60 mL/Hr) IV Continuous <Continuous>  enoxaparin Injectable 40 milliGRAM(s) SubCutaneous every 24 hours  fludroCORTISONE 0.1 milliGRAM(s) Oral two times a day  latanoprost 0.005% Ophthalmic Solution 1 Drop(s) Both EYES at bedtime  midodrine. 10 milliGRAM(s) Oral three times a day  multivitamin 1 Tablet(s) Oral daily  pantoprazole    Tablet 40 milliGRAM(s) Oral before breakfast  polyethylene glycol 3350 17 Gram(s) Oral daily  remdesivir  IVPB   IV Intermittent   remdesivir  IVPB 100 milliGRAM(s) IV Intermittent every 24 hours  senna 2 Tablet(s) Oral at bedtime  zinc sulfate 220 milliGRAM(s) Oral daily    Home Medications:  Colace 100 mg oral capsule: 1 cap(s) orally 2 times a day (10 Jul 2022 22:17)  fludrocortisone 0.1 mg oral tablet: 1 tab(s) orally 2 times a day (10 Jul 2022 22:17)  latanoprost 0.005% ophthalmic emulsion: 1 drop(s) to each affected eye once a day (in the evening) (10 Jul 2022 22:17)  midodrine 10 mg oral tablet: 1 tab(s) orally 3 times a day (10 Jul 2022 22:17)  MiraLax oral powder for reconstitution: orally once a day (10 Jul 2022 22:17)  Multiple Vitamins oral capsule: 1 cap(s) orally once a day (10 Jul 2022 22:17)  Senna 8.6 mg oral tablet: 1 tab(s) orally once a day (at bedtime) (10 Jul 2022 22:17)    Vital Signs Last 24 Hrs  T(C): 36.9 (22 @ 15:56), Max: 36.9 (22 @ 15:56)  T(F): 98.5 (22 @ 15:56), Max: 98.5 (22 @ 15:56)  HR: 70 (22 @ 15:56) (60 - 70)  BP: 154/55 (22 @ 15:56) (132/51 - 154/55)  RR: 18 (22 @ 15:56) (16 - 18)  SpO2: 95% (22 @ 15:56) (95% - 97%)    I&O's Detail    2022 07:01  -  2022 07:00  --------------------------------------------------------  IN:    dextrose 5%: 780 mL  Total IN: 780 mL    OUT:    Voided (mL): 250 mL  Total OUT: 250 mL    s1s2  b/l air entry  soft, ND  no edema    LABS:                        12.7   9.92  )-----------( 198      ( 2022 10:21 )             39.2     07-12    141  |  105  |  25<H>  ----------------------------<  122<H>  3.0<L>   |  30  |  0.79    Ca    9.7      2022 10:21  Mg     1.8     07-11    TPro  6.5  /  Alb  1.6<L>  /  TBili  0.6  /  DBili  0.0  /  AST  67<H>  /  ALT  31  /  AlkPhos  55  07-12    Urinalysis Basic - ( 10 Jul 2022 23:00 )    Color: Yellow / Appearance: Clear / S.015 / pH: x  Gluc: x / Ketone: Negative  / Bili: Negative / Urobili: Negative   Blood: x / Protein: 100 / Nitrite: Positive   Leuk Esterase: Negative / RBC: 5-10 /HPF / WBC 0-2 /HPF   Sq Epi: x / Non Sq Epi: Neg.-Few / Bacteria: Few /HPF    LIVER FUNCTIONS - ( 2022 05:00 )  Alb: 1.6 g/dL / Pro: 6.5 g/dL / ALK PHOS: 55 U/L / ALT: 31 U/L / AST: 67 U/L / GGT: x           Culture - Urine (collected 10 Jul 2022 23:00)  Source: Clean Catch Clean Catch (Midstream)  Final Report (2022 13:46):    <10,000 CFU/mL Normal Urogenital Namita    Culture - Blood (collected 10 Jul 2022 17:45)  Source: .Blood Blood-Peripheral  Preliminary Report (2022 01:02):    No growth to date.    Culture - Blood (collected 10 Jul 2022 17:45)  Source: .Blood Blood-Peripheral  Preliminary Report (2022 01:02):    No growth to date.    PT/INR - ( 10 Jul 2022 17:45 )   PT: 11.8 sec;   INR: 1.02 ratio       PTT - ( 10 Jul 2022 17:45 )  PTT:28.6 sec    A/P:    COVID PNA  Pre-renal BRETT, dehydration on adm  Improved w/IVF  No objection to CTA from renal pov  Will continue gentle IVF for another day to avoid dye injury  BMP, Mg in am  Suppl K  Avoid nephrotoxins    [phone]

## 2022-07-13 NOTE — PROGRESS NOTE ADULT - ASSESSMENT
Assessment:  Nicole Vaughan is a 89 year old woman with past medical history of Mild dementia, Autonomic dysfunction with orthostatic hypotension and remote history of Ovarian cancer (s/p TAHBSO/omental resection) who was brought in to hospital due to weakness, increased confusion and cough, found to have COVID-19, hypernatremia and lactic acidosis.     Cardiology consulted due to elevated troponin which has now peaked. ECG consistent with sinus rhythm, bifascicular block, anteroseptal and lateral T wave inversions, RBBB and ST changes appear new. Elevated troponin may be demand ischemia from COVID-19, possible thrombotic state (elevated D-Dimer) but cannot rule out underlying CAD given advanced age and abnormal ECG. CT head with no acute infarct.     Recommendations:  [] COVID-19: Management per primary team, leg US negative for DVT and CTPA negative for pulmonary embolism, but concerning for possible COVID pneumonia   [] Elevated troponin, abnormal ECG, RBBB: Troponin has peaked, echo consistent with normal LV systolic function, mild LVH, mild-moderate valvular disease. Consider Aspirin 81 mg daily.  The patient appears to be a poor candidate for cardiac procedures given frail state, altered mental status, advanced age and current active COVID infection does not appear to be appropriate candidate for cardiac procedures. Discussed with daughterRuthann/HCP ([phone]) and she prefers conservative medical management. Recommend goals of care discussion.     Please re-consult if needed.    Anahi Weaver MD  Cardiology

## 2022-07-13 NOTE — PROGRESS NOTE ADULT - ASSESSMENT
.sodium corrected...K supplementation....prognosis guarded  family aware...continue covid protocol  Imp:  clinically improved.  No changes recommended.

## 2022-07-13 NOTE — PROGRESS NOTE ADULT - ASSESSMENT
abn CT chest with no pulm emboli....sodium corrected...K supplementation....prognosis guarded  family aware...continue covid protocol

## 2022-07-13 NOTE — PHARMACOTHERAPY INTERVENTION NOTE - COMMENTS
Covid+, D-dimer 1252, r/o PE. Recommended adjusting HSQ to Lovenox 1mg/kg q24h.
TSH low (0.114). Recommended obtaining free T4.

## 2022-07-13 NOTE — PROGRESS NOTE ADULT - ASSESSMENT
89y female with mild dementia, autonomic dysfxn with orthostatic hypotension, glaucoma, hx of remote ovarian ca s/p TAHBSO/omental resection pw acute hypoxic respiratory failure and L hand lacerations. Hx obtained from daughter Ruthann Zamora [phone]. Pt has been living with her son for the past month while daughter is caring for  who is recuperating from hip sx. As per the chart notes the patient was noted to have  with severe weakness, increased confusion from baseline and as pt was transported to daughter's car somehow sustained lacerations to the L hand and L forearm and daughter bought pt to ED. The patient was tested for COVID 19 and she tested positive. ID consulted for further management. In  the ED she was found to have fever and leukocytosis ,RDV and decadron started.. RDV 24 hours after decadron.  She has elevated inflammatory markers and is on both decadron and RDV,  CT scan with patchy B/L infiltrates, ? Covid, no PE  Family declining invasive cardiac w/u for elevated trponins  Her O2 sats are 94% on RA  Plan   1.RDV, day 3/5  2.decadron, day 4/10  3,Anticoaglation per primary service  4.Supplemental O2 as needed  5.Appears to be aspiration risk but has been stable  6.Goals of care noted, may be approaching end of life

## 2022-07-14 NOTE — PROGRESS NOTE ADULT - ASSESSMENT
discussed with daughter ...wishes to talk to casemanager after PT evaluation  ,,,,ID follow up appreciated...

## 2022-07-14 NOTE — DISCHARGE NOTE FOR THE EXPIRED PATIENT - HOSPITAL COURSE
89F hx of mild dementia, autonomic dysfxn with orthostatic hypotension, glaucoma, hx of remote ovarian ca s/p TAHBSO/omental resection pw acute hypoxic respiratory failure and L hand lacerations. Hx obtained from daughter Ruthann Zamora [phone]. Pt has been living with her son for the past month while daughter is caring for  who is recuperating from hip sx. Daughter picked up pt today and noted pt with severe weakness, increased confusion from baseline and as pt was transported to daughter's car somehow sustained lacerations to the L hand and L forearm and daughter bought pt to ED. Son had reported to daughter that pt had a mild cough. In ED pt febrile to 102.7 tachy to 105 BP:109/55 sat 78%on RA and then 99% on NRB. WBC: 8.72 Lact: 5.5 Na: 151 C: 114 BUN/cr: 43/1.4 (baseline cr: 0.88) CT head neg. COVID +. UA pending. Pt currently more alert and responding to some questions and now sat 95% on 4LNC. s/p sepsis fluids and IV Vanco and Zosyn in ED.  89F hx of mild dementia, autonomic dysfxn with orthostatic hypotension, glaucoma, hx of remote ovarian ca s/p TAHBSO/omental resection pw acute hypoxic respiratory failure and L hand lacerations. Hx obtained from daughter Ruthann Zamora [phone]. Pt has been living with her son for the past month while daughter is caring for  who is recuperating from hip sx. Daughter picked up pt today and noted pt with severe weakness, increased confusion from baseline and as pt was transported to daughter's car somehow sustained lacerations to the L hand and L forearm and daughter bought pt to ED. Son had reported to daughter that pt had a mild cough. In ED pt febrile to 102.7 tachy to 105 BP:109/55 sat 78%on RA and then 99% on NRB. WBC: 8.72 Lact: 5.5 Na: 151 C: 114 BUN/cr: 43/1.4 (baseline cr: 0.88) CT head neg. COVID +. UA pending. Pt currently more alert and responding to some questions and now sat 95% on 4LNC. s/p sepsis fluids and IV Vanco and Zosyn in ED.  Admitted to hospitalist.  Started on RDV and Decadron for Covid. ID followed during hospital course. Patient clinical status did not improve. Patient DNR/DNI. Palliative care consulted during hospital course.    On , patient .  Family informed . Declined auotpsy

## 2022-07-14 NOTE — PROGRESS NOTE ADULT - NUTRITIONAL ASSESSMENT
This patient has been assessed with a concern for Malnutrition and has been determined to have a diagnosis/diagnoses of Severe protein-calorie malnutrition and Underweight (BMI < 19).    This patient is being managed with:   Diet Pureed-  DASH/TLC {Sodium & Cholesterol Restricted}  Mildly Thick Liquids (MILDTHICKLIQS)  Supplement Feeding Modality:  Oral  Ensure Enlive Cans or Servings Per Day:  1       Frequency:  Two Times a day  Entered: Jul 12 2022 11:24AM    
This patient has been assessed with a concern for Malnutrition and has been determined to have a diagnosis/diagnoses of Severe protein-calorie malnutrition and Underweight (BMI < 19).    This patient is being managed with:   Diet Pureed-  Mildly Thick Liquids (MILDTHICKLIQS)  Supplement Feeding Modality:  Oral  Ensure Enlive Cans or Servings Per Day:  1       Frequency:  Two Times a day  Entered: Jul 11 2022  3:08PM    
This patient has been assessed with a concern for Malnutrition and has been determined to have a diagnosis/diagnoses of Severe protein-calorie malnutrition and Underweight (BMI < 19).    This patient is being managed with:   Diet Pureed-  DASH/TLC {Sodium & Cholesterol Restricted}  Mildly Thick Liquids (MILDTHICKLIQS)  Supplement Feeding Modality:  Oral  Ensure Enlive Cans or Servings Per Day:  1       Frequency:  Two Times a day  Entered: Jul 12 2022 11:24AM    
This patient has been assessed with a concern for Malnutrition and has been determined to have a diagnosis/diagnoses of Severe protein-calorie malnutrition and Underweight (BMI < 19).    This patient is being managed with:   Diet Pureed-  DASH/TLC {Sodium & Cholesterol Restricted}  Mildly Thick Liquids (MILDTHICKLIQS)  Supplement Feeding Modality:  Oral  Ensure Enlive Cans or Servings Per Day:  1       Frequency:  Two Times a day  Entered: Jul 12 2022 11:24AM    
This patient has been assessed with a concern for Malnutrition and has been determined to have a diagnosis/diagnoses of Severe protein-calorie malnutrition and Underweight (BMI < 19).    This patient is being managed with:   Diet Pureed-  Mildly Thick Liquids (MILDTHICKLIQS)  Supplement Feeding Modality:  Oral  Ensure Enlive Cans or Servings Per Day:  1       Frequency:  Two Times a day  Entered: Jul 11 2022  3:08PM

## 2022-07-14 NOTE — CHART NOTE - NSCHARTNOTEFT_GEN_A_CORE
89F hx of mild dementia, autonomic dysfxn with orthostatic hypotension, glaucoma, hx of remote ovarian ca s/p TAHBSO/omental resection pw acute hypoxic respiratory failure sec to COVID 19 with severe sepsis with BRETT and hypernatremia.     Discussed with Dr. Bishop and agrees with plan    #COVID 19 with acute hypoxic respiratory failure  cont supplemental oxygen. : 97% on 4L   Remdemsivir: day 2  IV Decadron. : day 3  continuous pulse ox.   d-dimer elevated 1252, BNP elevated 75924, procal elevated 2.98, LDH elevated 297, ferritin 796, .   Doppler LE ordered f/u to r/o DVT    #Elevated troponins:  trop elevated 71.6 peaked down to 58.9,   Obtain CTA chest rule out PE  Repeat TTE  Cardio to discuss with family ie role of further workup (angiogram)    #Hypokalemia  -3.0 this am, repleted    #Orthostatic hypotension/Autonomic dysfunction.  cont fludrocortisone and midodrine.     #GOC  As per ED, they already had extensive conversation with daughter. Pt is DNR/DNI. no BIPAP. Treat for infections.     Daughter Ruthann Zamora [phone].
MEDICINE NOTE    Called to bedside to evaluate the patient for Ms Vaughan    On physical exam, patient did not respond to verbal or noxious stimuli.  No spontaneous respirations.  Absent heart and breath sounds.  Absent radial and carotid pulses.   Pupils are fixed and dilated, no corneal reflex.  EKG rhythm strip shows asystole.   Patient pronounced dead at 9:27 AM. Attending notified.  Family declined autopsy.
89F hx of mild dementia, autonomic dysfxn with orthostatic hypotension, glaucoma, hx of remote ovarian ca s/p TAHBSO/omental resection pw acute hypoxic respiratory failure sec to COVID 19 with severe sepsis with BRETT and hypernatremia.     Discussed with Dr. Bishop and agrees with plan    #COVID 19 with acute hypoxic respiratory failure  #Leukocytosis  cont supplemental oxygen.   Per ID started on Remdesivir  proning as tolerated.   IV Decadron.   alb prn  vitamin C, D, zinc  continuous pulse ox.   trop elevated 71.6 peaked down to 58.9, d-dimer elevated 1252, BNP elevated 67022, procal elevated 2.98, LDH elevated 297, ferritin 796, .   Doppler LE ordered f/u to r/o DVT  Cardiology consult for elevated troponins, F/U TTE  Palliative consult  DVT proph per COVID guidelines.   UA with +nitrite, but few bacteria and no wbc. s/p Vanco and Zosyn in ED .. hold additional antibxs for now. FU procal and urine cx results.    #Hypokalemia  -3.1 this am, repleted  -LR with Potassium additive  -Monitor BMP    #BRETT and hypernatremia and persistent elev lactate likely sec to dehydration.  s/p sepsis fluids in ED and LR in ED.  IVFs with D5W, adjust fluids following Na    #Orthostatic hypotension/Autonomic dysfunction.  cont fludrocortisone and midodrine.     #GOC  As per ED, they already had extensive conversation with daughter. Pt is DNR/DNI. no BIPAP. Treat for infections.     Daughter Ruthann Zamora [phone]
89F hx of mild dementia, autonomic dysfxn with orthostatic hypotension, glaucoma, hx of remote ovarian ca s/p TAHBSO/omental resection pw acute hypoxic respiratory failure sec to COVID 19 with severe sepsis with BRETT and hypernatremia.     Discussed with Dr. Bishop and agrees with plan    #COVID 19 with acute hypoxic respiratory failure  cont supplemental oxygen. : 97% on 4L   Remdemsivir: day 3  IV Decadron. : day 2  continuous pulse ox.   d-dimer elevated 1252, BNP elevated 43890, procal elevated 2.98, LDH elevated 297, ferritin 796, .   Doppler LE ordered f/u to r/o DVT    #Elevated troponins:  trop elevated 71.6 peaked down to 58.9,   CTA chest neg for PE   TTE EF 65-70%, mild to mod TR   Cardio to discuss with family ie role of further workup (angiogram)    #Hypokalemia  -3.0 this am, repleted  Mg 1.6    #Orthostatic hypotension/Autonomic dysfunction.  cont fludrocortisone and midodrine.     #GOC  As per ED, they already had extensive conversation with daughter. Pt is DNR/DNI. no BIPAP. Treat for infections.     Dispo: suspect will need [facility] with comfort care     Daughter Ruthann Zamora [phone].
Assessment:   Patient seen for: f/u    Source: [x] medical review, [x] RN    Factors impacting intake: [x] lack of appetite:     Intake: 0-25%    Diet Prescription: Diet, Pureed:   DASH/TLC {Sodium & Cholesterol Restricted}  Mildly Thick Liquids (MILDTHICKLIQS)  Supplement Feeding Modality:  Oral  Ensure Enlive Cans or Servings Per Day:  1       Frequency:  Two Times a day (07-12-22 @ 11:23)      Current Weight: Weight (kg): 40.8 (07-13 @ 07:11) 90 Lbs  7/11: 89.9 Lbs  7/10: 89.9 Lbs    % Weight Change: 0%    As per RN. pt has poor appetite. Pt is assisted with feedings. PO intake <25%. Supplement unopened. Offered pt drinks and supplement and pt refused, noted pt spitted drinks.  SLP performed a swallowing evaluation on 7/11. No GI distress reported. As per flowsheet no BM since admission. No edema. Skin PI on sacrum stage 2, on MVI, Vit C, and zinc. Pt is on IVF (NS 0.45% and D5 50mL/hr). As per Palliative Care note on 7/12, daughter would not want ngt/peg for her mother.       Pertinent Medications: MEDICATIONS  (STANDING):  ascorbic acid 500 milliGRAM(s) Oral daily  cholecalciferol 2000 Unit(s) Oral daily  dexAMETHasone  Injectable 6 milliGRAM(s) IV Push daily  dextrose 5% + sodium chloride 0.45% with potassium chloride 20 mEq/L 1000 milliLiter(s) (50 mL/Hr) IV Continuous <Continuous>  enoxaparin Injectable 40 milliGRAM(s) SubCutaneous every 24 hours  fludroCORTISONE 0.1 milliGRAM(s) Oral two times a day  latanoprost 0.005% Ophthalmic Solution 1 Drop(s) Both EYES at bedtime  midodrine. 10 milliGRAM(s) Oral three times a day  multivitamin 1 Tablet(s) Oral daily  pantoprazole    Tablet 40 milliGRAM(s) Oral before breakfast  polyethylene glycol 3350 17 Gram(s) Oral daily  potassium chloride  10 mEq/100 mL IVPB 10 milliEquivalent(s) IV Intermittent every 1 hour  remdesivir  IVPB   IV Intermittent   remdesivir  IVPB 100 milliGRAM(s) IV Intermittent every 24 hours  senna 2 Tablet(s) Oral at bedtime  zinc sulfate 220 milliGRAM(s) Oral daily    MEDICATIONS  (PRN):  acetaminophen     Tablet .. 650 milliGRAM(s) Oral every 6 hours PRN Temp greater or equal to 38C (100.4F), Mild Pain (1 - 3)  ALBUTerol    90 MICROgram(s) HFA Inhaler 1 Puff(s) Inhalation every 4 hours PRN Bronchospasm    Pertinent Labs: 07-13 Na141 mmol/L Glu 121 mg/dL<H> K+ 3.0 mmol/L<L> Cr  0.77 mg/dL BUN 23 mg/dL 07-13 Alb 2.1 g/dL<L>     CAPILLARY BLOOD GLUCOSE      Skin: PI on sacrum stage 2    Edema: None reported    Estimated Needs:   [x] no change since previous assessment  [ ] recalculated:     Previous Nutrition Diagnosis:   [x] Malnutrition Severe    Nutrition Diagnosis is [x] ongoing     New Nutrition Diagnosis: [ ] not applicable       Interventions:   Recommend  [x] Change diet: Regular, pureed/mildly thick liquids  [x] Nutrition Supplement: Ensure Enlive BID (provide 350 calories, 20 gm protein per 8 oz)   [x] Nutrition Support: continue w/ feeding assistance  [x] Other: continue w/ MVI, Vit C, and zinc to promote wound healing    Monitoring and Evaluation:   Continue to monitor Nutritional intake, Tolerance to diet prescription, weights, labs, skin integrity.  other:  RD remains available upon request and will follow up per protocol.

## 2022-07-14 NOTE — PROGRESS NOTE ADULT - PROBLEM SELECTOR PLAN 2
protocol
resolved with hydration /supplement K
elevated d-dimer  oxygenating on 4L/nc  VQ scan ordered
decadron  ID consult follow cr cl
resolved with hydration /supplement K
elevated d-dimer  oxygenating on 4L/nc  VQ scan ordered

## 2022-07-14 NOTE — PROGRESS NOTE ADULT - SUBJECTIVE AND OBJECTIVE BOX
Resting    Vital Signs Last 24 Hrs  T(C): 36.6 (07-13-22 @ 11:47), Max: 37.1 (07-13-22 @ 06:05)  T(F): 97.9 (07-13-22 @ 11:47), Max: 98.8 (07-13-22 @ 06:05)  HR: 67 (07-13-22 @ 11:47) (64 - 70)  BP: 160/73 (07-13-22 @ 11:47) (143/64 - 160/73)  RR: 16 (07-13-22 @ 11:47) (16 - 18)  SpO2: 95% (07-13-22 @ 11:47) (92% - 95%)    s1s2  b/l air entry  soft, ND  no edema                        11.2   7.57  )-----------( 197      ( 13 Jul 2022 06:10 )             33.9     13 Jul 2022 06:10    141    |  106    |  23     ----------------------------<  121    3.0     |  27     |  0.77     Ca    9.3        13 Jul 2022 06:10  Mg     1.6       13 Jul 2022 06:10    TPro  6.2    /  Alb  2.1    /  TBili  0.5    /  DBili  0.1    /  AST  43     /  ALT  33     /  AlkPhos  54     13 Jul 2022 06:10    LIVER FUNCTIONS - ( 13 Jul 2022 06:10 )  Alb: 2.1 g/dL / Pro: 6.2 g/dL / ALK PHOS: 54 U/L / ALT: 33 U/L / AST: 43 U/L / GGT: x           Culture - Urine (collected 10 Jul 2022 23:00)  Source: Clean Catch Clean Catch (Midstream)  Final Report (12 Jul 2022 13:46):    <10,000 CFU/mL Normal Urogenital Namita    Culture - Blood (collected 10 Jul 2022 17:45)  Source: .Blood Blood-Peripheral  Preliminary Report (12 Jul 2022 01:02):    No growth to date.    Culture - Blood (collected 10 Jul 2022 17:45)  Source: .Blood Blood-Peripheral  Preliminary Report (12 Jul 2022 01:02):    No growth to date.    acetaminophen     Tablet .. 650 milliGRAM(s) Oral every 6 hours PRN  ALBUTerol    90 MICROgram(s) HFA Inhaler 1 Puff(s) Inhalation every 4 hours PRN  ascorbic acid 500 milliGRAM(s) Oral daily  cholecalciferol 2000 Unit(s) Oral daily  dexAMETHasone  Injectable 6 milliGRAM(s) IV Push daily  dextrose 5% + sodium chloride 0.45% with potassium chloride 20 mEq/L 1000 milliLiter(s) IV Continuous <Continuous>  enoxaparin Injectable 40 milliGRAM(s) SubCutaneous every 24 hours  fludroCORTISONE 0.1 milliGRAM(s) Oral two times a day  latanoprost 0.005% Ophthalmic Solution 1 Drop(s) Both EYES at bedtime  midodrine. 10 milliGRAM(s) Oral three times a day  multivitamin 1 Tablet(s) Oral daily  pantoprazole    Tablet 40 milliGRAM(s) Oral before breakfast  polyethylene glycol 3350 17 Gram(s) Oral daily  remdesivir  IVPB   IV Intermittent   remdesivir  IVPB 100 milliGRAM(s) IV Intermittent every 24 hours  senna 2 Tablet(s) Oral at bedtime  zinc sulfate 220 milliGRAM(s) Oral daily    A/P:    COVID PNA  Pre-renal BRETT, dehydration on adm  Resolved w/IVF  S/p CTA   Stable renal fx  Will continue gentle IVF for another day to avoid dye injury  BMP, Mg in am  Suppl K  Avoid nephrotoxins    [phone]      
CC: f/u for Covid    Patient reports: she is lethargic, on 4 liters nasal oxygen, not very interactive    REVIEW OF SYSTEMS:  All other review of systems negative (Comprehensive ROS)    Antimicrobials Day #  :day 2  remdesivir  IVPB   IV Intermittent   remdesivir  IVPB 100 milliGRAM(s) IV Intermittent every 24 hours    Other MEDICATIONS  (STANDING):  ascorbic acid 500 milliGRAM(s) Oral daily  cholecalciferol 2000 Unit(s) Oral daily  dexAMETHasone  Injectable 6 milliGRAM(s) IV Push daily  dextrose 5%. 1000 milliLiter(s) (60 mL/Hr) IV Continuous <Continuous>  enoxaparin Injectable 40 milliGRAM(s) SubCutaneous every 24 hours  fludroCORTISONE 0.1 milliGRAM(s) Oral two times a day  latanoprost 0.005% Ophthalmic Solution 1 Drop(s) Both EYES at bedtime  midodrine. 10 milliGRAM(s) Oral three times a day  multivitamin 1 Tablet(s) Oral daily  pantoprazole    Tablet 40 milliGRAM(s) Oral before breakfast  polyethylene glycol 3350 17 Gram(s) Oral daily  potassium chloride  10 mEq/100 mL IVPB 10 milliEquivalent(s) IV Intermittent every 1 hour  remdesivir  IVPB   IV Intermittent   remdesivir  IVPB 100 milliGRAM(s) IV Intermittent every 24 hours  senna 2 Tablet(s) Oral at bedtime  zinc sulfate 220 milliGRAM(s) Oral daily  Medications Reviewed    T(F): 97.4 (22 @ 04:57), Max: 98.2 (22 @ 16:19)  HR: 66 (22 @ 04:57)  BP: 133/50 (22 @ 04:57)  RR: 16 (22 @ 04:57)  SpO2: 97% (22 @ 04:57)  Wt(kg): --    PHYSICAL EXAM:  General: lethargic no acute distress, wasted in appearance  Eyes:  anicteric, no conjunctival injection, no discharge  Oropharynx: no lesions or injection 	  Neck: supple, without adenopathy  Lungs: clear to auscultation, distant BS  Heart: regular rate and rhythm; no murmur, rubs or gallops  Abdomen: soft, nondistended, nontender, without mass or organomegaly  Skin: no rash  Extremities: no clubbing, cyanosis, or edema  Neurologic: lethargic, poorly interactive    LAB RESULTS:                        12.7   9.92  )-----------( 198      ( 2022 10:21 )             39.2     07-    141  |  105  |  25<H>  ----------------------------<  122<H>  3.0<L>   |  30  |  0.79    Ca    9.7      2022 10:21  Mg     1.8         TPro  6.5  /  Alb  1.6<L>  /  TBili  0.6  /  DBili  0.0  /  AST  67<H>  /  ALT  31  /  AlkPhos  55      LIVER FUNCTIONS - ( 2022 05:00 )  Alb: 1.6 g/dL / Pro: 6.5 g/dL / ALK PHOS: 55 U/L / ALT: 31 U/L / AST: 67 U/L / GGT: x           Urinalysis Basic - ( 10 Jul 2022 23:00 )    Color: Yellow / Appearance: Clear / S.015 / pH: x  Gluc: x / Ketone: Negative  / Bili: Negative / Urobili: Negative   Blood: x / Protein: 100 / Nitrite: Positive   Leuk Esterase: Negative / RBC: 5-10 /HPF / WBC 0-2 /HPF   Sq Epi: x / Non Sq Epi: Neg.-Few / Bacteria: Few /HPF      MICROBIOLOGY:  RECENT CULTURES:  07-10 @ 23:00 Clean Catch Clean Catch (Midstream)     <10,000 CFU/mL Normal Urogenital Namita      07-10 @ 17:45 .Blood Blood-Peripheral     No growth to date.          RADIOLOGY REVIEWED:  < from: CT Head No Cont (07.10.22 @ 19:42) >  IMPRESSION:    No acute intracranial hemorrhage, hydrocephalus or extra-axial fluid   collection.  Moderate parenchymal volume loss and moderate chronic microvascular   ischemic changes.  No CT evidence for acute transcortical infarction. Please note that MR   imaging is a more sensitive imaging modality for detection of acute   infarction and may be obtained as clinically warranted.    If there are questions/need for clarification regarding this report, Dr. Murphy Hensley can be best reached via the patient secure hospital email   system at [email or web address].    < end of copied text >  < from: US Duplex Venous Lower Ext Complete, Bilateral (22 @ 16:50) >  IMPRESSION:  No evidence of deep venous thrombosis in the visualized bilateral lower   extremity veins.    --- End of Report ---    CTA official reading pending    
MULU PATEL  [id number]      Chief Complaint: COVID-19/Altered mental status/Abnormal ECG/Elevated troponin    Interval History: The patient remains altered, not able to answer questions appropriately.     Tele: not on telemetry     Current meds:   acetaminophen     Tablet .. 650 milliGRAM(s) Oral every 6 hours PRN  ALBUTerol    90 MICROgram(s) HFA Inhaler 1 Puff(s) Inhalation every 4 hours PRN  ascorbic acid 500 milliGRAM(s) Oral daily  cholecalciferol 2000 Unit(s) Oral daily  dexAMETHasone  Injectable 6 milliGRAM(s) IV Push daily  dextrose 5% + sodium chloride 0.45%. 1000 milliLiter(s) IV Continuous <Continuous>  enoxaparin Injectable 40 milliGRAM(s) SubCutaneous every 24 hours  fludroCORTISONE 0.1 milliGRAM(s) Oral two times a day  latanoprost 0.005% Ophthalmic Solution 1 Drop(s) Both EYES at bedtime  midodrine. 10 milliGRAM(s) Oral three times a day  multivitamin 1 Tablet(s) Oral daily  pantoprazole    Tablet 40 milliGRAM(s) Oral before breakfast  polyethylene glycol 3350 17 Gram(s) Oral daily  potassium chloride  10 mEq/100 mL IVPB 10 milliEquivalent(s) IV Intermittent every 1 hour  remdesivir  IVPB   IV Intermittent   remdesivir  IVPB 100 milliGRAM(s) IV Intermittent every 24 hours  senna 2 Tablet(s) Oral at bedtime  zinc sulfate 220 milliGRAM(s) Oral daily      Objective:     Vital Signs:   T(C): 37.1 (07-13-22 @ 06:05), Max: 37.1 (07-13-22 @ 06:05)  HR: 66 (07-13-22 @ 06:05) (64 - 70)  BP: 153/70 (07-13-22 @ 06:05) (143/64 - 154/55)  RR: 16 (07-13-22 @ 06:05) (16 - 18)  SpO2: 92% (07-13-22 @ 06:05) (92% - 95%)  Wt(kg): --    PHYSICAL EXAM:  General: elderly, frail woman, altered  HEENT: sclera anicteric  Neck: supple  CVS: JVP ~ 7 cm H20, RRR, s1, s2, no murmurs/rubs/gallops  Chest: unlabored respirations, clear to auscultation b/l  Extremities: no lower extremity edema b/l  Neuro: awake, alert, not oriented         Labs:   13 Jul 2022 06:10    141    |  106    |  23     ----------------------------<  121    3.0     |  27     |  0.77     Ca    9.3        13 Jul 2022 06:10  Mg     1.6       13 Jul 2022 06:10    TPro  6.2    /  Alb  2.1    /  TBili  0.5    /  DBili  0.1    /  AST  43     /  ALT  33     /  AlkPhos  54     13 Jul 2022 06:10                          11.2   7.57  )-----------( 197      ( 13 Jul 2022 06:10 )             33.9             ECG (7/10/22): sinus rhythm, PACs, bifascicular block, anteroseptal and lateral T wave inversions/abnormalities (RBBB and ST changes appears new)     TTE (7/12/22):   1. Normal global left ventricular systolic function.   2. Left ventricular ejection fraction, by visual estimation, is 65 to 70%.   3. Decreased left ventricular internal cavity size.   4. Moderately increased LV wall thickness.   5. The right ventricle appears to have normal systolic function.   6. The left atrium appears top normal in size.   7. The right atrium appears generally normal in size.   8. Mild thickening of the anterior and posterior mitral valve leaflets.   9. Mild mitral valve regurgitation.  10. Mild-moderate tricuspid regurgitation.  11. Mild aortic valve leaflet calcification. There appears to be   turbulent flow in the LVOT. No aortic valve stenosis.  12. Mild to moderate aortic regurgitation.  13. Small pericardial effusion.    CT Head (7/10/22):  No acute intracranial hemorrhage, hydrocephalus or extra-axial fluid   collection. Moderate parenchymal volume loss and moderate chronic microvascular ischemic changes. No CT evidence for acute transcortical infarction. Please note that MR  imaging is a more sensitive imaging modality for detection of acute  infarction and may be obtained as clinically warranted.      No prior cardiac workup in chart  
CC: f/u for Covid    Patient reports: she is lethargic, appears comfortable on RA    REVIEW OF SYSTEMS:  All other review of systems negative (Comprehensive ROS): poorly interactive    Antimicrobials Day #  :day 3  remdesivir  IVPB   IV Intermittent   remdesivir  IVPB 100 milliGRAM(s) IV Intermittent every 24 hours    Other Medications Reviewed  MEDICATIONS  (STANDING):  ascorbic acid 500 milliGRAM(s) Oral daily  cholecalciferol 2000 Unit(s) Oral daily  dexAMETHasone  Injectable 6 milliGRAM(s) IV Push daily  dextrose 5% + sodium chloride 0.45% with potassium chloride 20 mEq/L 1000 milliLiter(s) (50 mL/Hr) IV Continuous <Continuous>  enoxaparin Injectable 40 milliGRAM(s) SubCutaneous every 24 hours  fludroCORTISONE 0.1 milliGRAM(s) Oral two times a day  latanoprost 0.005% Ophthalmic Solution 1 Drop(s) Both EYES at bedtime  midodrine. 5 milliGRAM(s) Oral three times a day  multivitamin 1 Tablet(s) Oral daily  pantoprazole    Tablet 40 milliGRAM(s) Oral before breakfast  polyethylene glycol 3350 17 Gram(s) Oral daily  remdesivir  IVPB   IV Intermittent   remdesivir  IVPB 100 milliGRAM(s) IV Intermittent every 24 hours  senna 2 Tablet(s) Oral at bedtime  zinc sulfate 220 milliGRAM(s) Oral daily    T(F): 98.4 (07-13-22 @ 16:06), Max: 98.8 (07-13-22 @ 06:05)  HR: 84 (07-13-22 @ 16:06)  BP: 175/76 (07-13-22 @ 16:06)  RR: 16 (07-13-22 @ 16:06)  SpO2: 94% (07-13-22 @ 16:06)  Wt(kg): --    PHYSICAL EXAM:  General: lethargic,, no acute distress, frail and chronically ill apearing  Eyes:  anicteric, no conjunctival injection, no discharge  Oropharynx: no lesions or injection 	  Neck: supple, without adenopathy  Lungs: clear to auscultation  Heart: regular rate and rhythm; no murmur, rubs or gallops  Abdomen: soft, nondistended, nontender, without mass or organomegaly  Skin: no lesions  Extremities: no clubbing, cyanosis, or edema  Neurologic: sleepy, poorly interactive    LAB RESULTS:                        11.2   7.57  )-----------( 197      ( 13 Jul 2022 06:10 )             33.9     07-13    141  |  106  |  23  ----------------------------<  121<H>  3.0<L>   |  27  |  0.77    Ca    9.3      13 Jul 2022 06:10  Mg     1.6     07-13    TPro  6.2  /  Alb  2.1<L>  /  TBili  0.5  /  DBili  0.1  /  AST  43<H>  /  ALT  33  /  AlkPhos  54  07-13    LIVER FUNCTIONS - ( 13 Jul 2022 06:10 )  Alb: 2.1 g/dL / Pro: 6.2 g/dL / ALK PHOS: 54 U/L / ALT: 33 U/L / AST: 43 U/L / GGT: x             MICROBIOLOGY:  RECENT CULTURES:  07-10 @ 23:00 Clean Catch Clean Catch (Midstream)     <10,000 CFU/mL Normal Urogenital Namita      07-10 @ 17:45 .Blood Blood-Peripheral     No growth to date.          RADIOLOGY REVIEWED:  < from: CT Angio Chest PE Protocol w/ IV Cont (07.12.22 @ 12:52) >  IMPRESSION:  No large/central pulmonary embolism identified.    Bilateral scattered groundglass and patchy opacities, particularly within   the left upper lobe and right middle lobe. Small bilateral loculated   pleural effusions with associated compressive atelectasis. Question   superimposed pneumonia in the left lower lobe. Follow-up to resolution is   recommended.    Multiple additional findings as above.    --- End of Report ---    < end of copied text >    
MULU PATEL  [id number]      Chief Complaint: COVID-19/Altered mental status/Abnormal ECG/Elevated troponin    Interval History: The patient remains altered and not able to answer questions appropriately.      Tele: not on telemetry     Current meds:   acetaminophen     Tablet .. 650 milliGRAM(s) Oral every 6 hours PRN  ALBUTerol    90 MICROgram(s) HFA Inhaler 1 Puff(s) Inhalation every 4 hours PRN  ascorbic acid 500 milliGRAM(s) Oral daily  cholecalciferol 2000 Unit(s) Oral daily  dexAMETHasone  Injectable 6 milliGRAM(s) IV Push daily  dextrose 5%. 1000 milliLiter(s) IV Continuous <Continuous>  enoxaparin Injectable 40 milliGRAM(s) SubCutaneous every 24 hours  fludroCORTISONE 0.1 milliGRAM(s) Oral two times a day  lactated ringers 1000 milliLiter(s) IV Continuous <Continuous>  latanoprost 0.005% Ophthalmic Solution 1 Drop(s) Both EYES at bedtime  midodrine. 10 milliGRAM(s) Oral three times a day  multivitamin 1 Tablet(s) Oral daily  pantoprazole    Tablet 40 milliGRAM(s) Oral before breakfast  polyethylene glycol 3350 17 Gram(s) Oral daily  remdesivir  IVPB   IV Intermittent   remdesivir  IVPB 100 milliGRAM(s) IV Intermittent every 24 hours  senna 2 Tablet(s) Oral at bedtime  zinc sulfate 220 milliGRAM(s) Oral daily      Objective:     Vital Signs:   T(C): 36.3 (07-12-22 @ 04:57), Max: 36.8 (07-11-22 @ 16:19)  HR: 66 (07-12-22 @ 04:57) (56 - 66)  BP: 133/50 (07-12-22 @ 04:57) (132/51 - 138/46)  RR: 16 (07-12-22 @ 04:57) (16 - 18)  SpO2: 97% (07-12-22 @ 04:57) (95% - 97%)  Wt(kg): --      PHYSICAL EXAM:  General: elderly, frail woman, altered  HEENT: sclera anicteric  Neck: supple  CVS: JVP ~ 7 cm H20, RRR, s1, s2, no murmurs/rubs/gallops  Chest: unlabored respirations, clear to auscultation b/l  Extremities: no lower extremity edema b/l  Neuro: awake, alert, not oriented         Labs:   12 Jul 2022 05:00    x      |  x      |  x      ----------------------------<  x      x       |  x      |  0.77     Ca    9.6        11 Jul 2022 07:30  Mg     1.8       11 Jul 2022 07:30    TPro  6.5    /  Alb  1.6    /  TBili  0.6    /  DBili  0.0    /  AST  67     /  ALT  31     /  AlkPhos  55     12 Jul 2022 05:00                          12.4   14.51 )-----------( 146      ( 11 Jul 2022 07:30 )             39.3     PT/INR - ( 10 Jul 2022 17:45 )   PT: 11.8 sec;   INR: 1.02 ratio         PTT - ( 10 Jul 2022 17:45 )  PTT:28.6 sec            ECG (7/10/22): sinus rhythm, PACs, bifascicular block, anteroseptal and lateral T wave inversions/abnormalities (RBBB and ST changes appears new)     CT Head (7/10/22):  No acute intracranial hemorrhage, hydrocephalus or extra-axial fluid   collection. Moderate parenchymal volume loss and moderate chronic microvascular ischemic changes. No CT evidence for acute transcortical infarction. Please note that MR  imaging is a more sensitive imaging modality for detection of acute  infarction and may be obtained as clinically warranted.      No prior cardiac workup in chart  
  Progress: no improvment    Present Symptoms:   Dyspnea: y  Nausea/Vomiting: n  Anxiety:  n  Depressed Mood: n  Fatigue: y  Loss of appetite: y  Pain:      no apparent            MEDICATIONS  (STANDING):  ascorbic acid 500 milliGRAM(s) Oral daily  cholecalciferol 2000 Unit(s) Oral daily  dexAMETHasone  Injectable 6 milliGRAM(s) IV Push daily  dextrose 5%. 1000 milliLiter(s) (60 mL/Hr) IV Continuous <Continuous>  enoxaparin Injectable 40 milliGRAM(s) SubCutaneous every 24 hours  fludroCORTISONE 0.1 milliGRAM(s) Oral two times a day  latanoprost 0.005% Ophthalmic Solution 1 Drop(s) Both EYES at bedtime  midodrine. 10 milliGRAM(s) Oral three times a day  multivitamin 1 Tablet(s) Oral daily  pantoprazole    Tablet 40 milliGRAM(s) Oral before breakfast  polyethylene glycol 3350 17 Gram(s) Oral daily  potassium chloride  10 mEq/100 mL IVPB 10 milliEquivalent(s) IV Intermittent every 1 hour  remdesivir  IVPB   IV Intermittent   remdesivir  IVPB 100 milliGRAM(s) IV Intermittent every 24 hours  senna 2 Tablet(s) Oral at bedtime  zinc sulfate 220 milliGRAM(s) Oral daily    MEDICATIONS  (PRN):  acetaminophen     Tablet .. 650 milliGRAM(s) Oral every 6 hours PRN Temp greater or equal to 38C (100.4F), Mild Pain (1 - 3)  ALBUTerol    90 MICROgram(s) HFA Inhaler 1 Puff(s) Inhalation every 4 hours PRN Bronchospasm      PHYSICAL EXAM:  Vital Signs Last 24 Hrs  T(C): 36.3 (2022 04:57), Max: 36.8 (2022 16:19)  T(F): 97.4 (2022 04:57), Max: 98.2 (2022 16:19)  HR: 66 (2022 04:57) (56 - 66)  BP: 133/50 (2022 04:57) (132/51 - 138/46)  BP(mean): --  RR: 16 (2022 04:57) (16 - 18)  SpO2: 97% (2022 04:57) (95% - 97%)    Parameters below as of 2022 04:57  Patient On (Oxygen Delivery Method): nasal cannula  O2 Flow (L/min): 4    General: alert  oriented x __1__      HEENT: normal    Lungs:coarse bs  bilat  CV: normal    GI: normal    : normal    Musculoskeletal: profound weaknss  Skin: normal    Neuro: no deficits   Oral intake ability:  oral feeding  Diet: NPO,     LABS:                          12.7   9.92  )-----------( 198      ( 2022 10:21 )             39.2     07-12    141  |  105  |  25<H>  ----------------------------<  122<H>  3.0<L>   |  30  |  0.79    Ca    9.7      2022 10:21  Mg     1.8     07-11    TPro  6.5  /  Alb  1.6<L>  /  TBili  0.6  /  DBili  0.0  /  AST  67<H>  /  ALT  31  /  AlkPhos  55  07-12    Urinalysis Basic - ( 10 Jul 2022 23:00 )    Color: Yellow / Appearance: Clear / S.015 / pH: x  Gluc: x / Ketone: Negative  / Bili: Negative / Urobili: Negative   Blood: x / Protein: 100 / Nitrite: Positive   Leuk Esterase: Negative / RBC: 5-10 /HPF / WBC 0-2 /HPF   Sq Epi: x / Non Sq Epi: Neg.-Few / Bacteria: Few /HPF        RADIOLOGY & ADDITIONAL STUDIES:    ADVANCE DIRECTIVES:  Advanced Care Planning discussion total time spent:  20
Patient is a 89y old  Female who presents with a chief complaint of AMS (13 Jul 2022 17:37)      INTERVAL HPI/OVERNIGHT EVENTS: alert  and confused      REVIEW OF SYSTEMS:  CONSTITUTIONAL: No fever, weight loss, or fatigue  EYES: No eye pain, visual disturbances, or discharge  ENMT:  No difficulty hearing, tinnitus, vertigo; No sinus or throat pain  NECK: No pain or stiffness  BREASTS: No pain, masses, or nipple discharge  RESPIRATORY: No cough, wheezing, chills or hemoptysis; No shortness of breath  CARDIOVASCULAR: No chest pain, palpitations, dizziness, or leg swelling  GASTROINTESTINAL: No abdominal or epigastric pain. No nausea, vomiting, or hematemesis; No diarrhea or constipation. No melena or hematochezia.  GENITOURINARY: No dysuria, frequency, hematuria, or incontinence  NEUROLOGICAL: No headaches, memory loss, loss of strength, numbness, or tremors  SKIN: No itching, burning, rashes, or lesions   LYMPH NODES: No enlarged glands  ENDOCRINE: No heat or cold intolerance; No hair loss  MUSCULOSKELETAL: No joint pain or swelling; No muscle, back, or extremity pain  PSYCHIATRIC: No depression, anxiety, mood swings, or difficulty sleeping  HEME/LYMPH: No easy bruising, or bleeding gums  ALLERY AND IMMUNOLOGIC: No hives or eczema  FAMILY HISTORY:  FH: prostate cancer  father      T(C): 36.3 (07-14-22 @ 05:06), Max: 36.9 (07-13-22 @ 16:06)  HR: 92 (07-14-22 @ 05:06) (67 - 92)  BP: 142/69 (07-14-22 @ 05:06) (142/69 - 175/76)  RR: 16 (07-14-22 @ 05:06) (16 - 16)  SpO2: 93% (07-14-22 @ 05:06) (93% - 95%)  Wt(kg): --Vital Signs Last 24 Hrs  T(C): 36.3 (14 Jul 2022 05:06), Max: 36.9 (13 Jul 2022 16:06)  T(F): 97.4 (14 Jul 2022 05:06), Max: 98.4 (13 Jul 2022 16:06)  HR: 92 (14 Jul 2022 05:06) (67 - 92)  BP: 142/69 (14 Jul 2022 05:06) (142/69 - 175/76)  BP(mean): --  RR: 16 (14 Jul 2022 05:06) (16 - 16)  SpO2: 93% (14 Jul 2022 05:06) (93% - 95%)    Parameters below as of 14 Jul 2022 05:06  Patient On (Oxygen Delivery Method): room air        T(F): 97.4 (07-14-22 @ 05:06), Max: 98.8 (07-13-22 @ 06:05)  HR: 92 (07-14-22 @ 05:06) (56 - 92)  BP: 142/69 (07-14-22 @ 05:06) (132/51 - 175/76)  RR: 16 (07-14-22 @ 05:06) (16 - 18)  SpO2: 93% (07-14-22 @ 05:06) (92% - 97%)    PHYSICAL EXAM:  GENERAL: NAD, well-groomed, well-developed  HEAD:  Atraumatic, Normocephalic  EYES: EOMI, PERRLA, conjunctiva and sclera clear  ENMT: No tonsillar erythema, exudates, or enlargement; Moist mucous membranes, Good dentition, No lesions  NECK: Supple, No JVD, Normal thyroid  NERVOUS SYSTEM:  Alert & Oriented X3, Good concentration; Motor Strength 5/5 B/L upper and lower extremities; DTRs 2+ intact and symmetric  CHEST/LUNG: Clear to percussion bilaterally; No rales, rhonchi, wheezing, or rubs  HEART: Regular rate and rhythm; No murmurs, rubs, or gallops  ABDOMEN: Soft, Nontender, Nondistended; Bowel sounds present  EXTREMITIES:  2+ Peripheral Pulses, No clubbing, cyanosis, or edema  LYMPH: No lymphadenopathy noted  SKIN: No rashes or lesions    Consultant(s) Notes Reviewed:  [x ] YES  [ ] NO  Care Discussed with Consultants/Other Providers [ x] YES  [ ] NO    LABS:  07-14    145  |  104  |  22  ----------------------------<  152<H>  3.1<L>   |  30  |  0.92    Ca    9.6      14 Jul 2022 06:00  Mg     1.7     07-14    TPro  7.5  /  Alb  2.7<L>  /  TBili  0.8  /  DBili  0.2  /  AST  43<H>  /  ALT  41  /  AlkPhos  65  07-14                          13.8   13.21 )-----------( 282      ( 14 Jul 2022 06:00 )             42.4       Thyroid Stimulating Hormone, Serum: 0.114 uIU/mL (07-11 @ 07:30)      LIVER FUNCTIONS - ( 14 Jul 2022 06:00 )  Alb: 2.7 g/dL / Pro: 7.5 g/dL / ALK PHOS: 65 U/L / ALT: 41 U/L / AST: 43 U/L / GGT: x                            13.8   13.21 )-----------( 282      ( 07-14 @ 06:00 )             42.4                11.2   7.57  )-----------( 197      ( 07-13 @ 06:10 )             33.9                12.7   9.92  )-----------( 198      ( 07-12 @ 10:21 )             39.2                12.4   14.51 )-----------( 146      ( 07-11 @ 07:30 )             39.3                14.7   8.72  )-----------( 215      ( 07-10 @ 17:45 )             47.0               RADIOLOGY & ADDITIONAL TESTS:    Imaging Personally Reviewed:  [ ] YES  [ ] NO  acetaminophen     Tablet .. 650 milliGRAM(s) Oral every 6 hours PRN  ALBUTerol    90 MICROgram(s) HFA Inhaler 1 Puff(s) Inhalation every 4 hours PRN  ascorbic acid 500 milliGRAM(s) Oral daily  cholecalciferol 2000 Unit(s) Oral daily  dexAMETHasone  Injectable 6 milliGRAM(s) IV Push daily  dextrose 5% + sodium chloride 0.45% with potassium chloride 20 mEq/L 1000 milliLiter(s) IV Continuous <Continuous>  enoxaparin Injectable 40 milliGRAM(s) SubCutaneous every 24 hours  fludroCORTISONE 0.1 milliGRAM(s) Oral two times a day  latanoprost 0.005% Ophthalmic Solution 1 Drop(s) Both EYES at bedtime  magnesium sulfate  IVPB 2 Gram(s) IV Intermittent once  midodrine. 5 milliGRAM(s) Oral three times a day  multivitamin 1 Tablet(s) Oral daily  pantoprazole    Tablet 40 milliGRAM(s) Oral before breakfast  polyethylene glycol 3350 17 Gram(s) Oral daily  potassium chloride  10 mEq/100 mL IVPB 10 milliEquivalent(s) IV Intermittent every 1 hour  remdesivir  IVPB   IV Intermittent   remdesivir  IVPB 100 milliGRAM(s) IV Intermittent every 24 hours  senna 2 Tablet(s) Oral at bedtime  zinc sulfate 220 milliGRAM(s) Oral daily      HEALTH ISSUES - PROBLEM Dx:  Dehydration fever    2019 novel coronavirus disease (COVID-19)    Acute hypernatremia          
  Progress: appears more comfortqble    Present Symptoms:   Dyspnea: n  Nausea/Vomiting: n  Anxiety:  n  Depressed Mood: n  Fatigue: n  Loss of appetite: n  Pain:no apparent                   location:   Review of Systems: [All others negative or Unable to obtain due to poor mentation]    MEDICATIONS  (STANDING):  ascorbic acid 500 milliGRAM(s) Oral daily  cholecalciferol 2000 Unit(s) Oral daily  dexAMETHasone  Injectable 6 milliGRAM(s) IV Push daily  dextrose 5% + sodium chloride 0.45% with potassium chloride 20 mEq/L 1000 milliLiter(s) (50 mL/Hr) IV Continuous <Continuous>  enoxaparin Injectable 40 milliGRAM(s) SubCutaneous every 24 hours  fludroCORTISONE 0.1 milliGRAM(s) Oral two times a day  latanoprost 0.005% Ophthalmic Solution 1 Drop(s) Both EYES at bedtime  midodrine. 5 milliGRAM(s) Oral three times a day  multivitamin 1 Tablet(s) Oral daily  pantoprazole    Tablet 40 milliGRAM(s) Oral before breakfast  polyethylene glycol 3350 17 Gram(s) Oral daily  remdesivir  IVPB   IV Intermittent   remdesivir  IVPB 100 milliGRAM(s) IV Intermittent every 24 hours  senna 2 Tablet(s) Oral at bedtime  zinc sulfate 220 milliGRAM(s) Oral daily    MEDICATIONS  (PRN):  acetaminophen     Tablet .. 650 milliGRAM(s) Oral every 6 hours PRN Temp greater or equal to 38C (100.4F), Mild Pain (1 - 3)  ALBUTerol    90 MICROgram(s) HFA Inhaler 1 Puff(s) Inhalation every 4 hours PRN Bronchospasm      PHYSICAL EXAM:  Vital Signs Last 24 Hrs  T(C): 36.6 (13 Jul 2022 11:47), Max: 37.1 (13 Jul 2022 06:05)  T(F): 97.9 (13 Jul 2022 11:47), Max: 98.8 (13 Jul 2022 06:05)  HR: 67 (13 Jul 2022 11:47) (64 - 70)  BP: 160/73 (13 Jul 2022 11:47) (143/64 - 160/73)  BP(mean): --  RR: 16 (13 Jul 2022 11:47) (16 - 18)  SpO2: 95% (13 Jul 2022 11:47) (92% - 95%)    Parameters below as of 13 Jul 2022 11:47  Patient On (Oxygen Delivery Method): room air      General: alert  oriented x ____      HEENT: normal    Lungs: comfortable   CV: normal    GI: normal    : normal    Musculoskeletal: normal   Skin: normal    Neuro: no deficits   Oral intake ability:  oral feeding  Diet: NPO,     LABS:                          11.2   7.57  )-----------( 197      ( 13 Jul 2022 06:10 )             33.9     07-13    141  |  106  |  23  ----------------------------<  121<H>  3.0<L>   |  27  |  0.77    Ca    9.3      13 Jul 2022 06:10  Mg     1.6     07-13    TPro  6.2  /  Alb  2.1<L>  /  TBili  0.5  /  DBili  0.1  /  AST  43<H>  /  ALT  33  /  AlkPhos  54  07-13        RADIOLOGY & ADDITIONAL STUDIES:    ADVANCE DIRECTIVES:  Advanced Care Planning discussion total time spent:  
Patient is a 89y old  Female who presents with a chief complaint of AMS (12 Jul 2022 17:31)      INTERVAL HPI/OVERNIGHT EVENTS: lethargic      REVIEW OF SYSTEMS:  CONSTITUTIONAL: No fever, weight loss, or fatigue  EYES: No eye pain, visual disturbances, or discharge  ENMT:  No difficulty hearing, tinnitus, vertigo; No sinus or throat pain  NECK: No pain or stiffness  BREASTS: No pain, masses, or nipple discharge  RESPIRATORY: No cough, wheezing, chills or hemoptysis; No shortness of breath  CARDIOVASCULAR: No chest pain, palpitations, dizziness, or leg swelling  GASTROINTESTINAL: No abdominal or epigastric pain. No nausea, vomiting, or hematemesis; No diarrhea or constipation. No melena or hematochezia.  GENITOURINARY: No dysuria, frequency, hematuria, or incontinence  NEUROLOGICAL: No headaches, memory loss, loss of strength, numbness, or tremors  SKIN: No itching, burning, rashes, or lesions   LYMPH NODES: No enlarged glands  ENDOCRINE: No heat or cold intolerance; No hair loss  MUSCULOSKELETAL: No joint pain or swelling; No muscle, back, or extremity pain  PSYCHIATRIC: No depression, anxiety, mood swings, or difficulty sleeping  HEME/LYMPH: No easy bruising, or bleeding gums  ALLERY AND IMMUNOLOGIC: No hives or eczema  FAMILY HISTORY:  FH: prostate cancer  father      T(C): 37.1 (07-13-22 @ 06:05), Max: 37.1 (07-13-22 @ 06:05)  HR: 66 (07-13-22 @ 06:05) (64 - 70)  BP: 153/70 (07-13-22 @ 06:05) (143/64 - 154/55)  RR: 16 (07-13-22 @ 06:05) (16 - 18)  SpO2: 92% (07-13-22 @ 06:05) (92% - 95%)  Wt(kg): --Vital Signs Last 24 Hrs  T(C): 37.1 (13 Jul 2022 06:05), Max: 37.1 (13 Jul 2022 06:05)  T(F): 98.8 (13 Jul 2022 06:05), Max: 98.8 (13 Jul 2022 06:05)  HR: 66 (13 Jul 2022 06:05) (64 - 70)  BP: 153/70 (13 Jul 2022 06:05) (143/64 - 154/55)  BP(mean): --  RR: 16 (13 Jul 2022 06:05) (16 - 18)  SpO2: 92% (13 Jul 2022 06:05) (92% - 95%)    Parameters below as of 13 Jul 2022 06:05  Patient On (Oxygen Delivery Method): nasal cannula  O2 Flow (L/min): 4      T(F): 98.8 (07-13-22 @ 06:05), Max: 102.7 (07-10-22 @ 17:41)  HR: 66 (07-13-22 @ 06:05) (56 - 105)  BP: 153/70 (07-13-22 @ 06:05) (109/55 - 154/55)  RR: 16 (07-13-22 @ 06:05) (16 - 37)  SpO2: 92% (07-13-22 @ 06:05) (78% - 99%)    PHYSICAL EXAM:  GENERAL: NAD, well-groomed, well-developed  HEAD:  Atraumatic, Normocephalic  EYES: EOMI, PERRLA, conjunctiva and sclera clear  ENMT: No tonsillar erythema, exudates, or enlargement; Moist mucous membranes, Good dentition, No lesions  NECK: Supple, No JVD, Normal thyroid  NERVOUS SYSTEM:  Alert & Oriented X3, Good concentration; Motor Strength 5/5 B/L upper and lower extremities; DTRs 2+ intact and symmetric  CHEST/LUNG: Clear to percussion bilaterally; No rales, rhonchi, wheezing, or rubs  HEART: Regular rate and rhythm; No murmurs, rubs, or gallops  ABDOMEN: Soft, Nontender, Nondistended; Bowel sounds present  EXTREMITIES:  2+ Peripheral Pulses, No clubbing, cyanosis, or edema  LYMPH: No lymphadenopathy noted  SKIN: No rashes or lesions    Consultant(s) Notes Reviewed:  [x ] YES  [ ] NO  Care Discussed with Consultants/Other Providers [ x] YES  [ ] NO    LABS:  07-13    141  |  106  |  23  ----------------------------<  121<H>  3.0<L>   |  27  |  0.77    Ca    9.3      13 Jul 2022 06:10  Mg     1.6     07-13    TPro  6.5  /  Alb  1.6<L>  /  TBili  0.6  /  DBili  0.0  /  AST  67<H>  /  ALT  31  /  AlkPhos  55  07-12                          11.2   7.57  )-----------( 197      ( 13 Jul 2022 06:10 )             33.9       Culture - Urine (collected 07-10-22 @ 23:00)  Source: Clean Catch Clean Catch (Midstream)  Final Report (07-12-22 @ 13:46):    <10,000 CFU/mL Normal Urogenital Namita    Culture - Blood (collected 07-10-22 @ 17:45)  Source: .Blood Blood-Peripheral  Preliminary Report (07-12-22 @ 01:02):    No growth to date.    Culture - Blood (collected 07-10-22 @ 17:45)  Source: .Blood Blood-Peripheral  Preliminary Report (07-12-22 @ 01:02):    No growth to date.      Thyroid Stimulating Hormone, Serum: 0.114 uIU/mL (07-11 @ 07:30)      LIVER FUNCTIONS - ( 12 Jul 2022 05:00 )  Alb: 1.6 g/dL / Pro: 6.5 g/dL / ALK PHOS: 55 U/L / ALT: 31 U/L / AST: 67 U/L / GGT: x                            11.2   7.57  )-----------( 197      ( 07-13 @ 06:10 )             33.9                12.7   9.92  )-----------( 198      ( 07-12 @ 10:21 )             39.2                12.4   14.51 )-----------( 146      ( 07-11 @ 07:30 )             39.3                14.7   8.72  )-----------( 215      ( 07-10 @ 17:45 )             47.0               RADIOLOGY & ADDITIONAL TESTS:    Imaging Personally Reviewed:  [ ] YES  [ ] NO  acetaminophen     Tablet .. 650 milliGRAM(s) Oral every 6 hours PRN  ALBUTerol    90 MICROgram(s) HFA Inhaler 1 Puff(s) Inhalation every 4 hours PRN  ascorbic acid 500 milliGRAM(s) Oral daily  cholecalciferol 2000 Unit(s) Oral daily  dexAMETHasone  Injectable 6 milliGRAM(s) IV Push daily  dextrose 5% + sodium chloride 0.45%. 1000 milliLiter(s) IV Continuous <Continuous>  enoxaparin Injectable 40 milliGRAM(s) SubCutaneous every 24 hours  fludroCORTISONE 0.1 milliGRAM(s) Oral two times a day  latanoprost 0.005% Ophthalmic Solution 1 Drop(s) Both EYES at bedtime  midodrine. 10 milliGRAM(s) Oral three times a day  multivitamin 1 Tablet(s) Oral daily  pantoprazole    Tablet 40 milliGRAM(s) Oral before breakfast  polyethylene glycol 3350 17 Gram(s) Oral daily  remdesivir  IVPB   IV Intermittent   remdesivir  IVPB 100 milliGRAM(s) IV Intermittent every 24 hours  senna 2 Tablet(s) Oral at bedtime  zinc sulfate 220 milliGRAM(s) Oral daily      HEALTH ISSUES - PROBLEM Dx:  Dehydration fever    2019 novel coronavirus disease (COVID-19)    Acute hypernatremia          
Patient is a 89y old  Female who presents with a chief complaint of AMS (11 Jul 2022 14:43)      INTERVAL HPI/OVERNIGHT EVENTS: unchanged      REVIEW OF SYSTEMS:  CONSTITUTIONAL: No fever, weight loss, or fatigue  EYES: No eye pain, visual disturbances, or discharge  ENMT:  No difficulty hearing, tinnitus, vertigo; No sinus or throat pain  NECK: No pain or stiffness  BREASTS: No pain, masses, or nipple discharge  RESPIRATORY: No cough, wheezing, chills or hemoptysis; No shortness of breath  CARDIOVASCULAR: No chest pain, palpitations, dizziness, or leg swelling  GASTROINTESTINAL: No abdominal or epigastric pain. No nausea, vomiting, or hematemesis; No diarrhea or constipation. No melena or hematochezia.  GENITOURINARY: No dysuria, frequency, hematuria, or incontinence  NEUROLOGICAL: No headaches, memory loss, loss of strength, numbness, or tremors  SKIN: No itching, burning, rashes, or lesions   LYMPH NODES: No enlarged glands  ENDOCRINE: No heat or cold intolerance; No hair loss  MUSCULOSKELETAL: No joint pain or swelling; No muscle, back, or extremity pain  PSYCHIATRIC: No depression, anxiety, mood swings, or difficulty sleeping  HEME/LYMPH: No easy bruising, or bleeding gums  ALLERY AND IMMUNOLOGIC: No hives or eczema  FAMILY HISTORY:  FH: prostate cancer  father      T(C): 36.3 (07-12-22 @ 04:57), Max: 36.8 (07-11-22 @ 16:19)  HR: 66 (07-12-22 @ 04:57) (56 - 66)  BP: 133/50 (07-12-22 @ 04:57) (132/51 - 138/46)  RR: 16 (07-12-22 @ 04:57) (16 - 18)  SpO2: 97% (07-12-22 @ 04:57) (95% - 97%)  Wt(kg): --Vital Signs Last 24 Hrs  T(C): 36.3 (12 Jul 2022 04:57), Max: 36.8 (11 Jul 2022 16:19)  T(F): 97.4 (12 Jul 2022 04:57), Max: 98.2 (11 Jul 2022 16:19)  HR: 66 (12 Jul 2022 04:57) (56 - 66)  BP: 133/50 (12 Jul 2022 04:57) (132/51 - 138/46)  BP(mean): --  RR: 16 (12 Jul 2022 04:57) (16 - 18)  SpO2: 97% (12 Jul 2022 04:57) (95% - 97%)    Parameters below as of 12 Jul 2022 04:57  Patient On (Oxygen Delivery Method): nasal cannula  O2 Flow (L/min): 4      T(F): 97.4 (07-12-22 @ 04:57), Max: 102.7 (07-10-22 @ 17:41)  HR: 66 (07-12-22 @ 04:57) (56 - 105)  BP: 133/50 (07-12-22 @ 04:57) (109/55 - 143/62)  RR: 16 (07-12-22 @ 04:57) (16 - 37)  SpO2: 97% (07-12-22 @ 04:57) (78% - 99%)    PHYSICAL EXAM:  GENERAL: NAD, well-groomed, well-developed  HEAD:  Atraumatic, Normocephalic  EYES: EOMI, PERRLA, conjunctiva and sclera clear  ENMT: No tonsillar erythema, exudates, or enlargement; Moist mucous membranes, Good dentition, No lesions  NECK: Supple, No JVD, Normal thyroid  NERVOUS SYSTEM:  Alert & Oriented X3, Good concentration; Motor Strength 5/5 B/L upper and lower extremities; DTRs 2+ intact and symmetric  CHEST/LUNG: Clear to percussion bilaterally; No rales, rhonchi, wheezing, or rubs  HEART: Regular rate and rhythm; No murmurs, rubs, or gallops  ABDOMEN: Soft, Nontender, Nondistended; Bowel sounds present  EXTREMITIES:  2+ Peripheral Pulses, No clubbing, cyanosis, or edema  LYMPH: No lymphadenopathy noted  SKIN: No rashes or lesions    Consultant(s) Notes Reviewed:  [x ] YES  [ ] NO  Care Discussed with Consultants/Other Providers [ x] YES  [ ] NO    LABS:  07-11    x   |  x   |  x   ----------------------------<  x   x    |  x   |  0.90    Ca    9.6      11 Jul 2022 07:30  Mg     1.8     07-11    TPro  6.4  /  Alb  2.0<L>  /  TBili  0.5  /  DBili  0.1  /  AST  57<H>  /  ALT  29  /  AlkPhos  48  07-11                          12.4   14.51 )-----------( 146      ( 11 Jul 2022 07:30 )             39.3       Culture - Blood (collected 07-10-22 @ 17:45)  Source: .Blood Blood-Peripheral  Preliminary Report (07-12-22 @ 01:02):    No growth to date.    Culture - Blood (collected 07-10-22 @ 17:45)  Source: .Blood Blood-Peripheral  Preliminary Report (07-12-22 @ 01:02):    No growth to date.      Thyroid Stimulating Hormone, Serum: 0.114 uIU/mL (07-11 @ 07:30)    PT/INR - ( 10 Jul 2022 17:45 )   PT: 11.8 sec;   INR: 1.02 ratio         PTT - ( 10 Jul 2022 17:45 )  PTT:28.6 sec  LIVER FUNCTIONS - ( 11 Jul 2022 15:40 )  Alb: 2.0 g/dL / Pro: 6.4 g/dL / ALK PHOS: 48 U/L / ALT: 29 U/L / AST: 57 U/L / GGT: x                            12.4   14.51 )-----------( 146      ( 07-11 @ 07:30 )             39.3                14.7   8.72  )-----------( 215      ( 07-10 @ 17:45 )             47.0               RADIOLOGY & ADDITIONAL TESTS:    Imaging Personally Reviewed:  [ ] YES  [ ] NO  acetaminophen     Tablet .. 650 milliGRAM(s) Oral every 6 hours PRN  ALBUTerol    90 MICROgram(s) HFA Inhaler 1 Puff(s) Inhalation every 4 hours PRN  ascorbic acid 500 milliGRAM(s) Oral daily  cholecalciferol 2000 Unit(s) Oral daily  dexAMETHasone  Injectable 6 milliGRAM(s) IV Push daily  dextrose 5%. 1000 milliLiter(s) IV Continuous <Continuous>  fludroCORTISONE 0.1 milliGRAM(s) Oral two times a day  heparin   Injectable 5000 Unit(s) SubCutaneous every 8 hours  lactated ringers 1000 milliLiter(s) IV Continuous <Continuous>  latanoprost 0.005% Ophthalmic Solution 1 Drop(s) Both EYES at bedtime  midodrine. 10 milliGRAM(s) Oral three times a day  multivitamin 1 Tablet(s) Oral daily  pantoprazole    Tablet 40 milliGRAM(s) Oral before breakfast  polyethylene glycol 3350 17 Gram(s) Oral daily  remdesivir  IVPB   IV Intermittent   remdesivir  IVPB 100 milliGRAM(s) IV Intermittent every 24 hours  senna 2 Tablet(s) Oral at bedtime  zinc sulfate 220 milliGRAM(s) Oral daily      HEALTH ISSUES - PROBLEM Dx:  Dehydration fever    2019 novel coronavirus disease (COVID-19)    Acute hypernatremia          
Patient is a 89y old  Female who presents with a chief complaint of AMS (10 Jul 2022 23:39)      INTERVAL HPI/OVERNIGHT EVENTS:  lethargic      REVIEW OF SYSTEMS:  CONSTITUTIONAL: No fever, weight loss, or fatigue  EYES: No eye pain, visual disturbances, or discharge  ENMT:  No difficulty hearing, tinnitus, vertigo; No sinus or throat pain  NECK: No pain or stiffness  BREASTS: No pain, masses, or nipple discharge  RESPIRATORY: No cough, wheezing, chills or hemoptysis; No shortness of breath  CARDIOVASCULAR: No chest pain, palpitations, dizziness, or leg swelling  GASTROINTESTINAL: No abdominal or epigastric pain. No nausea, vomiting, or hematemesis; No diarrhea or constipation. No melena or hematochezia.  GENITOURINARY: No dysuria, frequency, hematuria, or incontinence  NEUROLOGICAL: No headaches, memory loss, loss of strength, numbness, or tremors  SKIN: No itching, burning, rashes, or lesions   LYMPH NODES: No enlarged glands  ENDOCRINE: No heat or cold intolerance; No hair loss  MUSCULOSKELETAL: No joint pain or swelling; No muscle, back, or extremity pain  PSYCHIATRIC: No depression, anxiety, mood swings, or difficulty sleeping  HEME/LYMPH: No easy bruising, or bleeding gums  ALLERY AND IMMUNOLOGIC: No hives or eczema  FAMILY HISTORY:  FH: prostate cancer  father      T(C): 36.1 (07-11-22 @ 05:00), Max: 39.3 (07-10-22 @ 17:41)  HR: 68 (07-11-22 @ 05:00) (68 - 105)  BP: 131/64 (07-11-22 @ 05:00) (109/55 - 143/62)  RR: 16 (07-11-22 @ 05:00) (16 - 37)  SpO2: 97% (07-11-22 @ 05:00) (78% - 99%)  Wt(kg): --Vital Signs Last 24 Hrs  T(C): 36.1 (11 Jul 2022 05:00), Max: 39.3 (10 Jul 2022 17:41)  T(F): 97 (11 Jul 2022 05:00), Max: 102.7 (10 Jul 2022 17:41)  HR: 68 (11 Jul 2022 05:00) (68 - 105)  BP: 131/64 (11 Jul 2022 05:00) (109/55 - 143/62)  BP(mean): 72 (10 Jul 2022 17:41) (72 - 72)  RR: 16 (11 Jul 2022 05:00) (16 - 37)  SpO2: 97% (11 Jul 2022 05:00) (78% - 99%)    Parameters below as of 11 Jul 2022 05:00  Patient On (Oxygen Delivery Method): nasal cannula  O2 Flow (L/min): 4      T(F): 97 (07-11-22 @ 05:00), Max: 102.7 (07-10-22 @ 17:41)  HR: 68 (07-11-22 @ 05:00) (68 - 105)  BP: 131/64 (07-11-22 @ 05:00) (109/55 - 143/62)  RR: 16 (07-11-22 @ 05:00) (16 - 37)  SpO2: 97% (07-11-22 @ 05:00) (78% - 99%)    PHYSICAL EXAM:  GENERAL: NAD, well-groomed, well-developed  HEAD:  Atraumatic, Normocephalic  EYES: EOMI, PERRLA, conjunctiva and sclera clear  ENMT: No tonsillar erythema, exudates, or enlargement; Moist mucous membranes, Good dentition, No lesions  NECK: Supple, No JVD, Normal thyroid  NERVOUS SYSTEM:  Alert & Oriented X3, Good concentration; Motor Strength 5/5 B/L upper and lower extremities; DTRs 2+ intact and symmetric  CHEST/LUNG: Clear to percussion bilaterally; No rales, rhonchi, wheezing, or rubs  HEART: Regular rate and rhythm; No murmurs, rubs, or gallops  ABDOMEN: Soft, Nontender, Nondistended; Bowel sounds present  EXTREMITIES:  2+ Peripheral Pulses, No clubbing, cyanosis, or edema  LYMPH: No lymphadenopathy noted  SKIN: No rashes or lesions    Consultant(s) Notes Reviewed:  [x ] YES  [ ] NO  Care Discussed with Consultants/Other Providers [ x] YES  [ ] NO    LABS:  07-10    151<H>  |  114<H>  |  43<H>  ----------------------------<  124<H>  3.8   |  30  |  1.40<H>    Ca    9.9      10 Jul 2022 19:55    TPro  6.9  /  Alb  2.4<L>  /  TBili  0.8  /  DBili  x   /  AST  60<H>  /  ALT  36  /  AlkPhos  55  07-10                          12.4   14.51 )-----------( 146      ( 11 Jul 2022 07:30 )             39.3         PT/INR - ( 10 Jul 2022 17:45 )   PT: 11.8 sec;   INR: 1.02 ratio         PTT - ( 10 Jul 2022 17:45 )  PTT:28.6 sec  LIVER FUNCTIONS - ( 10 Jul 2022 19:55 )  Alb: 2.4 g/dL / Pro: 6.9 g/dL / ALK PHOS: 55 U/L / ALT: 36 U/L / AST: 60 U/L / GGT: x                            12.4   14.51 )-----------( 146      ( 07-11 @ 07:30 )             39.3                14.7   8.72  )-----------( 215      ( 07-10 @ 17:45 )             47.0               RADIOLOGY & ADDITIONAL TESTS:    Imaging Personally Reviewed:  [ ] YES  [ ] NO  acetaminophen     Tablet .. 650 milliGRAM(s) Oral every 6 hours PRN  ALBUTerol    90 MICROgram(s) HFA Inhaler 1 Puff(s) Inhalation every 4 hours PRN  ascorbic acid 500 milliGRAM(s) Oral daily  cholecalciferol 2000 Unit(s) Oral daily  dexAMETHasone  Injectable 6 milliGRAM(s) IV Push daily  dextrose 5%. 1000 milliLiter(s) IV Continuous <Continuous>  fludroCORTISONE 0.1 milliGRAM(s) Oral two times a day  heparin   Injectable 5000 Unit(s) SubCutaneous every 8 hours  lactated ringers 1000 milliLiter(s) IV Continuous <Continuous>  latanoprost 0.005% Ophthalmic Solution 1 Drop(s) Both EYES at bedtime  midodrine. 10 milliGRAM(s) Oral three times a day  multivitamin 1 Tablet(s) Oral daily  pantoprazole    Tablet 40 milliGRAM(s) Oral before breakfast  polyethylene glycol 3350 17 Gram(s) Oral daily  senna 2 Tablet(s) Oral at bedtime  zinc sulfate 220 milliGRAM(s) Oral daily      HEALTH ISSUES - PROBLEM Dx:

## 2022-07-14 NOTE — PROGRESS NOTE ADULT - PROBLEM SELECTOR PLAN 1
gentle hydration  decadron ID consult / consider palliative care consult
improved with hydration
ID note appreciated remdesivir started continue IV decadron
continue protocol   prognosis poor
continue protocol   prognosis poor
ID note appreciated remdesivir started continue IV decadron

## 2022-07-14 NOTE — PROGRESS NOTE ADULT - PROBLEM SELECTOR PROBLEM 2
2019 novel coronavirus disease (COVID-19)
2019 novel coronavirus disease (COVID-19)
Acute hypernatremia
2019 novel coronavirus disease (COVID-19)
2019 novel coronavirus disease (COVID-19)
Acute hypernatremia

## 2022-07-14 NOTE — PROGRESS NOTE ADULT - PROBLEM SELECTOR PROBLEM 1
2019 novel coronavirus disease (COVID-19)
Dehydration fever
2019 novel coronavirus disease (COVID-19)
Dehydration fever

## 2022-07-14 NOTE — PROGRESS NOTE ADULT - PROVIDER SPECIALTY LIST ADULT
Cardiology
Infectious Disease
Internal Medicine
Cardiology
Nephrology
Infectious Disease
Internal Medicine
Palliative Care
Internal Medicine
Internal Medicine
Palliative Care

## 2022-07-14 NOTE — PROGRESS NOTE ADULT - TIME BILLING
chart reviewed and patient examined
chart reviewed and pt examined
chart reviewed and patient examined

## 2022-09-08 NOTE — PHYSICAL THERAPY INITIAL EVALUATION ADULT - GAIT DEVIATIONS NOTED, PT EVAL
LMOM to return call to the office. Provided pt office phone [phone] along with office hours. Left message to inform patient about call with Lubna Segovia from [facility].  See message below decreased step length/decreased brigid

## 2022-09-19 NOTE — SWALLOW BEDSIDE ASSESSMENT ADULT - SWALLOW EVAL: RECOMMENDED FEEDING/EATING TECHNIQUES
never used allow for swallow between intakes/small sips/bites/position upright (90 degrees)/crush medication (when feasible)/maintain upright posture during/after eating for 30 mins/alternate food with liquid

## 2022-10-06 NOTE — ED PROVIDER NOTE - NEUROLOGICAL LEVEL OF CONSCIOUSNESS
-- Message is from [facility] [employer] ([facility]) --    Patient has been overbooked for a Post Hospital Follow Up with the following provider:  Provider: Gabriella Hernandez  Date: 10/10/22  Time: 3:20 pm    Patient has been overbooked. Please call mom to see if patient can be seen with sibling who was double booked also for 10/12/22 at 5:40 pm. Mom call back number is [phone]   CONFUSED/baseline as per son/alert/follows commands

## 2023-01-10 NOTE — ED ADULT NURSE NOTE - COMFORT/ACCEPTABLE PAIN LEVEL (0-10)
[id number] [facility] Neurology   [address] [facility]    CONSULT NOTE      CHIEF COMPLAINT:       L sided weakness    HISTORY OF PRESENT ILLNESS:       The patient is a [de-identified] y.o. female who presents with left upper extremity numbness and weakness, left facial droop and altered mentation. Patient has a past medical history of CAD/PVD on aspirin and Lipitor as well as hypothyroidism on Synthroid. Patient recently underwent fixation of right ankle by orthopedic surgery, continued with aspirin throughout. Patient presented from acute rehab unit after they had noticed that her left upper extremity was weak and on evaluation some numbness was present. She was taken to [facility] ED where she was evaluated for potential stroke. CT head was unremarkable for any acute changes, CTA did not show a left ICA aneurysm but no LVO or critical stenosis. At this time films are not available on PACS, read based on report. Of note patient's sodium was found to be 116 on initial labs. Last known well reportedly around 9 AM.  She was transferred to [facility] ED for medical ICU admission for management of hyponatremia. Upon arrival to [facility], patient's left-sided weakness and numbness had resolved. Patient does appear encephalopathic and requires repeated stimulation to arouse. Initial NIH was 7 for level of consciousness, commands and questions, mild dysmetria in general but equal mild weakness of both legs. With repeated prompting, patient was able to maintain both arms in the air for the full 10 seconds. Initial blood pressure 118, blood glucose 125. Of note patient is on tramadol 50 mg twice daily chronically.                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 Jaja Parks        Social History:   Social History     Socioeconomic History    Marital status:      Spouse name: Not on file    Number of children: 2    Years of education: Not on file    Highest education level: Not on file   Occupational History    Occupation:    Tobacco Use    Smoking status: Former     Packs/day: 0.50     Years: 11.00     Pack years: 5.50     Types: Cigarettes     Quit date: 3/26/2008     Years since quittin.8    Smokeless tobacco: Never   Substance and Sexual Activity    Alcohol use: No    Drug use: No    Sexual activity: Not Currently   Other Topics Concern    Not on file   Social History Narrative    Not on file     Social Determinants of Health     Financial Resource Strain: Low Risk     Difficulty of Paying Living Expenses: Not hard at all   Food Insecurity: No Food Insecurity    Worried About Running Out of Food in the Last Year: Never true    Ran Out of Food in the Last Year: Never true   Transportation Needs: Not on file   Physical Activity: Inactive    Days of Exercise per Week: 0 days    Minutes of Exercise per Session: 0 min   Stress: Not on file   Social Connections: Not on file   Intimate Partner Violence: Not on file   Housing Stability: Not on file       Family History:       Adopted: Yes       Allergies:  Doxycycline, Pcn [penicillins], Pneumococcal vaccines, and Tetanus toxoids    Home Medications:  Prior to Admission medications    Medication Sig Start Date End Date Taking?  Authorizing Provider   vitamin C (ASCORBIC ACID) 500 MG tablet Take 500 mg by mouth daily    Historical Provider, MD Multiple Vitamins-Minerals (THERAPEUTIC MULTIVITAMIN-MINERALS) tablet Take 1 tablet by mouth daily    Historical Provider, MD   levothyroxine (SYNTHROID) 75 MCG tablet Take 1 tablet by mouth Daily 10/11/22   Maria D Mohr MD   aspirin EC 81 MG EC tablet Take 1 tablet by mouth daily 6/1/21   Maria D Mohr MD   Calcium Carb-Cholecalciferol 600-800 MG-UNIT TABS Take 1 tablet by mouth daily    Historical Provider, MD   traMADol (ULTRAM) 50 MG tablet Take 50 mg by mouth 2 times daily as needed. 9/29/14   Historical Provider, MD       Current Medications:   Current Facility-Administered Medications: sodium chloride 3 % solution, 25 mL/hr, IntraVENous, Continuous    REVIEW OF SYSTEMS:       Unable to assess due to patient mentation    PHYSICAL EXAM:       /71   Pulse 92   Temp 98.7 °F (37.1 °C) (Oral)   Resp 17   Wt 124 lb (56.2 kg)   SpO2 96%   BMI 22.68 kg/m²     CONSTITUTIONAL:  Somnolent, difficult to arouse, requires repeated stimulation GCS 13, nontoxic. No dysarthria, no aphasia. EOMI.     HEAD:  normocephalic, atraumatic    EYES:  PERRLA, EOMI.   ENT:  moist mucous membranes   NECK:  supple, symmetric, no midline tenderness to palpation    BACK:  without midline tenderness, step-offs or deformities    LUNGS:  Equal air entry bilaterally   CARDIOVASCULAR:  normal s1 / s2   ABDOMEN:  Soft, no rigidity   NEUROLOGIC:  Mental Status:  Not oriented to date and Not oriented to place,somnolent             Cranial Nerves:    cranial nerves II-XII are grossly intact    Motor Exam:    Drift:  present -bilateral lower extremities  Tone:  normal    Sensory:    Touch:    Right Upper Extremity:  normal  Left Upper Extremity:  normal  Right Lower Extremity:  normal  Left Lower Extremity:  normal      Plantar Response:  Right:  downgoing  Left:  downgoing    Coordination/Dysmetria:  Heel to Shin:  Right:  normal  Left:  normal  Finger to Nose:   Right:  normal  Left: Mild dysmetria          INITIAL NIH STROKE SCALE:    Time Performed:  8:17 PM     1a.  Level of consciousness:  2 - not alert, requires repeated stimulation to attend, or is obtunded and requires strong or painful stimulation to make movements (not stereotyped)   1b.  Level of consciousness questions:  1 - answers one question correctly  1c.  Level of consciousness questions:  1 - performs one task correctly  2.    Best Gaze:  0 - normal  3.    Visual:  0 - no visual loss  4.    Facial Palsy:  1 - minor paralysis (flattened nasolabial fold, asymmetric on smiling)  5a.  Motor left arm:  0 - no drift, limb holds 90 (or 45) degrees for full 10 seconds  5b.  Motor right arm:  0 - no drift, limb holds 90 (or 45) degrees for full 10 seconds  6a.  Motor left le - drift; leg falls by the end of the 5 second period but does not hit bed  6b.  Motor right le - drift; leg falls by the end of the 5 second period but does not hit bed  7.    Limb Ataxia:  1 - present in one limb  8.    Sensory:  0 - normal; no sensory loss  9.    Best Language:  0 - no aphasia, normal  10.  Dysarthria:  0 - normal  11.  Extinction and Inattention:  0 - no abnormality    TOTAL:  7     SKIN:  no rash      LABS AND IMAGING:     CBC with Differential:    Lab Results   Component Value Date/Time    WBC 11.1 2023 08:24 PM    RBC 3.80 2023 08:24 PM    HGB 11.3 2023 08:24 PM    HCT 31.9 2023 08:24 PM    PLT See Reflexed IPF Result 2023 08:24 PM    MCV 83.9 2023 08:24 PM    MCH 29.7 2023 08:24 PM    MCHC 35.4 2023 08:24 PM    RDW 13.0 2023 08:24 PM    LYMPHOPCT 9 2023 08:24 PM    LYMPHOPCT 56.1 2021 12:00 AM    MONOPCT 10 2023 08:24 PM    MONOPCT 11.7 2021 12:00 AM    EOSPCT 1.3 2021 12:00 AM    BASOPCT 0 2023 08:24 PM    BASOPCT 0.6 2021 12:00 AM    MONOSABS 1.10 2023 08:24 PM    MONOSABS 0.7 2021 12:00 AM    LYMPHSABS 1.01 2023 08:24 PM    LYMPHSABS 3.1 2021  12:00 AM    EOSABS <0.03 01/09/2023 08:24 PM    EOSABS 0.1 05/04/2021 12:00 AM    BASOSABS <0.03 01/09/2023 08:24 PM     BMP:    Lab Results   Component Value Date/Time     01/09/2023 08:24 PM    K 3.5 01/09/2023 08:24 PM    CL 82 01/09/2023 08:24 PM    CO2 17 01/09/2023 08:24 PM    BUN 9 01/09/2023 08:24 PM    LABALBU 3.8 01/09/2023 08:24 PM    CREATININE 0.71 01/09/2023 08:24 PM    CREATININE 0.76 01/09/2023 08:08 PM    CALCIUM 8.8 01/09/2023 08:24 PM    LABGLOM >60 01/09/2023 08:24 PM    GLUCOSE 98 01/09/2023 08:24 PM       Radiology Review:    Images not available in PACS at the time of consult, will review independently once they become available. Following based on radiology report    CTH: No acute intracranial abnormalities  CTA h&N: Left ICA aneurysm, no reported LVO or critical stenosis        ASSESSMENT AND PLAN:       Patient Active Problem List   Diagnosis    Hypothyroidism    Pure hypercholesterolemia    Specified congenital anomalies of hair    Vitamin D deficiency    Arthritis    CAD (coronary artery disease)    Hearing loss    Osteopenia    History of DVT (deep vein thrombosis)    Microhematuria    Chronic renal failure, stage 3b ([address]           [de-identified] y.o. female who presents with left-sided flaccid paralysis and altered mental status. Was found to have sodium of 116. Takes aspirin and Lipitor at home as well as tramadol long-term. CT head unremarkable, CTA shows left ICA aneurysm which does not correlate with left-sided symptoms. Differential diagnosis includes provoked seizure secondary to hyponatremia and concomitant tramadol use versus TIA.       -MRI brain without contrast  -Routine 30-minute EEG  -We will hold off antiepileptics for now  -Continue aspirin 81 mg daily and Lipitor 20 mg daily for now  - Telemetry   - Neuro checks per protocol  - We recommend SBP normotensive        Anay Clarke MD   1/9/2023  11:24 PM 0

## 2023-11-07 NOTE — PATIENT PROFILE ADULT - HAS THE PATIENT USED TOBACCO IN THE PAST 30 DAYS?
Chi Mckeon was admitted for hyperglycemia in setting of type 2 diabetes without evidence of DKA. Also noted to have an ROXANNE on admission. Glucose and creatinine improving with insulin titration. Stable for discharge with refill on insulin pens and discontinuation of valsartan (was taking lisinopril and valsartan together). Return precautions discussed, refer to endocrine at discharge.   Unable to assess due to patient's cognitive impairment

## 2024-04-05 NOTE — ED PROVIDER NOTE - CADM POA PRESS ULCER
No Detail Level: Detailed Depth Of Biopsy: dermis Was A Bandage Applied: Yes Size Of Lesion In Cm: 1 X Size Of Lesion In Cm: 0 Biopsy Type: H and E Biopsy Method: Dermablade Anesthesia Type: 1% lidocaine with epinephrine Anesthesia Volume In Cc: 0.5 Hemostasis: Electrocautery Wound Care: Bacitracin Dressing: bandage Destruction After The Procedure: No Type Of Destruction Used: Curettage Curettage Text: The wound bed was treated with curettage after the biopsy was performed. Cryotherapy Text: The wound bed was treated with cryotherapy after the biopsy was performed. Electrodesiccation Text: The wound bed was treated with electrodesiccation after the biopsy was performed. Electrodesiccation And Curettage Text: The wound bed was treated with electrodesiccation and curettage after the biopsy was performed. Silver Nitrate Text: The wound bed was treated with silver nitrate after the biopsy was performed. Lab: [address] Path Notes (To The Dermatopathologist): Size: 1.0cm\\nR/O: BCC vs SCC Consent: Written consent was obtained and risks were reviewed including but not limited to scarring, infection, bleeding, scabbing, incomplete removal, nerve damage and allergy to anesthesia. Post-Care Instructions: I reviewed with the patient in detail post-care instructions. Patient is to keep the biopsy site dry overnight, and then apply bacitracin twice daily until healed. Patient may apply hydrogen peroxide soaks to remove any crusting. Notification Instructions: Patient will be notified of biopsy results. However, patient instructed to call the office if not contacted within 2 weeks. Billing Type: Third-Party Bill Information: Selecting Yes will display possible errors in your note based on the variables you have selected. This validation is only offered as a suggestion for you. PLEASE NOTE THAT THE VALIDATION TEXT WILL BE REMOVED WHEN YOU FINALIZE YOUR NOTE. IF YOU WANT TO FAX A PRELIMINARY NOTE YOU WILL NEED TO TOGGLE THIS TO 'NO' IF YOU DO NOT WANT IT IN YOUR FAXED NOTE.